# Patient Record
Sex: FEMALE | URBAN - METROPOLITAN AREA
[De-identification: names, ages, dates, MRNs, and addresses within clinical notes are randomized per-mention and may not be internally consistent; named-entity substitution may affect disease eponyms.]

---

## 2023-11-20 ENCOUNTER — INPATIENT (INPATIENT)
Facility: HOSPITAL | Age: 53
LOS: 9 days | Discharge: PSYCHIATRIC OR SUBSTANCE USE FACILITY | DRG: 885 | End: 2023-11-30
Attending: STUDENT IN AN ORGANIZED HEALTH CARE EDUCATION/TRAINING PROGRAM | Admitting: HOSPITALIST
Payer: SELF-PAY

## 2023-11-20 VITALS
SYSTOLIC BLOOD PRESSURE: 104 MMHG | OXYGEN SATURATION: 99 % | HEIGHT: 60 IN | HEART RATE: 102 BPM | RESPIRATION RATE: 20 BRPM | DIASTOLIC BLOOD PRESSURE: 70 MMHG | WEIGHT: 100.09 LBS

## 2023-11-20 LAB
ALBUMIN SERPL ELPH-MCNC: 4.4 G/DL — SIGNIFICANT CHANGE UP (ref 3.3–5)
ALBUMIN SERPL ELPH-MCNC: 4.4 G/DL — SIGNIFICANT CHANGE UP (ref 3.3–5)
ALP SERPL-CCNC: 189 U/L — HIGH (ref 40–120)
ALP SERPL-CCNC: 189 U/L — HIGH (ref 40–120)
ALT FLD-CCNC: 19 U/L — SIGNIFICANT CHANGE UP (ref 10–45)
ALT FLD-CCNC: 19 U/L — SIGNIFICANT CHANGE UP (ref 10–45)
ANION GAP SERPL CALC-SCNC: 13 MMOL/L — SIGNIFICANT CHANGE UP (ref 5–17)
ANION GAP SERPL CALC-SCNC: 13 MMOL/L — SIGNIFICANT CHANGE UP (ref 5–17)
APAP SERPL-MCNC: <15 UG/ML — SIGNIFICANT CHANGE UP (ref 10–30)
APAP SERPL-MCNC: <15 UG/ML — SIGNIFICANT CHANGE UP (ref 10–30)
AST SERPL-CCNC: 10 U/L — SIGNIFICANT CHANGE UP (ref 10–40)
AST SERPL-CCNC: 10 U/L — SIGNIFICANT CHANGE UP (ref 10–40)
B-OH-BUTYR SERPL-SCNC: 0.2 MMOL/L — SIGNIFICANT CHANGE UP
B-OH-BUTYR SERPL-SCNC: 0.2 MMOL/L — SIGNIFICANT CHANGE UP
BASE EXCESS BLDV CALC-SCNC: -0.2 MMOL/L — SIGNIFICANT CHANGE UP (ref -2–3)
BASE EXCESS BLDV CALC-SCNC: -0.2 MMOL/L — SIGNIFICANT CHANGE UP (ref -2–3)
BASOPHILS # BLD AUTO: 0.05 K/UL — SIGNIFICANT CHANGE UP (ref 0–0.2)
BASOPHILS # BLD AUTO: 0.05 K/UL — SIGNIFICANT CHANGE UP (ref 0–0.2)
BASOPHILS NFR BLD AUTO: 0.5 % — SIGNIFICANT CHANGE UP (ref 0–2)
BASOPHILS NFR BLD AUTO: 0.5 % — SIGNIFICANT CHANGE UP (ref 0–2)
BILIRUB SERPL-MCNC: 0.2 MG/DL — SIGNIFICANT CHANGE UP (ref 0.2–1.2)
BILIRUB SERPL-MCNC: 0.2 MG/DL — SIGNIFICANT CHANGE UP (ref 0.2–1.2)
BUN SERPL-MCNC: 18 MG/DL — SIGNIFICANT CHANGE UP (ref 7–23)
BUN SERPL-MCNC: 18 MG/DL — SIGNIFICANT CHANGE UP (ref 7–23)
CA-I SERPL-SCNC: 1.22 MMOL/L — SIGNIFICANT CHANGE UP (ref 1.15–1.33)
CA-I SERPL-SCNC: 1.22 MMOL/L — SIGNIFICANT CHANGE UP (ref 1.15–1.33)
CALCIUM SERPL-MCNC: 10 MG/DL — SIGNIFICANT CHANGE UP (ref 8.4–10.5)
CALCIUM SERPL-MCNC: 10 MG/DL — SIGNIFICANT CHANGE UP (ref 8.4–10.5)
CHLORIDE BLDV-SCNC: 98 MMOL/L — SIGNIFICANT CHANGE UP (ref 96–108)
CHLORIDE BLDV-SCNC: 98 MMOL/L — SIGNIFICANT CHANGE UP (ref 96–108)
CHLORIDE SERPL-SCNC: 98 MMOL/L — SIGNIFICANT CHANGE UP (ref 96–108)
CHLORIDE SERPL-SCNC: 98 MMOL/L — SIGNIFICANT CHANGE UP (ref 96–108)
CO2 BLDV-SCNC: 27 MMOL/L — HIGH (ref 22–26)
CO2 BLDV-SCNC: 27 MMOL/L — HIGH (ref 22–26)
CO2 SERPL-SCNC: 21 MMOL/L — LOW (ref 22–31)
CO2 SERPL-SCNC: 21 MMOL/L — LOW (ref 22–31)
CREAT SERPL-MCNC: 0.42 MG/DL — LOW (ref 0.5–1.3)
CREAT SERPL-MCNC: 0.42 MG/DL — LOW (ref 0.5–1.3)
EGFR: 118 ML/MIN/1.73M2 — SIGNIFICANT CHANGE UP
EGFR: 118 ML/MIN/1.73M2 — SIGNIFICANT CHANGE UP
EOSINOPHIL # BLD AUTO: 0.03 K/UL — SIGNIFICANT CHANGE UP (ref 0–0.5)
EOSINOPHIL # BLD AUTO: 0.03 K/UL — SIGNIFICANT CHANGE UP (ref 0–0.5)
EOSINOPHIL NFR BLD AUTO: 0.3 % — SIGNIFICANT CHANGE UP (ref 0–6)
EOSINOPHIL NFR BLD AUTO: 0.3 % — SIGNIFICANT CHANGE UP (ref 0–6)
ETHANOL SERPL-MCNC: <10 MG/DL — SIGNIFICANT CHANGE UP (ref 0–10)
ETHANOL SERPL-MCNC: <10 MG/DL — SIGNIFICANT CHANGE UP (ref 0–10)
GAS PNL BLDV: 128 MMOL/L — LOW (ref 136–145)
GAS PNL BLDV: 128 MMOL/L — LOW (ref 136–145)
GAS PNL BLDV: SIGNIFICANT CHANGE UP
GAS PNL BLDV: SIGNIFICANT CHANGE UP
GLUCOSE BLDV-MCNC: 442 MG/DL — HIGH (ref 70–99)
GLUCOSE BLDV-MCNC: 442 MG/DL — HIGH (ref 70–99)
GLUCOSE SERPL-MCNC: 530 MG/DL — CRITICAL HIGH (ref 70–99)
GLUCOSE SERPL-MCNC: 530 MG/DL — CRITICAL HIGH (ref 70–99)
HCG SERPL-ACNC: <2 MIU/ML — SIGNIFICANT CHANGE UP
HCG SERPL-ACNC: <2 MIU/ML — SIGNIFICANT CHANGE UP
HCO3 BLDV-SCNC: 25 MMOL/L — SIGNIFICANT CHANGE UP (ref 22–29)
HCO3 BLDV-SCNC: 25 MMOL/L — SIGNIFICANT CHANGE UP (ref 22–29)
HCT VFR BLD CALC: 40.7 % — SIGNIFICANT CHANGE UP (ref 34.5–45)
HCT VFR BLD CALC: 40.7 % — SIGNIFICANT CHANGE UP (ref 34.5–45)
HCT VFR BLDA CALC: 39 % — SIGNIFICANT CHANGE UP (ref 34.5–46.5)
HCT VFR BLDA CALC: 39 % — SIGNIFICANT CHANGE UP (ref 34.5–46.5)
HGB BLD CALC-MCNC: 13 G/DL — SIGNIFICANT CHANGE UP (ref 11.7–16.1)
HGB BLD CALC-MCNC: 13 G/DL — SIGNIFICANT CHANGE UP (ref 11.7–16.1)
HGB BLD-MCNC: 14.2 G/DL — SIGNIFICANT CHANGE UP (ref 11.5–15.5)
HGB BLD-MCNC: 14.2 G/DL — SIGNIFICANT CHANGE UP (ref 11.5–15.5)
IMM GRANULOCYTES NFR BLD AUTO: 0.5 % — SIGNIFICANT CHANGE UP (ref 0–0.9)
IMM GRANULOCYTES NFR BLD AUTO: 0.5 % — SIGNIFICANT CHANGE UP (ref 0–0.9)
LACTATE BLDV-MCNC: 1.3 MMOL/L — SIGNIFICANT CHANGE UP (ref 0.5–2)
LACTATE BLDV-MCNC: 1.3 MMOL/L — SIGNIFICANT CHANGE UP (ref 0.5–2)
LYMPHOCYTES # BLD AUTO: 2.29 K/UL — SIGNIFICANT CHANGE UP (ref 1–3.3)
LYMPHOCYTES # BLD AUTO: 2.29 K/UL — SIGNIFICANT CHANGE UP (ref 1–3.3)
LYMPHOCYTES # BLD AUTO: 21.3 % — SIGNIFICANT CHANGE UP (ref 13–44)
LYMPHOCYTES # BLD AUTO: 21.3 % — SIGNIFICANT CHANGE UP (ref 13–44)
MCHC RBC-ENTMCNC: 30.4 PG — SIGNIFICANT CHANGE UP (ref 27–34)
MCHC RBC-ENTMCNC: 30.4 PG — SIGNIFICANT CHANGE UP (ref 27–34)
MCHC RBC-ENTMCNC: 34.9 GM/DL — SIGNIFICANT CHANGE UP (ref 32–36)
MCHC RBC-ENTMCNC: 34.9 GM/DL — SIGNIFICANT CHANGE UP (ref 32–36)
MCV RBC AUTO: 87.2 FL — SIGNIFICANT CHANGE UP (ref 80–100)
MCV RBC AUTO: 87.2 FL — SIGNIFICANT CHANGE UP (ref 80–100)
MONOCYTES # BLD AUTO: 0.5 K/UL — SIGNIFICANT CHANGE UP (ref 0–0.9)
MONOCYTES # BLD AUTO: 0.5 K/UL — SIGNIFICANT CHANGE UP (ref 0–0.9)
MONOCYTES NFR BLD AUTO: 4.6 % — SIGNIFICANT CHANGE UP (ref 2–14)
MONOCYTES NFR BLD AUTO: 4.6 % — SIGNIFICANT CHANGE UP (ref 2–14)
NEUTROPHILS # BLD AUTO: 7.85 K/UL — HIGH (ref 1.8–7.4)
NEUTROPHILS # BLD AUTO: 7.85 K/UL — HIGH (ref 1.8–7.4)
NEUTROPHILS NFR BLD AUTO: 72.8 % — SIGNIFICANT CHANGE UP (ref 43–77)
NEUTROPHILS NFR BLD AUTO: 72.8 % — SIGNIFICANT CHANGE UP (ref 43–77)
NRBC # BLD: 0 /100 WBCS — SIGNIFICANT CHANGE UP (ref 0–0)
NRBC # BLD: 0 /100 WBCS — SIGNIFICANT CHANGE UP (ref 0–0)
PCO2 BLDV: 44 MMHG — HIGH (ref 39–42)
PCO2 BLDV: 44 MMHG — HIGH (ref 39–42)
PH BLDV: 7.37 — SIGNIFICANT CHANGE UP (ref 7.32–7.43)
PH BLDV: 7.37 — SIGNIFICANT CHANGE UP (ref 7.32–7.43)
PLATELET # BLD AUTO: 329 K/UL — SIGNIFICANT CHANGE UP (ref 150–400)
PLATELET # BLD AUTO: 329 K/UL — SIGNIFICANT CHANGE UP (ref 150–400)
PO2 BLDV: 64 MMHG — HIGH (ref 25–45)
PO2 BLDV: 64 MMHG — HIGH (ref 25–45)
POTASSIUM BLDV-SCNC: 4.2 MMOL/L — SIGNIFICANT CHANGE UP (ref 3.5–5.1)
POTASSIUM BLDV-SCNC: 4.2 MMOL/L — SIGNIFICANT CHANGE UP (ref 3.5–5.1)
POTASSIUM SERPL-MCNC: 4.6 MMOL/L — SIGNIFICANT CHANGE UP (ref 3.5–5.3)
POTASSIUM SERPL-MCNC: 4.6 MMOL/L — SIGNIFICANT CHANGE UP (ref 3.5–5.3)
POTASSIUM SERPL-SCNC: 4.6 MMOL/L — SIGNIFICANT CHANGE UP (ref 3.5–5.3)
POTASSIUM SERPL-SCNC: 4.6 MMOL/L — SIGNIFICANT CHANGE UP (ref 3.5–5.3)
PROT SERPL-MCNC: 8 G/DL — SIGNIFICANT CHANGE UP (ref 6–8.3)
PROT SERPL-MCNC: 8 G/DL — SIGNIFICANT CHANGE UP (ref 6–8.3)
RBC # BLD: 4.67 M/UL — SIGNIFICANT CHANGE UP (ref 3.8–5.2)
RBC # BLD: 4.67 M/UL — SIGNIFICANT CHANGE UP (ref 3.8–5.2)
RBC # FLD: 12.1 % — SIGNIFICANT CHANGE UP (ref 10.3–14.5)
RBC # FLD: 12.1 % — SIGNIFICANT CHANGE UP (ref 10.3–14.5)
SALICYLATES SERPL-MCNC: <2 MG/DL — LOW (ref 15–30)
SALICYLATES SERPL-MCNC: <2 MG/DL — LOW (ref 15–30)
SAO2 % BLDV: 88.7 % — HIGH (ref 67–88)
SAO2 % BLDV: 88.7 % — HIGH (ref 67–88)
SARS-COV-2 RNA SPEC QL NAA+PROBE: SIGNIFICANT CHANGE UP
SARS-COV-2 RNA SPEC QL NAA+PROBE: SIGNIFICANT CHANGE UP
SODIUM SERPL-SCNC: 132 MMOL/L — LOW (ref 135–145)
SODIUM SERPL-SCNC: 132 MMOL/L — LOW (ref 135–145)
WBC # BLD: 10.77 K/UL — HIGH (ref 3.8–10.5)
WBC # BLD: 10.77 K/UL — HIGH (ref 3.8–10.5)
WBC # FLD AUTO: 10.77 K/UL — HIGH (ref 3.8–10.5)
WBC # FLD AUTO: 10.77 K/UL — HIGH (ref 3.8–10.5)

## 2023-11-20 PROCEDURE — 99285 EMERGENCY DEPT VISIT HI MDM: CPT

## 2023-11-20 RX ORDER — HALOPERIDOL DECANOATE 100 MG/ML
5 INJECTION INTRAMUSCULAR ONCE
Refills: 0 | Status: COMPLETED | OUTPATIENT
Start: 2023-11-20 | End: 2023-11-20

## 2023-11-20 RX ORDER — SODIUM CHLORIDE 9 MG/ML
1000 INJECTION, SOLUTION INTRAVENOUS ONCE
Refills: 0 | Status: COMPLETED | OUTPATIENT
Start: 2023-11-20 | End: 2023-11-20

## 2023-11-20 RX ADMIN — HALOPERIDOL DECANOATE 5 MILLIGRAM(S): 100 INJECTION INTRAMUSCULAR at 20:34

## 2023-11-20 RX ADMIN — Medication 2 MILLIGRAM(S): at 20:34

## 2023-11-20 NOTE — ED ADULT TRIAGE NOTE - CHIEF COMPLAINT QUOTE
found wandering outside  per EMS family called due to patient "not taking her medications and wandering outside"  Hx of schizophrenia

## 2023-11-20 NOTE — ED PROVIDER NOTE - ATTENDING CONTRIBUTION TO CARE
Attending MD Garcia:  I personally have seen and examined this patient. I have performed a substantive portion of the visit including all aspects of the medical decision making.  Resident note reviewed and agree on plan of care and except where noted.      53-year-old woman with reported history of schizophrenia, diabetes is presenting with EMS for evaluation of possible psychiatric complaints.  Per the patient, she states her cousin called the ambulance on her today because she was causing a "domestic issue".  She states that she thinks her family called the ambulance because she talks too much.  Reportedly she has not been taking her medications however the patient does not know what medicine she is supposed to be on.  When asked if she wants to kill herself she says yes.  She does not elaborate on why or how she would do it.  She does admit to hearing voices she states that she hears people making noises and crying and making a mess.  She lives in a shelter.  Denies any recreational drug use.  No other complaints.    Patient's vital signs are notable for elevated heart rate otherwise nonactionable.  Patient sitting in the stretcher cooperative, at the moment.  No evident trauma somewhat disheveled.  Clear lungs 5/5 strength bilateral upper and lower extremities steady gait.    Patient with reported schizophrenia presenting for reported issues with medication compliance however unclear what medications patient supposed to be taking.  Patient's, cooperative this time but does admit to SI and auditory hallucinations.  Plan for constant observation screening psychiatric labs and psych consult        *The above represents an initial assessment/impression. Please refer to progress notes for potential changes in patient clinical course* Attending MD Garcia:  I personally have seen and examined this patient. I have performed a substantive portion of the visit including all aspects of the medical decision making.  Resident note reviewed and agree on plan of care and except where noted.      53-year-old woman with reported history of schizophrenia, diabetes is presenting with EMS for evaluation of possible psychiatric complaints, Estonian translation by ID# [id number].  Per the patient, she states her cousin called the ambulance on her today because she was causing a "domestic issue".  She states that she thinks her family called the ambulance because she talks too much.  Reportedly she has not been taking her medications however the patient does not know what medicine she is supposed to be on.  When asked if she wants to kill herself she says yes.  She does not elaborate on why or how she would do it.  She does admit to hearing voices she states that she hears people making noises and crying and making a mess.  She lives in a shelter.  Denies any recreational drug use.  No other complaints.    Patient's vital signs are notable for elevated heart rate otherwise nonactionable.  Patient sitting in the stretcher cooperative, at the moment.  No evident trauma somewhat disheveled.  Clear lungs 5/5 strength bilateral upper and lower extremities steady gait.    Patient with reported schizophrenia presenting for reported issues with medication compliance however unclear what medications patient supposed to be taking.  Patient's, cooperative this time but does admit to SI and auditory hallucinations.  Plan for constant observation screening psychiatric labs and psych consult        *The above represents an initial assessment/impression. Please refer to progress notes for potential changes in patient clinical course*

## 2023-11-20 NOTE — ED ADULT NURSE NOTE - OBJECTIVE STATEMENT
53 year old female BIB EMS for psych eval; A&O; +SI, unclear plan or intent; +AVH, pt cannot state; denies ETOH and drug use; axis III: DM, states takes oral hypoglycemics. This is a somewhat disorganised pt with poor hygiene and soiled clothes, wanding done, no valuables, CO begun. EMS states that pt's sister called 911 stating pt is off her meds, has schizophrenia, no other collateral given; pt is Chinese speaking and  utilized, she states she lives in a homeless shelter "by [address]" and today "I went out with some friends for Mexican food and then went by my sister's and she called 911"; pt is not a reliable historian, when asked about hearing or seeing thing she knows are not there states "I hear people in my homeless shelter and they make noise so I can't sleep at night" when asked about SI states "I am going to be dead at my homeless shelter' she states she is compliant with her medications but cannot state when she last took them; continue to monitor.

## 2023-11-20 NOTE — ED PROVIDER NOTE - PROGRESS NOTE DETAILS
Attending MD Garcia: Patient hyperglycemic to 530, unfortunately patient's not cooperative with interventions including IV placement.  Will need to provide relaxant medications Haldol and Ativan to facilitate medical investigations/treatment. Hamida RICHARDS PGY3: Patient not in DKA.  Given 2 L IVF.  Will repeat fingerstick.  Spoke to hospitalist will admit patient for hyperglycemia. will need psych in pt

## 2023-11-20 NOTE — ED ADULT NURSE REASSESSMENT NOTE - NS ED NURSE REASSESS COMMENT FT1
Pt laying on the floor refusing to get up or lay in stretcher. Safety maintained, safe environment with 1:1 observation. MD Mei aware.

## 2023-11-21 DIAGNOSIS — R73.9 HYPERGLYCEMIA, UNSPECIFIED: ICD-10-CM

## 2023-11-21 DIAGNOSIS — E78.5 HYPERLIPIDEMIA, UNSPECIFIED: ICD-10-CM

## 2023-11-21 DIAGNOSIS — F20.9 SCHIZOPHRENIA, UNSPECIFIED: ICD-10-CM

## 2023-11-21 DIAGNOSIS — I10 ESSENTIAL (PRIMARY) HYPERTENSION: ICD-10-CM

## 2023-11-21 DIAGNOSIS — R45.851 SUICIDAL IDEATIONS: ICD-10-CM

## 2023-11-21 DIAGNOSIS — R41.82 ALTERED MENTAL STATUS, UNSPECIFIED: ICD-10-CM

## 2023-11-21 DIAGNOSIS — E11.65 TYPE 2 DIABETES MELLITUS WITH HYPERGLYCEMIA: ICD-10-CM

## 2023-11-21 LAB
A1C WITH ESTIMATED AVERAGE GLUCOSE RESULT: 13.4 % — HIGH (ref 4–5.6)
A1C WITH ESTIMATED AVERAGE GLUCOSE RESULT: 13.4 % — HIGH (ref 4–5.6)
ESTIMATED AVERAGE GLUCOSE: 338 MG/DL — HIGH (ref 68–114)
ESTIMATED AVERAGE GLUCOSE: 338 MG/DL — HIGH (ref 68–114)
GLUCOSE BLDC GLUCOMTR-MCNC: 208 MG/DL — HIGH (ref 70–99)
GLUCOSE BLDC GLUCOMTR-MCNC: 208 MG/DL — HIGH (ref 70–99)
GLUCOSE BLDC GLUCOMTR-MCNC: 258 MG/DL — HIGH (ref 70–99)
GLUCOSE BLDC GLUCOMTR-MCNC: 258 MG/DL — HIGH (ref 70–99)
GLUCOSE BLDC GLUCOMTR-MCNC: 309 MG/DL — HIGH (ref 70–99)
GLUCOSE BLDC GLUCOMTR-MCNC: 309 MG/DL — HIGH (ref 70–99)
GLUCOSE BLDC GLUCOMTR-MCNC: 315 MG/DL — HIGH (ref 70–99)
GLUCOSE BLDC GLUCOMTR-MCNC: 315 MG/DL — HIGH (ref 70–99)
GLUCOSE BLDC GLUCOMTR-MCNC: 331 MG/DL — HIGH (ref 70–99)
GLUCOSE BLDC GLUCOMTR-MCNC: 331 MG/DL — HIGH (ref 70–99)

## 2023-11-21 PROCEDURE — 99255 IP/OBS CONSLTJ NEW/EST HI 80: CPT

## 2023-11-21 PROCEDURE — 99254 IP/OBS CNSLTJ NEW/EST MOD 60: CPT

## 2023-11-21 PROCEDURE — 99222 1ST HOSP IP/OBS MODERATE 55: CPT

## 2023-11-21 RX ORDER — SODIUM CHLORIDE 9 MG/ML
1000 INJECTION, SOLUTION INTRAVENOUS
Refills: 0 | Status: DISCONTINUED | OUTPATIENT
Start: 2023-11-21 | End: 2023-11-30

## 2023-11-21 RX ORDER — ACETAMINOPHEN 500 MG
650 TABLET ORAL EVERY 6 HOURS
Refills: 0 | Status: DISCONTINUED | OUTPATIENT
Start: 2023-11-21 | End: 2023-11-30

## 2023-11-21 RX ORDER — INSULIN LISPRO 100/ML
VIAL (ML) SUBCUTANEOUS
Refills: 0 | Status: DISCONTINUED | OUTPATIENT
Start: 2023-11-21 | End: 2023-11-30

## 2023-11-21 RX ORDER — INSULIN LISPRO 100/ML
3 VIAL (ML) SUBCUTANEOUS
Refills: 0 | Status: DISCONTINUED | OUTPATIENT
Start: 2023-11-21 | End: 2023-11-22

## 2023-11-21 RX ORDER — SODIUM CHLORIDE 9 MG/ML
1000 INJECTION INTRAMUSCULAR; INTRAVENOUS; SUBCUTANEOUS ONCE
Refills: 0 | Status: COMPLETED | OUTPATIENT
Start: 2023-11-21 | End: 2023-11-21

## 2023-11-21 RX ORDER — DEXTROSE 50 % IN WATER 50 %
25 SYRINGE (ML) INTRAVENOUS ONCE
Refills: 0 | Status: DISCONTINUED | OUTPATIENT
Start: 2023-11-21 | End: 2023-11-30

## 2023-11-21 RX ORDER — ONDANSETRON 8 MG/1
4 TABLET, FILM COATED ORAL EVERY 8 HOURS
Refills: 0 | Status: DISCONTINUED | OUTPATIENT
Start: 2023-11-21 | End: 2023-11-30

## 2023-11-21 RX ORDER — INSULIN HUMAN 100 [IU]/ML
5 INJECTION, SOLUTION SUBCUTANEOUS ONCE
Refills: 0 | Status: COMPLETED | OUTPATIENT
Start: 2023-11-21 | End: 2023-11-21

## 2023-11-21 RX ORDER — DEXTROSE 50 % IN WATER 50 %
15 SYRINGE (ML) INTRAVENOUS ONCE
Refills: 0 | Status: DISCONTINUED | OUTPATIENT
Start: 2023-11-21 | End: 2023-11-30

## 2023-11-21 RX ORDER — INSULIN GLARGINE 100 [IU]/ML
12 INJECTION, SOLUTION SUBCUTANEOUS AT BEDTIME
Refills: 0 | Status: DISCONTINUED | OUTPATIENT
Start: 2023-11-21 | End: 2023-11-22

## 2023-11-21 RX ORDER — LANOLIN ALCOHOL/MO/W.PET/CERES
3 CREAM (GRAM) TOPICAL AT BEDTIME
Refills: 0 | Status: DISCONTINUED | OUTPATIENT
Start: 2023-11-21 | End: 2023-11-30

## 2023-11-21 RX ORDER — DEXTROSE 50 % IN WATER 50 %
12.5 SYRINGE (ML) INTRAVENOUS ONCE
Refills: 0 | Status: DISCONTINUED | OUTPATIENT
Start: 2023-11-21 | End: 2023-11-30

## 2023-11-21 RX ORDER — GLUCAGON INJECTION, SOLUTION 0.5 MG/.1ML
1 INJECTION, SOLUTION SUBCUTANEOUS ONCE
Refills: 0 | Status: DISCONTINUED | OUTPATIENT
Start: 2023-11-21 | End: 2023-11-30

## 2023-11-21 RX ADMIN — SODIUM CHLORIDE 1000 MILLILITER(S): 9 INJECTION INTRAMUSCULAR; INTRAVENOUS; SUBCUTANEOUS at 01:31

## 2023-11-21 RX ADMIN — Medication 3 UNIT(S): at 18:03

## 2023-11-21 RX ADMIN — SODIUM CHLORIDE 1000 MILLILITER(S): 9 INJECTION, SOLUTION INTRAVENOUS at 00:03

## 2023-11-21 RX ADMIN — INSULIN HUMAN 5 UNIT(S): 100 INJECTION, SOLUTION SUBCUTANEOUS at 04:57

## 2023-11-21 RX ADMIN — Medication 2: at 08:29

## 2023-11-21 RX ADMIN — Medication 4: at 18:03

## 2023-11-21 RX ADMIN — INSULIN GLARGINE 12 UNIT(S): 100 INJECTION, SOLUTION SUBCUTANEOUS at 22:48

## 2023-11-21 NOTE — BH CONSULTATION LIAISON ASSESSMENT NOTE - NSBHCHARTREVIEWVS_PSY_A_CORE FT
Vital Signs Last 24 Hrs  T(C): 36.3 (21 Nov 2023 05:42), Max: 36.4 (21 Nov 2023 02:28)  T(F): 97.4 (21 Nov 2023 05:42), Max: 97.5 (21 Nov 2023 02:28)  HR: 75 (21 Nov 2023 05:42) (55 - 103)  BP: 103/65 (21 Nov 2023 05:42) (101/69 - 118/64)  BP(mean): --  RR: 18 (21 Nov 2023 05:42) (18 - 20)  SpO2: 97% (21 Nov 2023 05:42) (97% - 99%)    Parameters below as of 21 Nov 2023 02:28  Patient On (Oxygen Delivery Method): room air

## 2023-11-21 NOTE — BH CONSULTATION LIAISON ASSESSMENT NOTE - HPI (INCLUDE ILLNESS QUALITY, SEVERITY, DURATION, TIMING, CONTEXT, MODIFYING FACTORS, ASSOCIATED SIGNS AND SYMPTOMS)
53F Slovenian speaking patient with reported history of schizophrenia, diabetes is presenting due to possible psychiatric complaints/AMS.      Per the ED notes:   No phone contact number available. Previous doctor not in hospital currently. Hx obtained via chart review only. Need medication reconciliation.    Previously, per the patient, she stated her cousin called the ambulance on her today because she was causing a "domestic issue".  She stated that she "thinks her family called the ambulance because she talks too much". Reportedly she has not been taking her medications, however the patient did not know what medicine she is supposed to be on.  When asked if she wants to kill herself she says yes.  She does not elaborate on why or how she would do it.  She does admit to hearing voices she states that she hears people making noises and crying and making a mess.  She lives in a shelter.  Denies any recreational drug use.  No other complaints.    Today, seen at bedside by psychiatry. The patient was refusing to speak with the team, turning over in bed and at times pretending to be asleep.  The possibility of psychiatric admission was communicated to the patient.

## 2023-11-21 NOTE — BH CONSULTATION LIAISON ASSESSMENT NOTE - RISK ASSESSMENT
Risk factors: Patient has stated SI, has unstable housing (in homeless shelter) and appears in soiled clothes and with poor hygiene, suggesting inability to care for self    No known access to lethal means, however really unable to assess.

## 2023-11-21 NOTE — BH CONSULTATION LIAISON ASSESSMENT NOTE - NSBHATTESTCOMMENTATTENDFT_PSY_A_CORE
This is a 53-y.o. HF patient, Georgian-speaking, with reported history of schizophrenia, diabetes is presenting due to possible psychiatric complaints/AMS. Patient was found wandering and is not cooperative. Admitted for hyperglycemia. Consult requested to evaluate patient for bizarre behavior.    I have seen and evaluated this patient myself. Chart, labs, meds reviewed. I agree with trainee's assessment and plan. Patient prevalently negativistic, refusing to cooperate or engage with care providers. Not a safe discharge at this time.

## 2023-11-21 NOTE — BH CONSULTATION LIAISON ASSESSMENT NOTE - ADDITIONAL DETAILS ALL
Per nurse note -- when asked about SI states "I am going to be dead at my homeless shelter' she states she is compliant with her medications but cannot state when she last took them

## 2023-11-21 NOTE — BH CONSULTATION LIAISON ASSESSMENT NOTE - CURRENT MEDICATION
MEDICATIONS  (STANDING):  dextrose 5%. 1000 milliLiter(s) (100 mL/Hr) IV Continuous <Continuous>  dextrose 5%. 1000 milliLiter(s) (50 mL/Hr) IV Continuous <Continuous>  dextrose 50% Injectable 25 Gram(s) IV Push once  dextrose 50% Injectable 12.5 Gram(s) IV Push once  dextrose 50% Injectable 25 Gram(s) IV Push once  glucagon  Injectable 1 milliGRAM(s) IntraMuscular once  insulin lispro (ADMELOG) corrective regimen sliding scale   SubCutaneous three times a day before meals    MEDICATIONS  (PRN):  acetaminophen     Tablet .. 650 milliGRAM(s) Oral every 6 hours PRN Temp greater or equal to 38C (100.4F), Mild Pain (1 - 3)  aluminum hydroxide/magnesium hydroxide/simethicone Suspension 30 milliLiter(s) Oral every 4 hours PRN Dyspepsia  dextrose Oral Gel 15 Gram(s) Oral once PRN Blood Glucose LESS THAN 70 milliGRAM(s)/deciliter  melatonin 3 milliGRAM(s) Oral at bedtime PRN Insomnia  ondansetron Injectable 4 milliGRAM(s) IV Push every 8 hours PRN Nausea and/or Vomiting

## 2023-11-21 NOTE — BH CONSULTATION LIAISON ASSESSMENT NOTE - DESCRIPTION
Patient lives in a shelter near "[address]," denies ETOH and drug use per note.

## 2023-11-21 NOTE — CONSULT NOTE ADULT - ATTENDING COMMENTS
Agree with Dr. Juan's assessment and plan as outlined above. Reviewed all pertinent labs, and imaging studies. Modifications made as indicated above. 53 F with history of schizoprehnia and T2D here for AMS. Endocrinology consulted for diabetes management. A1C 13.4. Patient is a poor historian, unclear what she takes at home. Would start weight based basal/bolus while admitted and titrate to glucose goal of 100-180 while inpatient. Discharge regimen will be based on her dispo plan and whether she can safely give herself insulin injection. Rest of the plan as above. Patient is high risk with high level decision making due to uncontrolled diabetes which places patient at high risk for cardiovascular and cerebrovascular events. Patient with lability of glucose requiring close monitoring and insulin adjustments.

## 2023-11-21 NOTE — H&P ADULT - PROBLEM SELECTOR PLAN 1
- Glucose of 530 on admission, improved to 315 with fluids given in ED  - Likely due to medicaiton non-compliance, unsure of home meds  - Regular insulin 5u given  - Low ISS  - F/u POCT, A1c

## 2023-11-21 NOTE — H&P ADULT - NSHPPHYSICALEXAM_GEN_ALL_CORE
T(C): 36.4 (11-21-23 @ 02:28), Max: 36.4 (11-21-23 @ 02:28)  HR: 75 (11-21-23 @ 02:28) (55 - 103)  BP: 116/75 (11-21-23 @ 02:28) (101/69 - 118/64)  RR: 18 (11-21-23 @ 02:28) (18 - 20)  SpO2: 99% (11-21-23 @ 02:28) (98% - 99%)    CONSTITUTIONAL: No apparent distress. Lethargic. Malodorous.  EYES: PERRLA and symmetric  ENMT: Oral mucosa with moist membranes. Poor dentition  NECK: Supple, symmetric. No JVD.  RESP: No respiratory distress, no use of accessory muscles; CTA b/l, no WRR  CV: RRR, +S1S2, no MRG  GI: Soft, NT, ND, no rebound, no guarding  : No suprapubic tenderness. No CVA tenderness.  LYMPH: No cervical LAD or tenderness  MSK: No spinal tenderness  EXTREMITIES: No pedal edema  SKIN: No rashes or ulcers noted  NEURO: Responsive only to painful stimuli. No tremor.

## 2023-11-21 NOTE — PATIENT PROFILE ADULT - FUNCTIONAL ASSESSMENT - DAILY ACTIVITY 4.
-- DO NOT REPLY / DO NOT REPLY ALL --  -- Message is from the [facility]--    COVID-19 Universal Screening: N/A - Not about scheduling    General Patient Message      Reason for Call: Please be advised, My medication is being sent to the [facility] on [address]. My medication should be sent to the [facility] on [address] which is closer to where I live. The Pharmacy is aware & they will reverse all medications back to the original location.     Caller Information       Type Contact Phone    07/19/2020 04:57 PM Phone (Incoming) Jannie Govea (Self) [phone] (H)          Alternative phone number: None     Turnaround time given to caller:   \"This message will be sent to [state Provider's name]. The clinical team will fulfill your request as soon as they review your message when the office opens on Monday (or after the holiday).\"     3 = A little assistance

## 2023-11-21 NOTE — BH CONSULTATION LIAISON ASSESSMENT NOTE - SUMMARY
53F Hebrew speaking patient with reported history of schizophrenia, diabetes is presenting due to possible psychiatric complaints/AMS.      Per the ED notes:   No phone contact number available. Previous doctor not in hospital currently. Hx obtained via chart review only. Need medication reconciliation.    Previously, per the patient, she stated her cousin called the ambulance on her today because she was causing a "domestic issue".  She stated that she "thinks her family called the ambulance because she talks too much". Reportedly she has not been taking her medications, however the patient did not know what medicine she is supposed to be on.  When asked if she wants to kill herself she says yes.  She does not elaborate on why or how she would do it.  She does admit to hearing voices she states that she hears people making noises and crying and making a mess.  She lives in a shelter.  Denies any recreational drug use.  No other complaints.    Today, seen at bedside by psychiatry. The patient was refusing to speak with the team, turning over in bed and at times pretending to be asleep.  The possibility of psychiatric admission was communicated to the patient.    Plan:  - One to one observation for possible SI  - B12/Folate, TSH w/ FT4, Head CT  -EKG for Qtc monitoring should neuroleptic agents need to be used   -PRN: Haldol 0.5 mg PO/IM/IV q 6Hrs PRN severe agitation if Qtc < 500   -Pt. may warrant admission to psychiatry once medically cleared, will continue to evaluate and reassess  53F Turkmen speaking patient with reported history of schizophrenia, diabetes is presenting due to possible psychiatric complaints/AMS.      Per the ED notes:   No phone contact number available. Previous doctor not in hospital currently. Hx obtained via chart review only. Need medication reconciliation.    Previously, per the patient, she stated her cousin called the ambulance on her today because she was causing a "domestic issue".  She stated that she "thinks her family called the ambulance because she talks too much". Reportedly she has not been taking her medications, however the patient did not know what medicine she is supposed to be on.  When asked if she wants to kill herself she says yes.  She does not elaborate on why or how she would do it.  She does admit to hearing voices she states that she hears people making noises and crying and making a mess.  She lives in a shelter.  Denies any recreational drug use.  No other complaints.    Today, seen at bedside by psychiatry. The patient was refusing to speak with the team, turning over in bed and at times pretending to be asleep. She is stating that she does not want to talk.    Plan:  - One to one observation for possible SI  - B12/Folate, TSH w/ FT4, Head CT  -EKG for Qtc monitoring should neuroleptic agents need to be used   -PRN: Haldol 5 mg PO/IM/IV q 6Hrs PRN severe agitation if Qtc < 500   -Pt. may warrant admission to psychiatry once medically cleared, will continue to evaluate and reassess

## 2023-11-21 NOTE — H&P ADULT - HISTORY OF PRESENT ILLNESS
Likely 2/2 to receiving ativan and haldol, patient is completely unresponsive to stimuli other than pain. No phone contact number available. Previous doctor not in hospital currently. Hx obtained via chart review only. Need medication reconciliation.    53F Frisian speaking patient with reported history of schizophrenia, diabetes is presenting due to possible psychiatric complaints/AMS.      Previously, per the patient, she stated her cousin called the ambulance on her today because she was causing a "domestic issue".  She stated that she "thinks her family called the ambulance because she talks too much". Reportedly she has not been taking her medications, however the patient did not know what medicine she is supposed to be on.  When asked if she wants to kill herself she says yes.  She does not elaborate on why or how she would do it.  She does admit to hearing voices she states that she hears people making noises and crying and making a mess.  She lives in a shelter.  Denies any recreational drug use.  No other complaints.

## 2023-11-21 NOTE — H&P ADULT - PROBLEM SELECTOR PLAN 3
- AMS likely 2/2 to medication non-compliance, then administration of Ativan and Haldol  - Re-assess mental status in AM  - Clear diet until can evaluate patient

## 2023-11-21 NOTE — CONSULT NOTE ADULT - ASSESSMENT
53F Nauruan speaking patient with reported history of schizophrenia, diabetes is presenting due to possible psychiatric complaints/AMS.  Endocrinology consulted for management of diabetes.    Poorly controlled T2DM with hyperglycemia  - HbA1c: 13.4  - Home Regimen:   - Endocrinologist:  PLAN  - Hold oral DM agents while inpatient  - Start Lantus  units at bedtime. DO NOT HOLD IF NPO.  - Start Admelog  units TID pre-meal. HOLD IF NPO.  - Use moderate/Use low dose Admelog correction scale pre-meal  - Use moderate/Use low dose Admelog correction scale at bedtime  - Fingerstick BG before meals and bedtime  - Goal -180  - Carbohydrate consistent diet  - RD consult  Discharge plan:  - Discharge medications: ************************  - Patient to call doctor with persistent high or low BG at home.   - Ensure patient has glucometer, test strips and lancets on discharge.  - Recommend routine outpatient ophthalmology, podiatry and endocrinology f/u    HTN  - Home regimen: not on antihypertensive per chart review  PLAN  - Can check urine microalbumin outpatient  - Outpatient goal BP <130/80. Management per primary team.    HLD  - Home regimen: not on statin per chart review  PLAN  - Would likely benefit from a statin if no contraindication  - Can check lipid profile if not done recently    Discussed with primary team.    Michael Juan MD, Endocrinology Fellow  Pager [phone] from 9am to 5pm. After hours and on weekends, please call [phone].   53F Tunisian speaking patient with reported history of schizophrenia, diabetes is presenting due to possible psychiatric complaints/AMS.  Endocrinology consulted for management of diabetes.    Poorly controlled T2DM with hyperglycemia  - HbA1c: 13.4  - Home Regimen: not on medication  - Endocrinologist: does not have  PLAN  - Start Lantus 12 units at bedtime. DO NOT HOLD IF NPO.  - Start Admelog 3 units TID pre-meal. HOLD IF NPO.  - Use low dose Admelog correction scale pre-meal  - Use low dose Admelog correction scale at bedtime  - Fingerstick BG before meals and bedtime  - Goal -180  - Carbohydrate consistent diet  - RD consult  - hypoglycemia protocol prn  Discharge plan:  - Discharge medications: pending discharge plan  - Patient to call doctor with persistent high or low BG at home.   - Ensure patient has glucometer, test strips and lancets on discharge.  - Recommend routine outpatient ophthalmology, podiatry and endocrinology f/u    HTN  - Home regimen: not on antihypertensive per chart review  PLAN  - Can check urine microalbumin outpatient  - Outpatient goal BP <130/80. Management per primary team.    HLD  - Home regimen: not on statin per chart review  PLAN  - Would likely benefit from a statin if no contraindication  - Can check lipid profile if not done recently    Discussed with primary team.    Michael Juan MD, Endocrinology Fellow  Pager [phone] from 9am to 5pm. After hours and on weekends, please call [phone].

## 2023-11-21 NOTE — H&P ADULT - ASSESSMENT
Likely 2/2 to receiving ativan and haldol, patient is completely unresponsive to stimuli other than pain. No phone contact number available. Previous doctor not in hospital currently. Hx obtained via chart review only. Need medication reconciliation.    53F Frisian speaking patient with reported history of schizophrenia, diabetes is presenting due to possible psychiatric complaints/AMS.

## 2023-11-21 NOTE — BH CONSULTATION LIAISON ASSESSMENT NOTE - NSBHCHARTREVIEWLAB_PSY_A_CORE FT
POCT Blood Glucose (11.21.23 @ 08:18)   POCT Blood Glucose.: 208 mg/dL    Comprehensive Metabolic Panel (11.20.23 @ 17:48)   Sodium: 132 mmol/L  Potassium: 4.6 mmol/L  Chloride: 98 mmol/L  Carbon Dioxide: 21 mmol/L  Anion Gap: 13 mmol/L  Blood Urea Nitrogen: 18 mg/dL  Creatinine: 0.42 mg/dL  Glucose: 530 mg/dL  Calcium: 10.0 mg/dL  Protein Total: 8.0 g/dL  Albumin: 4.4 g/dL  Bilirubin Total: 0.2 mg/dL  Alkaline Phosphatase: 189 U/L  Aspartate Aminotransferase (AST/SGOT): 10 U/L  Alanine Aminotransferase (ALT/SGPT): 19 U/L  eGFR: 118    Complete Blood Count + Automated Diff (11.20.23 @ 17:48)   WBC Count: 10.77 K/uL  RBC Count: 4.67 M/uL  Hemoglobin: 14.2 g/dL  Hematocrit: 40.7 %  Mean Cell Volume: 87.2 fl  Mean Cell Hemoglobin: 30.4 pg  Mean Cell Hemoglobin Conc: 34.9 gm/dL  Red Cell Distrib Width: 12.1 %  Platelet Count - Automated: 329 K/uL

## 2023-11-21 NOTE — CHART NOTE - NSCHARTNOTEFT_GEN_A_CORE
Seen and examined at bedside. Attempted to use  Lisbeth ID [id number]. Patient initially had eyes open but then turned away, closed her eyes and did not want to speak to me. VSS remain stable. refusing labs and fingersticks.     53F Turkish speaking patient with reported history of schizophrenia, diabetes is presenting due to possible psychiatric complaints/AMS.    # Acute hyperglycemia.   - Glucose of 530 on admission, improved to 315 with fluids given in ED  - Likely due to medicaiton non-compliance, unsure of home meds  - Regular insulin 5u given last night, holding further insulin for now   - Low ISS  - F/u POCT but patient is refusing fingersticks     # Suicidal ideation, Schizophrenia    - Per chart review, patient expressed suicidal ideation  - 1:1 observation  - Psych consulted, f/u recs   - per chart review - nonadherence to medication     # AMS (altered mental status).   - AMS likely 2/2 to medication non-compliance, then administration of Ativan and Haldol  - unclear mental status, does not cooperate with exam, refuses to speak  - Clear diet until can evaluate patient.  - f/u CT head     d/w acp    Vicky Ferrer MD  [facility] Division of Hospital Medicine  Available via MS Teams

## 2023-11-21 NOTE — CONSULT NOTE ADULT - SUBJECTIVE AND OBJECTIVE BOX
ENDOCRINE INITIAL CONSULT - diabetes    HPI:  Likely 2/2 to receiving ativan and haldol, patient is completely unresponsive to stimuli other than pain. No phone contact number available. Previous doctor not in hospital currently. Hx obtained via chart review only. Need medication reconciliation.    53F French speaking patient with reported history of schizophrenia, diabetes is presenting due to possible psychiatric complaints/AMS.      Previously, per the patient, she stated her cousin called the ambulance on her today because she was causing a "domestic issue".  She stated that she "thinks her family called the ambulance because she talks too much". Reportedly she has not been taking her medications, however the patient did not know what medicine she is supposed to be on.  When asked if she wants to kill herself she says yes.  She does not elaborate on why or how she would do it.  She does admit to hearing voices she states that she hears people making noises and crying and making a mess.  She lives in a shelter.  Denies any recreational drug use.  No other complaints.   (21 Nov 2023 04:56)      ENDOCRINE HISTORY   Diagnosed with DM:   Last HbA1c: 13.4  Endocrinologist:   Home DM Meds:  Adherence:  Microvascular complications: Renal, opthalmologic, neuropathy  Macrovascular complications:  SMBG:  Symptoms:  Hypoglycemia episodes:  BG at home:  Diet at home:  Appetite in hospital:  Exercise:  PMHx:  PSHx:  Family hx:  Social hx:  Insurance:  Resides in:      PAST MEDICAL & SURGICAL HISTORY:  Schizophrenia          FAMILY HISTORY:      Social History:  Lives in a shelter (21 Nov 2023 04:56)      Home Medications:      MEDICATIONS  (STANDING):  dextrose 5%. 1000 milliLiter(s) (100 mL/Hr) IV Continuous <Continuous>  dextrose 5%. 1000 milliLiter(s) (50 mL/Hr) IV Continuous <Continuous>  dextrose 50% Injectable 25 Gram(s) IV Push once  dextrose 50% Injectable 25 Gram(s) IV Push once  dextrose 50% Injectable 12.5 Gram(s) IV Push once  glucagon  Injectable 1 milliGRAM(s) IntraMuscular once  insulin lispro (ADMELOG) corrective regimen sliding scale   SubCutaneous three times a day before meals    MEDICATIONS  (PRN):  acetaminophen     Tablet .. 650 milliGRAM(s) Oral every 6 hours PRN Temp greater or equal to 38C (100.4F), Mild Pain (1 - 3)  aluminum hydroxide/magnesium hydroxide/simethicone Suspension 30 milliLiter(s) Oral every 4 hours PRN Dyspepsia  dextrose Oral Gel 15 Gram(s) Oral once PRN Blood Glucose LESS THAN 70 milliGRAM(s)/deciliter  melatonin 3 milliGRAM(s) Oral at bedtime PRN Insomnia  ondansetron Injectable 4 milliGRAM(s) IV Push every 8 hours PRN Nausea and/or Vomiting      Allergies    No Known Allergies    Intolerances        REVIEW OF SYSTEMS  Constitutional: No fever  Eyes: No blurry vision  Neuro: No tremors  HEENT: No pain  Cardiovascular: No chest pain, palpitations  Respiratory: No SOB, no cough  GI: No nausea, vomiting, abdominal pain  : No dysuria  Skin: no rash  Psych: no depression  Endocrine: no polyuria, polydipsia  Hem/lymph: no swelling  Osteoporosis: no fractures  ALL OTHER SYSTEMS REVIEWED AND NEGATIVE     UNABLE TO OBTAIN     PHYSICAL EXAM   Vital Signs Last 24 Hrs  T(C): 36.3 (21 Nov 2023 05:42), Max: 36.4 (21 Nov 2023 02:28)  T(F): 97.4 (21 Nov 2023 05:42), Max: 97.5 (21 Nov 2023 02:28)  HR: 75 (21 Nov 2023 05:42) (55 - 103)  BP: 103/65 (21 Nov 2023 05:42) (101/69 - 118/64)  BP(mean): --  RR: 18 (21 Nov 2023 05:42) (18 - 20)  SpO2: 97% (21 Nov 2023 05:42) (97% - 99%)    Parameters below as of 21 Nov 2023 02:28  Patient On (Oxygen Delivery Method): room air      GENERAL: NAD, well-groomed, well-developed  EYES: No proptosis, no lid lag, anicteric  HEENT:  Atraumatic, Normocephalic, moist mucous membranes  THYROID: Normal size, no palpable nodules  RESPIRATORY: Clear to auscultation bilaterally; No rales, rhonchi, wheezing  CARDIOVASCULAR: Regular rate and rhythm; No murmurs; no peripheral edema  GI: Soft, nontender, non distended, normal bowel sounds  SKIN: Dry, intact, No rashes or lesions  MUSCULOSKELETAL: Full range of motion, normal strength  NEURO: sensation intact, extraocular movements intact, no tremor  PSYCH: Alert and oriented x 3, normal affect, normal mood  CUSHING'S SIGNS: no striae    CAPILLARY BLOOD GLUCOSE      POCT Blood Glucose.: 208 mg/dL (21 Nov 2023 08:18)  POCT Blood Glucose.: 315 mg/dL (21 Nov 2023 04:04)      A1C with Estimated Average Glucose Result: 13.4 % (11-20-23 @ 22:35)                            14.2   10.77 )-----------( 329      ( 20 Nov 2023 17:48 )             40.7       11-20    132<L>  |  98  |  18  ----------------------------<  530<HH>  4.6   |  21<L>  |  0.42<L>    Ca    10.0      20 Nov 2023 17:48    TPro  8.0  /  Alb  4.4  /  TBili  0.2  /  DBili  x   /  AST  10  /  ALT  19  /  AlkPhos  189<H>  11-20          LIPIDS    RADIOLOGY ENDOCRINE INITIAL CONSULT - diabetes    HPI:  Likely 2/2 to receiving ativan and haldol, patient is completely unresponsive to stimuli other than pain. No phone contact number available. Previous doctor not in hospital currently. Hx obtained via chart review only. Need medication reconciliation.    53F Urdu speaking patient with reported history of schizophrenia, diabetes is presenting due to possible psychiatric complaints/AMS.      Previously, per the patient, she stated her cousin called the ambulance on her today because she was causing a "domestic issue".  She stated that she "thinks her family called the ambulance because she talks too much". Reportedly she has not been taking her medications, however the patient did not know what medicine she is supposed to be on.  When asked if she wants to kill herself she says yes.  She does not elaborate on why or how she would do it.  She does admit to hearing voices she states that she hears people making noises and crying and making a mess.  She lives in a shelter.  Denies any recreational drug use.  No other complaints.   (21 Nov 2023 04:56)      ENDOCRINE HISTORY   [place]  [facility] ID [id number] used.  Diagnosed with DM: type 2 diabetes, 2 years ago  Last HbA1c: 13.4  Endocrinologist: does not have  Home DM Meds: reports taking espirinone  Microvascular complications: denies  Macrovascular complications: denies MI or CVA  SMBG: does not check  Symptoms: endorses polyuria, polydipsia, neuropathy  Diet at home: lots of water, eats a little bit  Appetite in hospital: good  Exercise: aerobics  PMHx: as above  PSHx: as above  Family hx: denies family history of diabetes  Social hx: former tobacco user, denies alcohol use or drug use      PAST MEDICAL & SURGICAL HISTORY:  Schizophrenia          FAMILY HISTORY:      Social History:  Lives in a shelter (21 Nov 2023 04:56)      Home Medications:      MEDICATIONS  (STANDING):  dextrose 5%. 1000 milliLiter(s) (100 mL/Hr) IV Continuous <Continuous>  dextrose 5%. 1000 milliLiter(s) (50 mL/Hr) IV Continuous <Continuous>  dextrose 50% Injectable 25 Gram(s) IV Push once  dextrose 50% Injectable 25 Gram(s) IV Push once  dextrose 50% Injectable 12.5 Gram(s) IV Push once  glucagon  Injectable 1 milliGRAM(s) IntraMuscular once  insulin lispro (ADMELOG) corrective regimen sliding scale   SubCutaneous three times a day before meals    MEDICATIONS  (PRN):  acetaminophen     Tablet .. 650 milliGRAM(s) Oral every 6 hours PRN Temp greater or equal to 38C (100.4F), Mild Pain (1 - 3)  aluminum hydroxide/magnesium hydroxide/simethicone Suspension 30 milliLiter(s) Oral every 4 hours PRN Dyspepsia  dextrose Oral Gel 15 Gram(s) Oral once PRN Blood Glucose LESS THAN 70 milliGRAM(s)/deciliter  melatonin 3 milliGRAM(s) Oral at bedtime PRN Insomnia  ondansetron Injectable 4 milliGRAM(s) IV Push every 8 hours PRN Nausea and/or Vomiting      Allergies    No Known Allergies    Intolerances        REVIEW OF SYSTEMS  Constitutional: No fever  Eyes: No blurry vision  Neuro: No tremors  HEENT: No pain  Cardiovascular: endorses chest pain, palpitations  Respiratory: endorses SOB, no cough  GI: endorses nausea, vomiting, abdominal pain  : No dysuria  Skin: no rash  Psych: no depression  Endocrine: no polyuria, polydipsia  Hem/lymph: no swelling  Osteoporosis: no fractures  ALL OTHER SYSTEMS REVIEWED AND NEGATIVE       PHYSICAL EXAM   Vital Signs Last 24 Hrs  T(C): 36.3 (21 Nov 2023 05:42), Max: 36.4 (21 Nov 2023 02:28)  T(F): 97.4 (21 Nov 2023 05:42), Max: 97.5 (21 Nov 2023 02:28)  HR: 75 (21 Nov 2023 05:42) (55 - 103)  BP: 103/65 (21 Nov 2023 05:42) (101/69 - 118/64)  BP(mean): --  RR: 18 (21 Nov 2023 05:42) (18 - 20)  SpO2: 97% (21 Nov 2023 05:42) (97% - 99%)    Parameters below as of 21 Nov 2023 02:28  Patient On (Oxygen Delivery Method): room air    Patient declined physical exam  GENERAL: NAD, sitting up in bed  EYES: No proptosis, anicteric  HEENT:  Atraumatic, Normocephalic, dry mucous membranes  THYROID: did not appear enlarged  RESPIRATORY: non paradoxical breathing, on room air  CARDIOVASCULAR: did not appear cynaotic no peripheral edema visualized  GI: did not appear distended  SKIN: Dry, intact,   MUSCULOSKELETAL: Full range of motion, normal strength  NEURO: no tremor  PSYCH: flat affect, normal mood  CUSHING'S SIGNS: no acanthosis, no dorsocervical fat pad    CAPILLARY BLOOD GLUCOSE      POCT Blood Glucose.: 208 mg/dL (21 Nov 2023 08:18)  POCT Blood Glucose.: 315 mg/dL (21 Nov 2023 04:04)      A1C with Estimated Average Glucose Result: 13.4 % (11-20-23 @ 22:35)                            14.2   10.77 )-----------( 329      ( 20 Nov 2023 17:48 )             40.7       11-20    132<L>  |  98  |  18  ----------------------------<  530<HH>  4.6   |  21<L>  |  0.42<L>    Ca    10.0      20 Nov 2023 17:48    TPro  8.0  /  Alb  4.4  /  TBili  0.2  /  DBili  x   /  AST  10  /  ALT  19  /  AlkPhos  189<H>  11-20          LIPIDS    RADIOLOGY

## 2023-11-22 LAB
ANION GAP SERPL CALC-SCNC: 11 MMOL/L — SIGNIFICANT CHANGE UP (ref 5–17)
ANION GAP SERPL CALC-SCNC: 11 MMOL/L — SIGNIFICANT CHANGE UP (ref 5–17)
BUN SERPL-MCNC: 12 MG/DL — SIGNIFICANT CHANGE UP (ref 7–23)
BUN SERPL-MCNC: 12 MG/DL — SIGNIFICANT CHANGE UP (ref 7–23)
CALCIUM SERPL-MCNC: 9.4 MG/DL — SIGNIFICANT CHANGE UP (ref 8.4–10.5)
CALCIUM SERPL-MCNC: 9.4 MG/DL — SIGNIFICANT CHANGE UP (ref 8.4–10.5)
CHLORIDE SERPL-SCNC: 101 MMOL/L — SIGNIFICANT CHANGE UP (ref 96–108)
CHLORIDE SERPL-SCNC: 101 MMOL/L — SIGNIFICANT CHANGE UP (ref 96–108)
CO2 SERPL-SCNC: 23 MMOL/L — SIGNIFICANT CHANGE UP (ref 22–31)
CO2 SERPL-SCNC: 23 MMOL/L — SIGNIFICANT CHANGE UP (ref 22–31)
CREAT SERPL-MCNC: 0.41 MG/DL — LOW (ref 0.5–1.3)
CREAT SERPL-MCNC: 0.41 MG/DL — LOW (ref 0.5–1.3)
EGFR: 119 ML/MIN/1.73M2 — SIGNIFICANT CHANGE UP
EGFR: 119 ML/MIN/1.73M2 — SIGNIFICANT CHANGE UP
FOLATE SERPL-MCNC: 8.2 NG/ML — SIGNIFICANT CHANGE UP
FOLATE SERPL-MCNC: 8.2 NG/ML — SIGNIFICANT CHANGE UP
GLUCOSE BLDC GLUCOMTR-MCNC: 159 MG/DL — HIGH (ref 70–99)
GLUCOSE BLDC GLUCOMTR-MCNC: 159 MG/DL — HIGH (ref 70–99)
GLUCOSE BLDC GLUCOMTR-MCNC: 256 MG/DL — HIGH (ref 70–99)
GLUCOSE BLDC GLUCOMTR-MCNC: 256 MG/DL — HIGH (ref 70–99)
GLUCOSE BLDC GLUCOMTR-MCNC: 308 MG/DL — HIGH (ref 70–99)
GLUCOSE BLDC GLUCOMTR-MCNC: 308 MG/DL — HIGH (ref 70–99)
GLUCOSE BLDC GLUCOMTR-MCNC: 464 MG/DL — CRITICAL HIGH (ref 70–99)
GLUCOSE BLDC GLUCOMTR-MCNC: 464 MG/DL — CRITICAL HIGH (ref 70–99)
GLUCOSE BLDC GLUCOMTR-MCNC: 508 MG/DL — CRITICAL HIGH (ref 70–99)
GLUCOSE BLDC GLUCOMTR-MCNC: 508 MG/DL — CRITICAL HIGH (ref 70–99)
GLUCOSE SERPL-MCNC: 300 MG/DL — HIGH (ref 70–99)
GLUCOSE SERPL-MCNC: 300 MG/DL — HIGH (ref 70–99)
POTASSIUM SERPL-MCNC: 3.9 MMOL/L — SIGNIFICANT CHANGE UP (ref 3.5–5.3)
POTASSIUM SERPL-MCNC: 3.9 MMOL/L — SIGNIFICANT CHANGE UP (ref 3.5–5.3)
POTASSIUM SERPL-SCNC: 3.9 MMOL/L — SIGNIFICANT CHANGE UP (ref 3.5–5.3)
POTASSIUM SERPL-SCNC: 3.9 MMOL/L — SIGNIFICANT CHANGE UP (ref 3.5–5.3)
SODIUM SERPL-SCNC: 135 MMOL/L — SIGNIFICANT CHANGE UP (ref 135–145)
SODIUM SERPL-SCNC: 135 MMOL/L — SIGNIFICANT CHANGE UP (ref 135–145)
T4 FREE SERPL-MCNC: 1.2 NG/DL — SIGNIFICANT CHANGE UP (ref 0.9–1.8)
T4 FREE SERPL-MCNC: 1.2 NG/DL — SIGNIFICANT CHANGE UP (ref 0.9–1.8)
TSH SERPL-MCNC: 2.44 UIU/ML — SIGNIFICANT CHANGE UP (ref 0.27–4.2)
TSH SERPL-MCNC: 2.44 UIU/ML — SIGNIFICANT CHANGE UP (ref 0.27–4.2)
VIT B12 SERPL-MCNC: 856 PG/ML — SIGNIFICANT CHANGE UP (ref 232–1245)
VIT B12 SERPL-MCNC: 856 PG/ML — SIGNIFICANT CHANGE UP (ref 232–1245)

## 2023-11-22 PROCEDURE — 99231 SBSQ HOSP IP/OBS SF/LOW 25: CPT

## 2023-11-22 PROCEDURE — 99233 SBSQ HOSP IP/OBS HIGH 50: CPT

## 2023-11-22 RX ORDER — INSULIN GLARGINE 100 [IU]/ML
15 INJECTION, SOLUTION SUBCUTANEOUS AT BEDTIME
Refills: 0 | Status: DISCONTINUED | OUTPATIENT
Start: 2023-11-22 | End: 2023-11-23

## 2023-11-22 RX ORDER — INSULIN LISPRO 100/ML
VIAL (ML) SUBCUTANEOUS AT BEDTIME
Refills: 0 | Status: DISCONTINUED | OUTPATIENT
Start: 2023-11-22 | End: 2023-11-30

## 2023-11-22 RX ORDER — INSULIN LISPRO 100/ML
6 VIAL (ML) SUBCUTANEOUS
Refills: 0 | Status: DISCONTINUED | OUTPATIENT
Start: 2023-11-22 | End: 2023-11-23

## 2023-11-22 RX ADMIN — Medication 3 UNIT(S): at 12:23

## 2023-11-22 RX ADMIN — Medication 6: at 17:56

## 2023-11-22 RX ADMIN — Medication 4: at 09:30

## 2023-11-22 RX ADMIN — INSULIN GLARGINE 15 UNIT(S): 100 INJECTION, SOLUTION SUBCUTANEOUS at 22:14

## 2023-11-22 RX ADMIN — Medication 3 UNIT(S): at 09:35

## 2023-11-22 RX ADMIN — Medication 3: at 12:22

## 2023-11-22 RX ADMIN — Medication 6 UNIT(S): at 17:56

## 2023-11-22 NOTE — DIETITIAN INITIAL EVALUATION ADULT - ENERGY INTAKE
Adequate (%) - Per PCA, pt with good appetite/PO intake in-house, consuming ~100% of meal this AM (11/22).

## 2023-11-22 NOTE — BH CONSULTATION LIAISON PROGRESS NOTE - NSBHASSESSMENTFT_PSY_ALL_CORE
53F Azeri speaking patient with reported history of schizophrenia, diabetes presented due to possible psychiatric complaints/AMS. Today, patient was eating lunch and did not want to speak. Told her that we would come back later today. Social work was also present and despite use of a  the patient did not want to engage. The PCA states that she has been intermittently getting up to walk around.  She also did so at multiple points while in her room today, always without warning. She has not tried to harm herself or others. She slept last night, with intermittent awakening and wandering.    Plan THE FOLLOWING PLAN IS MADE BY THE MEDICAL STUDENT PLEASE LOOK AT FINAL RECOMMENDATION  -One to one observation for possible SI  -Head CT  -EKG for Qtc monitoring should neuroleptic agents need to be used   -PRN: Haldol 5 mg PO/IM/IV q 6Hrs PRN severe agitation if Qtc < 500   -Pt. may warrant admission to psychiatry once medically cleared 53F Georgian speaking patient with reported history of schizophrenia, diabetes presented due to possible psychiatric complaints/AMS. Today, patient was eating lunch and did not want to speak. Told her that we would come back later today. Social work was also present and despite use of a  the patient did not want to engage. The PCA states that she has been intermittently getting up to walk around.  She also did so at multiple points while in her room today, always without warning. She has not tried to harm herself or others. She slept last night, with intermittent awakening and wandering.

## 2023-11-22 NOTE — DIETITIAN INITIAL EVALUATION ADULT - REASON INDICATOR FOR ASSESSMENT
RD Consult Indicated for: MST Score 2 or >  Source: Team, Electronic Medical Record; Unable to interview pt at this time secondary to pt restless, AMS.   Chart reviewed, events noted.

## 2023-11-22 NOTE — DIETITIAN INITIAL EVALUATION ADULT - NAME AND PHONE
Progress Note - Neurology   Lupis Oro  68 y o  male MRN: [id number]  Unit/Bed#: [facility] 503-01 Encounter: [id number]    Assessment/Plan:  1  Encephalopathy  -Etiology unclear  Likely secondary to suspected COPD exacerbation  O2 sats improved over the past day  Patient alert and oriented to person, place, situation, and month  Appears essentially back to baseline    -No major electrolyte abnormalities  Continue to monitor  -TSH, B12, folate WNL  -Patient afebrile  No leukocytosis  Influenza A and B and RSV negative  MRSA culture negative  UA negative   -No seizure activity noted  No need for EEG at this time   -No need for further neuro imaging   -No further recommendations at this time    2  COPD  -O2 sats improved  -CXR negative for active pulmonary disease  -Pulmonary consult  -Per primary team    3  H/o stroke  -Residual left-sided hemiparesis, appears at baseline  -Continue dual antiplatelet therapy and statin    4  Ambulatory dysfunction  -PT/OT    Subjective:   Lupis Oro  is a 68 y o  male with past medical history of stroke ~10 years ago with residual left-sided weakness, ambulatory dysfunction, history of lung cancer s/p lung resection, COPD, CKD, hypertension, hyperlipidemia who presents with recurrent falls from assisted living facility with generalized weakness and degree of altered mental status  CT head negative for acute intracranial abnormality  Today on exam, patient is sleeping comfortably in bed patient is sleeping comfortably in bed, easily arousable to verbal stimuli  States he feels good and slept well  No current complaints  Denies CP, SOB, headache, dizziness, vision changes, N/V, abdominal pain, weakness or numbness  ROS:  12 point ROS performed, as stated above, all others negative      Scheduled Meds:  Current Facility-Administered Medications:  acetaminophen 650 mg Oral Q6H PRN Katina Poole MD   albuterol 2 puff Inhalation 4x Daily Katina Poole MD albuterol 2 5 mg Nebulization Q4H PRN Nanda Valera MD   amLODIPine 10 mg Oral Daily Marixa Crowell MD   aspirin 81 mg Oral Every Other Day Marixa Crowell MD   atorvastatin 10 mg Oral Daily With Lucas Encinas MD   benzonatate 100 mg Oral TID PRN Marixa Crowell MD   bimatoprost 1 drop Both Eyes HS Marixa Crowell MD   carvedilol 12 5 mg Oral BID With Meals Marixa Crowell MD   clopidogrel 75 mg Oral Daily Marixa Croewll MD   enoxaparin 40 mg Subcutaneous Daily Marixa Crowell MD   fluticasone-salmeterol 1 puff Inhalation Q12H [address] [place] [address], MD   LORazepam 0 25 mg Oral Daily Marixa Crowell MD   LORazepam 0 5 mg Oral HS Marixa Crowell MD   meclizine 25 mg Oral TID PRN Marixa Crowell MD   ondansetron 4 mg Intravenous Q6H PRN Marixa Crowell MD   pantoprazole 20 mg Oral Early Morning Marixa Crowell MD   PARoxetine 20 mg Oral Daily Marixa Crowell MD   predniSONE 40 mg Oral Daily Nanda Valera MD   spironolactone 25 mg Oral Daily Marixa Crowell MD   tamsulosin 0 4 mg Oral Daily With Lucas Encinas MD   tiotropium 18 mcg Inhalation Daily Marixa Crowell MD   tolterodine 2 mg Oral Daily Marixa Crowell MD     Continuous Infusions:   PRN Meds:   acetaminophen    albuterol    benzonatate    meclizine    ondansetron    Vitals: Blood pressure 168/77, pulse 59, temperature 97 6 °F (36 4 °C), resp  rate 18, height 6' 5" (1 956 m), weight 104 kg (230 lb), SpO2 96 %  ,Body mass index is 27 27 kg/m²  Physical Exam:  Physical Exam   Constitutional: No distress  HENT:   Head: Normocephalic and atraumatic  Eyes: EOM are normal  Pupils are equal, round, and reactive to light  Neck: Normal range of motion  Neck supple  Cardiovascular: Normal rate and regular rhythm  Pulmonary/Chest: Effort normal and breath sounds normal    Abdominal: Soft   Bowel sounds are normal    Neurological:   Reflex Scores:       Bicep reflexes are 1+ on the right side and 1+ on the left side  Brachioradialis reflexes are 1+ on the right side and 1+ on the left side  Patellar reflexes are 0 on the right side and 0 on the left side  Achilles reflexes are 0 on the right side and 0 on the left side  Skin: Skin is warm and dry  He is not diaphoretic  Psychiatric: He has a normal mood and affect  His behavior is normal  Judgment and thought content normal      Neurologic Exam     Mental Status   Patient alert and oriented to person and place  Initially states month is November, but self corrects to February  Unsure of the year stating 2016  Able to state president  Able to perform simple calculations  Able to repeat long sentences without difficulty  Appropriate insight for why he is here  Cranial Nerves     CN II   Visual fields full to confrontation  CN III, IV, VI   Pupils are equal, round, and reactive to light  Extraocular motions are normal    Upgaze: normal  Downgaze: normal  Conjugate gaze: present    CN VIII   CN VIII normal      CN IX, X   CN IX normal      CN XI   CN XI normal      CN XII   CN XII normal    Tongue deviation: none  Chronic left facial droop with decreased sensation to light touch compared to right  Motor Exam   Right-sided strength 5/5 throughout  Seemingly symmetric strength left upper extremity compared to right  Slight decrease in strength, 4+/5- left hip flexion compared to right  Seemingly symmetric strength rest of the left lower extremity compared to right  Mild pronator drift present left upper extremity  Sensory Exam   Chronic decreased sensation to light touch left side versus right, did not extinguish  Vibration sense decreased lower extremities bilaterally       Gait, Coordination, and Reflexes     Reflexes   Right brachioradialis: 1+  Left brachioradialis: 1+  Right biceps: 1+  Left biceps: 1+  Right patellar: 0  Left patellar: 0  Right achilles: 0  Left achilles: 0  Right plantar: equivocal  Left plantar: equivocal  Finger to nose:  Mild ataxia with left, chronic  Right normal   Gait deferred  Lab, Imaging and other studies: No new neuro imaging  VTE Prophylaxis: Enoxaparin (Lovenox)    Counseling / Coordination of Care  Total time spent today 30 minutes  Greater than 50% of total time was spent with the patient and / or family counseling and / or coordination of care  A description of the counseling / coordination of care: Patient seen and evaluated  Discussed with attending  Chart reviewed thoroughly including laboratory and imaging studies  Plan of care discussed with patient  Maru Atkinson RD Available via Teams

## 2023-11-22 NOTE — DIETITIAN INITIAL EVALUATION ADULT - EDUCATION DIETARY MODIFICATIONS
Unable to provide DM education at this time secondary to pt restless/AMS. RD to leave printed DM Nutrition Therapy Education handouts (Hungarian) at bedside. Will continue to follow up to provide education as able./(0) unable to meet; needs instruction

## 2023-11-22 NOTE — DIETITIAN INITIAL EVALUATION ADULT - ORAL INTAKE PTA/DIET HISTORY
Unable to obtain pt's subjective dietary hx secondary to pt restless with AMS. Per chart, pt lives at a shelter. Per chart, no micronutrient/protein supplementation noted. No known food allergies/intolerances noted. No chewing/swallowing difficulties reported at this time.

## 2023-11-22 NOTE — DIETITIAN INITIAL EVALUATION ADULT - PERTINENT MEDS FT
MEDICATIONS  (STANDING):  dextrose 5%. 1000 milliLiter(s) (100 mL/Hr) IV Continuous <Continuous>  dextrose 5%. 1000 milliLiter(s) (50 mL/Hr) IV Continuous <Continuous>  dextrose 50% Injectable 25 Gram(s) IV Push once  dextrose 50% Injectable 25 Gram(s) IV Push once  dextrose 50% Injectable 12.5 Gram(s) IV Push once  glucagon  Injectable 1 milliGRAM(s) IntraMuscular once  insulin glargine Injectable (LANTUS) 15 Unit(s) SubCutaneous at bedtime  insulin lispro (ADMELOG) corrective regimen sliding scale   SubCutaneous at bedtime  insulin lispro (ADMELOG) corrective regimen sliding scale   SubCutaneous three times a day before meals  insulin lispro Injectable (ADMELOG) 6 Unit(s) SubCutaneous three times a day before meals    MEDICATIONS  (PRN):  acetaminophen     Tablet .. 650 milliGRAM(s) Oral every 6 hours PRN Temp greater or equal to 38C (100.4F), Mild Pain (1 - 3)  aluminum hydroxide/magnesium hydroxide/simethicone Suspension 30 milliLiter(s) Oral every 4 hours PRN Dyspepsia  dextrose Oral Gel 15 Gram(s) Oral once PRN Blood Glucose LESS THAN 70 milliGRAM(s)/deciliter  melatonin 3 milliGRAM(s) Oral at bedtime PRN Insomnia  ondansetron Injectable 4 milliGRAM(s) IV Push every 8 hours PRN Nausea and/or Vomiting

## 2023-11-22 NOTE — BH CONSULTATION LIAISON PROGRESS NOTE - NSBHATTESTCOMMENTATTENDFT_PSY_A_CORE
home This is a 53-y.o. HF patient, Welsh-speaking, with reported history of schizophrenia, diabetes is presenting due to possible psychiatric complaints/AMS. Patient was found wandering and is not cooperative. Admitted for hyperglycemia. Consult requested to evaluate patient for bizarre behavior.    I have seen and evaluated this patient myself. Chart, labs, meds reviewed. I agree with trainee's assessment and plan. Patient prevalently negativistic, refusing to cooperate or engage with care providers, remains guarded,nonverbal, disorganized behavior on the unit, pacing. rec cont seroquel, haldol prn. pt will need psych admission, 2pc legals left in chart

## 2023-11-22 NOTE — DIETITIAN INITIAL EVALUATION ADULT - ADD RECOMMEND
1) Continue with Consistent Carbohydrate Diet; Defer texture/consistency to Speech Language Pathologist prn.   2) Add oral nutrition supplementation: [employer] Glucose Support Protein Shake (per serving provides 16 g PRO, 300 kcal) 1x/day to optimize caloric intake.   3) Consider adding multivitamin for micronutrient coverage, pending no medical contraindications.   4) Provide DM education as able/appropriate.   5) RD will continue to monitor PO intake, GI tolerance, weight trends, skin integrity, BMs, labs/electrolytes prn.   RD remains available upon request.

## 2023-11-22 NOTE — DIETITIAN INITIAL EVALUATION ADULT - PERTINENT LABORATORY DATA
11-22    135  |  101  |  12  ----------------------------<  300<H>  3.9   |  23  |  0.41<L>    Ca    9.4      22 Nov 2023 07:03    TPro  8.0  /  Alb  4.4  /  TBili  0.2  /  DBili  x   /  AST  10  /  ALT  19  /  AlkPhos  189<H>  11-20  POCT Blood Glucose.: 256 mg/dL (11-22-23 @ 12:12)  A1C with Estimated Average Glucose Result: 13.4 % (11-20-23 @ 22:35)

## 2023-11-22 NOTE — DIETITIAN INITIAL EVALUATION ADULT - NSFNSADHERENCEPTAFT_GEN_A_CORE
- Hx of T2DM; per chart, pt does not follow with an outpt endocrinologist; endorsed polyuria, polydyspia likely in setting of uncontrolled blood glucose levels PTA; reports taking DM medications, but unsure which ones.

## 2023-11-22 NOTE — PHARMACOTHERAPY INTERVENTION NOTE - COMMENTS
Author went to patient's bedside to complete medication reconciliation, however, patient was unable to share any medications she was on or a pharmacy name where she would get medications from.  Patient's medication history not found with [employer].  Author unable to finish medication reconciliation at this time.     Yamel Rowell, PharmD, [facility]  Clinical Pharmacy Specialist  Available on Teams

## 2023-11-22 NOTE — PROVIDER CONTACT NOTE (OTHER) - BACKGROUND
pt admitted with hyperglycemia, pmh of schizophrenia, HTN, HLD, suicidal ideation
Admitted for hyperglycemia
admitted w/hyperglycemia

## 2023-11-22 NOTE — PROGRESS NOTE ADULT - PROBLEM SELECTOR PLAN 3
-Suspect metabolic from hyperglycemia which is Improving with reported psychiatric history. No family available to obtain collateral.   - Diabetic diet

## 2023-11-22 NOTE — PROVIDER CONTACT NOTE (OTHER) - ASSESSMENT
agitated, refusing any patient care. threw lunch tray on floor
pt is asymptomatic, mental status at baseline, v/s stable
pt AOX3 ,refused to check temp ,refused CT Scan

## 2023-11-22 NOTE — DIETITIAN INITIAL EVALUATION ADULT - NSFNSGIIOFT_GEN_A_CORE
I&O's Detail    21 Nov 2023 07:01  -  22 Nov 2023 07:00  --------------------------------------------------------  IN:    Oral Fluid: 200 mL  Total IN: 200 mL    OUT:  Total OUT: 0 mL    Total NET: 200 mL

## 2023-11-22 NOTE — PROGRESS NOTE ADULT - PROBLEM SELECTOR PLAN 2
Per chart review, patient expressed suicidal ideation  - 1:1 observation  - Psych consulted, evaluation ongoing but pt hasn't participated in talks

## 2023-11-22 NOTE — BH CONSULTATION LIAISON PROGRESS NOTE - NSBHFUPINTERVALHXFT_PSY_A_CORE
53F Croatian speaking patient with reported history of schizophrenia, diabetes presented due to possible psychiatric complaints/AMS. Today, patient was eating lunch and did not want to speak. Told her that we would come back later today. Social work was also present and despite use of a  the patient did not want to engage. The PCA states that she has been intermittently getting up to walk around.  She also did so at multiple points while in her room today, always without warning. She has not tried to harm herself or others. She slept last night, with intermittent awakening and wandering.

## 2023-11-22 NOTE — DIETITIAN INITIAL EVALUATION ADULT - REASON
Nutrition Focused Physical Exam deferred at this time secondary to pt preference/restless. RD will continue to reassess as able.

## 2023-11-22 NOTE — DIETITIAN INITIAL EVALUATION ADULT - OTHER CALCULATIONS
Used current dosing wt of 45.4 kg (11/20) for caloric, protein, and fluid needs in setting of BMI = 19.6.

## 2023-11-22 NOTE — DIETITIAN INITIAL EVALUATION ADULT - OTHER INFO
- Per chart, pt with hx of schizophrenia; expressed suicidal ideation.   -     Endo:  - T2DM. HgbA1C of 13.4% (11/20) - reflects uncontrolled glycemic control. Elevated POCTs x 24 hrs: 258 - 331 mg/dL. Per chart, AMS likely metabolic secondary to hyperglycemia. Ordered for in-house insulin regimen: Lantus 12 units, ADMELOG 3 units 3x/day, ADMELOG SSI. RD will continue to monitor blood glucose levels prn. Per endocrinology, pt to be d/c with glucometer and test strips.     Wt Hx:  - Unable to obtain pt's UBW at this time. Per chart, dosing wt of 45.4 kg (11/20). Unable to obtain pt's bedscale wt secondary to walking/standing.   - No further wt hx available in [facility]. RD will continue to monitor weight trends as able/available.   - IBW: 45.5 kg (based on ht of 60 in)

## 2023-11-22 NOTE — BH CONSULTATION LIAISON PROGRESS NOTE - NSBHCHARTREVIEWLAB_PSY_A_CORE FT
POCT Blood Glucose (11.22.23 @ 12:12)   POCT Blood Glucose.: 256 mg/dL  Vitamin B12, Serum in AM (11.22.23 @ 07:04)   Vitamin B12, Serum: 856 pg/mL  Free Thyroxine, Serum in AM (11.22.23 @ 07:04)   Free Thyroxine, Serum: 1.2 ng/dL  Thyroid Stimulating Hormone, Serum in AM (11.22.23 @ 07:04)   Thyroid Stimulating Hormone, Serum: 2.44 uIU/mL  Folate, Serum in AM (11.22.23 @ 07:04)   Folate, Serum: 8.2 ng/mL  Basic Metabolic Panel in AM (11.22.23 @ 07:03)   Sodium: 135 mmol/L  Potassium: 3.9 mmol/L  Chloride: 101 mmol/L  Carbon Dioxide: 23 mmol/L  Anion Gap: 11 mmol/L  Blood Urea Nitrogen: 12 mg/dL  Creatinine: 0.41 mg/dL  Glucose: 300 mg/dL  Calcium: 9.4 mg/dL  eGFR: 119

## 2023-11-22 NOTE — PROVIDER CONTACT NOTE (OTHER) - SITUATION
pt refused CT Sacn ,pt Aox3 ,on 1;1 FOR SUICIDAL ideation ,pt refused to get up from bed , explained in Maldivian,REFUSED to check temp
fingerstick done, blood sugar 500, repeat 463
Patient refusing blood glucose check

## 2023-11-22 NOTE — DIETITIAN INITIAL EVALUATION ADULT - NSFNSGIASSESSMENTFT_GEN_A_CORE
Per PCA, no N/V nor diarrhea or constipation reported at this time. Per chart, last BM PTA. Not ordered for bowel regimen at this time.

## 2023-11-23 LAB
GLUCOSE BLDC GLUCOMTR-MCNC: 117 MG/DL — HIGH (ref 70–99)
GLUCOSE BLDC GLUCOMTR-MCNC: 117 MG/DL — HIGH (ref 70–99)
GLUCOSE BLDC GLUCOMTR-MCNC: 180 MG/DL — HIGH (ref 70–99)
GLUCOSE BLDC GLUCOMTR-MCNC: 180 MG/DL — HIGH (ref 70–99)
GLUCOSE BLDC GLUCOMTR-MCNC: 238 MG/DL — HIGH (ref 70–99)
GLUCOSE BLDC GLUCOMTR-MCNC: 238 MG/DL — HIGH (ref 70–99)
GLUCOSE BLDC GLUCOMTR-MCNC: 310 MG/DL — HIGH (ref 70–99)
GLUCOSE BLDC GLUCOMTR-MCNC: 310 MG/DL — HIGH (ref 70–99)

## 2023-11-23 PROCEDURE — 99231 SBSQ HOSP IP/OBS SF/LOW 25: CPT

## 2023-11-23 PROCEDURE — 99232 SBSQ HOSP IP/OBS MODERATE 35: CPT

## 2023-11-23 RX ORDER — INSULIN GLARGINE 100 [IU]/ML
18 INJECTION, SOLUTION SUBCUTANEOUS AT BEDTIME
Refills: 0 | Status: DISCONTINUED | OUTPATIENT
Start: 2023-11-23 | End: 2023-11-30

## 2023-11-23 RX ORDER — INSULIN LISPRO 100/ML
6 VIAL (ML) SUBCUTANEOUS
Refills: 0 | Status: DISCONTINUED | OUTPATIENT
Start: 2023-11-24 | End: 2023-11-30

## 2023-11-23 RX ORDER — HALOPERIDOL DECANOATE 100 MG/ML
5 INJECTION INTRAMUSCULAR ONCE
Refills: 0 | Status: COMPLETED | OUTPATIENT
Start: 2023-11-23 | End: 2023-11-23

## 2023-11-23 RX ORDER — INSULIN LISPRO 100/ML
8 VIAL (ML) SUBCUTANEOUS
Refills: 0 | Status: DISCONTINUED | OUTPATIENT
Start: 2023-11-24 | End: 2023-11-28

## 2023-11-23 RX ORDER — INSULIN LISPRO 100/ML
6 VIAL (ML) SUBCUTANEOUS
Refills: 0 | Status: DISCONTINUED | OUTPATIENT
Start: 2023-11-24 | End: 2023-11-27

## 2023-11-23 RX ADMIN — HALOPERIDOL DECANOATE 5 MILLIGRAM(S): 100 INJECTION INTRAMUSCULAR at 11:01

## 2023-11-23 RX ADMIN — Medication 6 UNIT(S): at 18:23

## 2023-11-23 RX ADMIN — Medication 4: at 18:22

## 2023-11-23 RX ADMIN — Medication 2: at 09:11

## 2023-11-23 RX ADMIN — Medication 6 UNIT(S): at 13:11

## 2023-11-23 RX ADMIN — INSULIN GLARGINE 18 UNIT(S): 100 INJECTION, SOLUTION SUBCUTANEOUS at 22:47

## 2023-11-23 RX ADMIN — Medication 1: at 13:10

## 2023-11-23 RX ADMIN — Medication 6 UNIT(S): at 09:11

## 2023-11-23 NOTE — BH CONSULTATION LIAISON PROGRESS NOTE - NSBHASSESSMENTFT_PSY_ALL_CORE
53F Lithuanian speaking patient with reported history of schizophrenia, diabetes presented due to possible psychiatric complaints/AMS. Today, patient was eating lunch and did not want to speak. Told her that we would come back later today. Social work was also present and despite use of a  the patient did not want to engage. The PCA states that she has been intermittently getting up to walk around.  She also did so at multiple points while in her room today, always without warning. She has not tried to harm herself or others. She slept last night, with intermittent awakening and wandering.

## 2023-11-23 NOTE — CHART NOTE - NSCHARTNOTEFT_GEN_A_CORE
Patient is a 53 F with history of schizoprehnia and T2D here for AMS. Endocrinology consulted for diabetes management.   BGs and insulin regimen reviewed. BGs variable 100s-200s with fasting hyperglycemia and severe hyperglycemia at predinner       POCT Blood Glucose:  310 mg/dL (11-23-23 @ 17:41)  180 mg/dL (11-23-23 @ 13:05)  238 mg/dL (11-23-23 @ 08:59)  159 mg/dL (11-22-23 @ 21:41)      eMAR:  insulin glargine Injectable (LANTUS)   15 Unit(s) SubCutaneous (11-22-23 @ 22:14)    insulin lispro (ADMELOG) corrective regimen sliding scale   4 Unit(s) SubCutaneous (11-23-23 @ 18:22)   1 Unit(s) SubCutaneous (11-23-23 @ 13:10)   2 Unit(s) SubCutaneous (11-23-23 @ 09:11)    insulin lispro Injectable (ADMELOG)   6 Unit(s) SubCutaneous (11-23-23 @ 18:23)   6 Unit(s) SubCutaneous (11-23-23 @ 13:11)   6 Unit(s) SubCutaneous (11-23-23 @ 09:11)        # T2DM with hyperglycemia  - Most recent Hemoglobin A1C 13.4  - Home DM med: unclear, patient poor historian   - Current FS ranges from 200-300  - Current diet: CHO  - Please monitor blood glucose values TID AC & QHS while eating regular meals and Q6H while NPO  - Blood glucose goals pre-meal less than 140 mg/dL and random blood glucose less than 180 mg/dL  - Recommendations:  - Fasting glucose elevated, increase insulin Glargine 18 units QHS  - Adjust insulin Lispro to 6 -8-6 units TID with meals, hold if NPO or if eating less than 50% of meals  - Continue with low dose correctional scale TID with meals  - Continue with low dose correctional scale QHS  - Keep hypoglycemia protocol in place     Kerry Zeng NP-AUGIE   Contact via [employer] Teams during business hours  To reach covering provider access AMION via sun[employer] tools  For Urgent matters/after-hours/weekends/holidays please page endocrine fellow on call   For nonurgent matters please email [email or web address]    Please note that this patient may be followed by different provider tomorrow.  Notify endocrine 24 hours prior to discharge for final recommendations

## 2023-11-23 NOTE — BH CONSULTATION LIAISON PROGRESS NOTE - NSBHCHARTREVIEWLAB_PSY_A_CORE FT
POCT Blood Glucose (11.21.23 @ 08:18)   POCT Blood Glucose.: 208 mg/dL    Comprehensive Metabolic Panel (11.20.23 @ 17:48)   Sodium: 132 mmol/L  Potassium: 4.6 mmol/L  Chloride: 98 mmol/L  Carbon Dioxide: 21 mmol/L  Anion Gap: 13 mmol/L  Blood Urea Nitrogen: 18 mg/dL  Creatinine: 0.42 mg/dL  Glucose: 530 mg/dL  Calcium: 10.0 mg/dL  Protein Total: 8.0 g/dL  Albumin: 4.4 g/dL  Bilirubin Total: 0.2 mg/dL  Alkaline Phosphatase: 189 U/L  Aspartate Aminotransferase (AST/SGOT): 10 U/L  Alanine Aminotransferase (ALT/SGPT): 19 U/L  eGFR: 118    Complete Blood Count + Automated Diff (11.20.23 @ 17:48)   WBC Count: 10.77 K/uL  RBC Count: 4.67 M/uL  Hemoglobin: 14.2 g/dL  Hematocrit: 40.7 %  Mean Cell Volume: 87.2 fl  Mean Cell Hemoglobin: 30.4 pg  Mean Cell Hemoglobin Conc: 34.9 gm/dL  Red Cell Distrib Width: 12.1 %  Platelet Count - Automated: 329 K/uL     11-22    135  |  101  |  12  ----------------------------<  300<H>  3.9   |  23  |  0.41<L>    Ca    9.4      22 Nov 2023 07:03

## 2023-11-23 NOTE — BH CONSULTATION LIAISON PROGRESS NOTE - NSBHFUPINTERVALHXFT_PSY_A_CORE
53F Estonian speaking patient with reported history of schizophrenia, diabetes presented due to possible psychiatric complaints/AMS. Patient hypersomnolent on exam does not rise to verbal stimuli. Per 1:1 at bedside patient has been irritable, wanders the room and hallway and was licking the ground earlier. Patient needed frequent redirection with minimal improvement in behaviors. Not overtly combative.

## 2023-11-24 LAB
GLUCOSE BLDC GLUCOMTR-MCNC: 113 MG/DL — HIGH (ref 70–99)
GLUCOSE BLDC GLUCOMTR-MCNC: 113 MG/DL — HIGH (ref 70–99)
GLUCOSE BLDC GLUCOMTR-MCNC: 138 MG/DL — HIGH (ref 70–99)
GLUCOSE BLDC GLUCOMTR-MCNC: 138 MG/DL — HIGH (ref 70–99)
GLUCOSE BLDC GLUCOMTR-MCNC: 207 MG/DL — HIGH (ref 70–99)
GLUCOSE BLDC GLUCOMTR-MCNC: 207 MG/DL — HIGH (ref 70–99)
GLUCOSE BLDC GLUCOMTR-MCNC: 229 MG/DL — HIGH (ref 70–99)
GLUCOSE BLDC GLUCOMTR-MCNC: 229 MG/DL — HIGH (ref 70–99)

## 2023-11-24 PROCEDURE — 99232 SBSQ HOSP IP/OBS MODERATE 35: CPT

## 2023-11-24 PROCEDURE — 99233 SBSQ HOSP IP/OBS HIGH 50: CPT

## 2023-11-24 RX ORDER — QUETIAPINE FUMARATE 200 MG/1
100 TABLET, FILM COATED ORAL AT BEDTIME
Refills: 0 | Status: DISCONTINUED | OUTPATIENT
Start: 2023-11-24 | End: 2023-11-25

## 2023-11-24 RX ADMIN — Medication 6 UNIT(S): at 17:48

## 2023-11-24 RX ADMIN — Medication 2: at 09:05

## 2023-11-24 RX ADMIN — Medication 6 UNIT(S): at 09:05

## 2023-11-24 RX ADMIN — INSULIN GLARGINE 18 UNIT(S): 100 INJECTION, SOLUTION SUBCUTANEOUS at 21:57

## 2023-11-24 RX ADMIN — QUETIAPINE FUMARATE 100 MILLIGRAM(S): 200 TABLET, FILM COATED ORAL at 21:57

## 2023-11-24 RX ADMIN — Medication 2: at 13:14

## 2023-11-24 RX ADMIN — Medication 8 UNIT(S): at 13:14

## 2023-11-24 NOTE — BH CONSULTATION LIAISON PROGRESS NOTE - NSBHFUPINTERVALHXFT_PSY_A_CORE
This is a 53-y.o. HF patient, Afghan-speaking, with reported history of schizophrenia, diabetes presented due to possible psychiatric complaints/AMS. Patient awake on exam, paying attention enough to say that her name is not Herman, but "Ava". Then she ceases paying attention to the interviewer and pretends to watch TV. Per 1:1 at bedside patient has been irritable, wanders the room and hallway and was seen doing bizarre things, such as licking the ground or pouring water into the heater grill. Patient needed frequent redirection with minimal improvement in behaviors, however, not overtly combative.

## 2023-11-24 NOTE — BH CONSULTATION LIAISON PROGRESS NOTE - NSBHASSESSMENTFT_PSY_ALL_CORE
53F Italian speaking patient with reported history of schizophrenia, diabetes presented due to possible psychiatric complaints/AMS. Today, patient was eating lunch and did not want to speak. Told her that we would come back later today. Social work was also present and despite use of a  the patient did not want to engage. The PCA states that she has been intermittently getting up to walk around.  She also did so at multiple points while in her room today, always without warning. She has not tried to harm herself or others. She slept last night, with intermittent awakening and wandering.

## 2023-11-24 NOTE — BH CONSULTATION LIAISON PROGRESS NOTE - NSBHMSEREMMEM_PSY_A_CORE
What Type Of Note Output Would You Prefer (Optional)?: Standard Output How Severe Is Your Skin Lesion?: mild Has Your Skin Lesion Been Treated?: not been treated Is This A New Presentation, Or A Follow-Up?: Skin Lesion Unable to assess

## 2023-11-24 NOTE — BH CONSULTATION LIAISON PROGRESS NOTE - NSBHCONSULTRECOMMENDOTHER_PSY_A_CORE FT
Adjust Seroquel to 200mg p.o. at bedtime  Haldol 5 mg PO/IM/IV q 6Hrs prn agitation if Qtc < 500  Obtain EKG for Qtc monitoring

## 2023-11-24 NOTE — CHART NOTE - NSCHARTNOTEFT_GEN_A_CORE
Patient is a 53 F with history of schizoprehnia and T2D here for AMS.  f/u diabetes.  Pt declines to be interviewed or examined.   BS mildly high today 229, 207 on glargine 18 units qHS and premeal lispro 6-8-6    Prefer to target BS < 200. Please increase daily glargine to 20 units. .

## 2023-11-24 NOTE — PROGRESS NOTE ADULT - PROBLEM SELECTOR PLAN 2
Per chart review, patient expressed suicidal ideation  - 1:1 observation  - Psych consulted, evaluation ongoing planned for 2PC when endo clears  -Seroquel 100mg PO QHS ordered, Previous EKG from 11/21 reviewed with Qtc 440s.  Ordered repeat

## 2023-11-25 LAB
GLUCOSE BLDC GLUCOMTR-MCNC: 268 MG/DL — HIGH (ref 70–99)
GLUCOSE BLDC GLUCOMTR-MCNC: 268 MG/DL — HIGH (ref 70–99)
GLUCOSE BLDC GLUCOMTR-MCNC: 297 MG/DL — HIGH (ref 70–99)
GLUCOSE BLDC GLUCOMTR-MCNC: 297 MG/DL — HIGH (ref 70–99)

## 2023-11-25 PROCEDURE — 99232 SBSQ HOSP IP/OBS MODERATE 35: CPT

## 2023-11-25 RX ORDER — HALOPERIDOL DECANOATE 100 MG/ML
5 INJECTION INTRAMUSCULAR EVERY 6 HOURS
Refills: 0 | Status: DISCONTINUED | OUTPATIENT
Start: 2023-11-25 | End: 2023-11-25

## 2023-11-25 RX ORDER — HALOPERIDOL DECANOATE 100 MG/ML
5 INJECTION INTRAMUSCULAR EVERY 6 HOURS
Refills: 0 | Status: DISCONTINUED | OUTPATIENT
Start: 2023-11-25 | End: 2023-11-30

## 2023-11-25 RX ORDER — QUETIAPINE FUMARATE 200 MG/1
200 TABLET, FILM COATED ORAL AT BEDTIME
Refills: 0 | Status: DISCONTINUED | OUTPATIENT
Start: 2023-11-25 | End: 2023-11-30

## 2023-11-25 RX ADMIN — Medication 1: at 22:57

## 2023-11-25 RX ADMIN — INSULIN GLARGINE 18 UNIT(S): 100 INJECTION, SOLUTION SUBCUTANEOUS at 22:51

## 2023-11-25 RX ADMIN — HALOPERIDOL DECANOATE 5 MILLIGRAM(S): 100 INJECTION INTRAMUSCULAR at 12:37

## 2023-11-25 RX ADMIN — Medication 3: at 16:38

## 2023-11-25 RX ADMIN — QUETIAPINE FUMARATE 200 MILLIGRAM(S): 200 TABLET, FILM COATED ORAL at 22:47

## 2023-11-25 RX ADMIN — Medication 6 UNIT(S): at 16:39

## 2023-11-25 NOTE — CHART NOTE - NSCHARTNOTEFT_GEN_A_CORE
Age: 53y    Gender: Female    POCT Blood Glucose:  297 mg/dL (11-25-23 @ 16:27)  113 mg/dL (11-24-23 @ 21:56)  138 mg/dL (11-24-23 @ 17:22)      eMAR:  insulin glargine Injectable (LANTUS)   18 Unit(s) SubCutaneous (11-24-23 @ 21:57)    insulin lispro Injectable (ADMELOG)   6 Unit(s) SubCutaneous (11-24-23 @ 17:48)     POC glucose, insulin requirements, lab values reviewed.  noted patient refused ADmelog for breakfast and lunch per emar , FS now 297, last recorded FS at bedtime 113  No evident pattern for changes to insulin regimen, continue to monitor for glycemic trends.     T2DM with hyperglycemia  - Most recent Hemoglobin A1C 13.4  - Home DM med: unclear, patient poor historian   - Current FS ranges from 200-300  - Current diet: CHO  - Please monitor blood glucose values TID AC & QHS while eating regular meals and Q6H while NPO  - Blood glucose goals pre-meal less than 140 mg/dL and random blood glucose less than 180 mg/dL  - Recommendations:  - C/w insulin Glargine 18 units QHS  - C/w insulin Lispro to 6 -8-6 units TID with meals, hold if NPO or if eating less than 50% of meals  - Continue with low dose correctional scale TID with meals  - Continue with low dose correctional scale QHS  - Keep hypoglycemia protocol in place   - Encourage Pt to allow FS Checks TID AC and qHS, encourage compliance with insulin regimen to assess its efficacy Age: 53y    Gender: Female    POCT Blood Glucose:  297 mg/dL (11-25-23 @ 16:27)  113 mg/dL (11-24-23 @ 21:56)  138 mg/dL (11-24-23 @ 17:22)      eMAR:  insulin glargine Injectable (LANTUS)   18 Unit(s) SubCutaneous (11-24-23 @ 21:57)    insulin lispro Injectable (ADMELOG)   6 Unit(s) SubCutaneous (11-24-23 @ 17:48)     POC glucose, insulin requirements, lab values reviewed.  noted patient refused ADmelog for breakfast and lunch per emar , FS now 297, last recorded FS at bedtime 113  No evident pattern for changes to insulin regimen, continue to monitor for glycemic trends.     T2DM with hyperglycemia  - Most recent Hemoglobin A1C 13.4  - Home DM med: unclear, patient poor historian   - Current FS ranges from 113-297  - Current diet: CHO  - Please monitor blood glucose values TID AC & QHS while eating regular meals and Q6H while NPO  - Blood glucose goals pre-meal less than 140 mg/dL and random blood glucose less than 180 mg/dL  - Recommendations:  - C/w insulin Glargine 18 units QHS  - C/w insulin Lispro to 6 -8-6 units TID with meals, hold if NPO or if eating less than 50% of meals  - Continue with low dose correctional scale TID with meals  - Continue with low dose correctional scale QHS  - Keep hypoglycemia protocol in place   - Encourage Pt to allow FS Checks TID AC and qHS, encourage compliance with insulin regimen to assess its efficacy

## 2023-11-25 NOTE — PROGRESS NOTE ADULT - PROBLEM SELECTOR PLAN 2
Glucose of 530 on admission, improved now, not in DKA, A1c 13%  -Endo recs appreciated- Lantus 18units and Lispro 6units before breakfast and dinner and 8 units before lunch w/ ISS  - hyperglycemia at time due to food

## 2023-11-26 LAB
GLUCOSE BLDC GLUCOMTR-MCNC: 203 MG/DL — HIGH (ref 70–99)
GLUCOSE BLDC GLUCOMTR-MCNC: 203 MG/DL — HIGH (ref 70–99)
GLUCOSE BLDC GLUCOMTR-MCNC: 223 MG/DL — HIGH (ref 70–99)
GLUCOSE BLDC GLUCOMTR-MCNC: 223 MG/DL — HIGH (ref 70–99)
GLUCOSE BLDC GLUCOMTR-MCNC: 313 MG/DL — HIGH (ref 70–99)
GLUCOSE BLDC GLUCOMTR-MCNC: 313 MG/DL — HIGH (ref 70–99)

## 2023-11-26 PROCEDURE — 99232 SBSQ HOSP IP/OBS MODERATE 35: CPT

## 2023-11-26 PROCEDURE — 99231 SBSQ HOSP IP/OBS SF/LOW 25: CPT

## 2023-11-26 RX ADMIN — QUETIAPINE FUMARATE 200 MILLIGRAM(S): 200 TABLET, FILM COATED ORAL at 22:25

## 2023-11-26 RX ADMIN — Medication 6 UNIT(S): at 16:10

## 2023-11-26 RX ADMIN — Medication 6 UNIT(S): at 09:49

## 2023-11-26 RX ADMIN — Medication 2: at 09:48

## 2023-11-26 RX ADMIN — Medication 4: at 16:09

## 2023-11-26 RX ADMIN — INSULIN GLARGINE 18 UNIT(S): 100 INJECTION, SOLUTION SUBCUTANEOUS at 22:25

## 2023-11-26 NOTE — DISCHARGE NOTE PROVIDER - ATTENDING DISCHARGE PHYSICAL EXAMINATION:
Vital Signs Last 24 Hrs  T(C): 36.4 (30 Nov 2023 04:41), Max: 36.6 (29 Nov 2023 10:44)  T(F): 97.6 (30 Nov 2023 04:41), Max: 97.9 (29 Nov 2023 10:44)  HR: 90 (30 Nov 2023 04:41) (90 - 96)  BP: 118/82 (30 Nov 2023 04:41) (118/82 - 125/83)  BP(mean): --  RR: 18 (30 Nov 2023 04:41) (18 - 18)  SpO2: 95% (30 Nov 2023 04:41) (95% - 98%)    Parameters below as of 30 Nov 2023 04:41  Patient On (Oxygen Delivery Method): room air    CONSTITUTIONAL: NAD, covering head with blankets  RESPIRATORY: Normal respiratory effort; no accessory muscle usage  ABDOMEN: Nontender to palpation, normoactive bowel sounds, no rebound/guarding  MUSCULOSKELETAL: no clubbing or cyanosis of digits; no joint swelling or tenderness to palpation  PSYCH: A+O to person at least, bizarre affect  SKIN: No rashes; no palpable lesions

## 2023-11-26 NOTE — DISCHARGE NOTE PROVIDER - CARE PROVIDER_API CALL
[facility], - Endocrine  [address] [place], [place], [address]  Phone: [phone]  Fax: (   )    -  Follow Up Time: 2 weeks   [facility], - Endocrine  [address] [place], [place], [address]  Phone: [phone]  Fax: (   )    -  Follow Up Time: 2 weeks   [facility], - Endocrine  [address] [place], [address]  Phone: [phone]  Fax: (   )    -  Follow Up Time: 2 weeks

## 2023-11-26 NOTE — BH CONSULTATION LIAISON PROGRESS NOTE - NSBHFUPINTERVALHXFT_PSY_A_CORE
Pt was seen and attempted to speak with her with  , [phone], on the phone, but pt ran down the hallway refusing to speak.  Per 1:1 at bedside patient has been irritable, wanders the room and hallway and was seen doing bizarre things, such as licking the ground or pouring water into the heater grill. Patient needed frequent redirection with minimal improvement in behaviors, however, not overtly combative.

## 2023-11-26 NOTE — PROGRESS NOTE ADULT - PROBLEM SELECTOR PLAN 2
On admission Glucose of 530, improved now, not in DKA, A1c 13%  -Endo recs appreciated- Lantus 18units and Lispro 6units before breakfast and dinner and 8 units before lunch w/ ISS  - hyperglycemia at time due to dietary indiscretion On admission Glucose of 530, improved now, not in DKA, A1c 13%  - Endo recs appreciated- Lantus 18units and Lispro 6units before breakfast and dinner and 8 units before lunch w/ ISS  - hyperglycemia at time due to dietary indiscretion

## 2023-11-26 NOTE — DISCHARGE NOTE PROVIDER - HOSPITAL COURSE
HPI:  Likely 2/2 to receiving ativan and haldol, patient is completely unresponsive to stimuli other than pain. No phone contact number available. Previous doctor not in hospital currently. Hx obtained via chart review only. Need medication reconciliation.    53F Estonian speaking patient with reported history of schizophrenia, diabetes is presenting due to possible psychiatric complaints/AMS.      Previously, per the patient, she stated her cousin called the ambulance on her today because she was causing a "domestic issue".  She stated that she "thinks her family called the ambulance because she talks too much". Reportedly she has not been taking her medications, however the patient did not know what medicine she is supposed to be on.  When asked if she wants to kill herself she says yes.  She does not elaborate on why or how she would do it.  She does admit to hearing voices she states that she hears people making noises and crying and making a mess.  She lives in a shelter.  Denies any recreational drug use.  No other complaints.   (21 Nov 2023 04:56)    Hospital Course:    Suicidal ideation.   ·  Plan: Patient expressed suicidal ideation  - 1:1 observation  - Psych consulted, evaluation ongoing planned for 2PC when endo clears  - c/w Seroquel 100mg PO QHS, EKG Qtc 440s.     Problem/Plan - 2:  ·  Problem: Acute hyperglycemia.   ·  Plan: On admission Glucose of 530, improved now, not in DKA, A1c 13%  - Endo recs appreciated- Lantus 18units and Lispro 6units before breakfast and dinner and 8 units before lunch w/ ISS  - hyperglycemia at time due to dietary indiscretion.  Important Medication Changes and Reason:    Active or Pending Issues Requiring Follow-up:  endocrine  Advanced Directives:   [ ] Full code  [ ] DNR  [ ] Hospice    Discharge Diagnoses: AMS         HPI:  Likely 2/2 to receiving ativan and haldol, patient is completely unresponsive to stimuli other than pain. No phone contact number available. Previous doctor not in hospital currently. Hx obtained via chart review only. Need medication reconciliation.    53F Maori speaking patient with reported history of schizophrenia, diabetes is presenting due to possible psychiatric complaints/AMS.      Previously, per the patient, she stated her cousin called the ambulance on her today because she was causing a "domestic issue".  She stated that she "thinks her family called the ambulance because she talks too much". Reportedly she has not been taking her medications, however the patient did not know what medicine she is supposed to be on.  When asked if she wants to kill herself she says yes.  She does not elaborate on why or how she would do it.  She does admit to hearing voices she states that she hears people making noises and crying and making a mess.  She lives in a shelter.  Denies any recreational drug use.  No other complaints.   (21 Nov 2023 04:56)    Hospital Course:    Suicidal ideation.   ·  Plan: Patient expressed suicidal ideation  - 1:1 observation  - Psych consulted, evaluation ongoing planned for 2PC when endo clears  - c/w Seroquel 100mg PO QHS, EKG Qtc 440s.     Problem/Plan - 2:  ·  Problem: Acute hyperglycemia.   ·  Plan: On admission Glucose of 530, improved now, not in DKA, A1c 13%  - Endo recs appreciated- Lantus 18units and Lispro 6units before breakfast and dinner and 8 units before lunch w/ ISS  - hyperglycemia at time due to dietary indiscretion.  Important Medication Changes and Reason:    Active or Pending Issues Requiring Follow-up:  endocrine  Advanced Directives:   [ ] Full code  [ ] DNR  [ ] Hospice    Discharge Diagnoses: AMS         HPI:  Likely 2/2 to receiving ativan and haldol, patient is completely unresponsive to stimuli other than pain. No phone contact number available. Previous doctor not in hospital currently. Hx obtained via chart review only. Need medication reconciliation.    53F Slovak speaking patient with reported history of schizophrenia, diabetes is presenting due to possible psychiatric complaints/AMS.      Previously, per the patient, she stated her cousin called the ambulance on her today because she was causing a "domestic issue".  She stated that she "thinks her family called the ambulance because she talks too much". Reportedly she has not been taking her medications, however the patient did not know what medicine she is supposed to be on.  When asked if she wants to kill herself she says yes.  She does not elaborate on why or how she would do it.  She does admit to hearing voices she states that she hears people making noises and crying and making a mess.  She lives in a shelter.  Denies any recreational drug use.  No other complaints.   (21 Nov 2023 04:56)    Hospital Course:    Suicidal ideation.   ·  Plan: Patient expressed suicidal ideation  - 1:1 observation  - Psych consulted, evaluation ongoing planned for 2PC when endo clears  - c/w Seroquel 100mg PO QHS, EKG Qtc 440s.     Problem/Plan - 2:  ·  Problem: Acute hyperglycemia.   ·  Plan: On admission Glucose of 530, improved now, not in DKA, A1c 13%  - Endo recs appreciated- Lantus 18units and Lispro 6units before breakfast and dinner and 8 units before lunch w/ ISS  - hyperglycemia at time due to dietary indiscretion.  Important Medication Changes and Reason:    Active or Pending Issues Requiring Follow-up:  endocrine  Advanced Directives:   [ ] Full code  [ ] DNR  [ ] Hospice    Discharge Diagnoses: AMS         HPI:  Likely 2/2 to receiving ativan and haldol, patient is completely unresponsive to stimuli other than pain. No phone contact number available. Previous doctor not in hospital currently. Hx obtained via chart review only. Need medication reconciliation.    53F Uzbek speaking patient with reported history of schizophrenia, diabetes is presenting due to possible psychiatric complaints/AMS.      Previously, per the patient, she stated her cousin called the ambulance on her today because she was causing a "domestic issue".  She stated that she "thinks her family called the ambulance because she talks too much". Reportedly she has not been taking her medications, however the patient did not know what medicine she is supposed to be on.  When asked if she wants to kill herself she says yes.  She does not elaborate on why or how she would do it.  She does admit to hearing voices she states that she hears people making noises and crying and making a mess.  She lives in a shelter.  Denies any recreational drug use.  No other complaints.   (21 Nov 2023 04:56)    Hospital Course:    Suicidal ideation.   Patient expressed suicidal ideation  - 1:1 observation  - Psych consulted, evaluation ongoing planned for 2PC   c/w Seroquel 200mg PO QHS, EKG Qtc 440s.    Acute hyperglycemia. On admission Glucose of 530, improved now, not in DKA, A1c 13%  - Endo recs appreciated- Lantus 18units and Lispro 6units before breakfast and dinner and 8 units before lunch w/ ISS  - hyperglycemia at time due to dietary indiscretion.  Important Medication Changes and Reason:    Active or Pending Issues Requiring Follow-up:  endocrine  Advanced Directives:   [ X] Full code  [ ] DNR  [ ] Hospice    Discharge Diagnoses: AMS         HPI:  Likely 2/2 to receiving ativan and haldol, patient is completely unresponsive to stimuli other than pain. No phone contact number available. Previous doctor not in hospital currently. Hx obtained via chart review only. Need medication reconciliation.    53F Maltese speaking patient with reported history of schizophrenia, diabetes is presenting due to possible psychiatric complaints/AMS.      Previously, per the patient, she stated her cousin called the ambulance on her today because she was causing a "domestic issue".  She stated that she "thinks her family called the ambulance because she talks too much". Reportedly she has not been taking her medications, however the patient did not know what medicine she is supposed to be on.  When asked if she wants to kill herself she says yes.  She does not elaborate on why or how she would do it.  She does admit to hearing voices she states that she hears people making noises and crying and making a mess.  She lives in a shelter.  Denies any recreational drug use.  No other complaints.   (21 Nov 2023 04:56)    Hospital Course:    Suicidal ideation.   Patient expressed suicidal ideation  - 1:1 observation  - Psych consulted, evaluation ongoing planned for 2PC   c/w Seroquel 200mg PO QHS, EKG Qtc 440s.    Acute hyperglycemia. On admission Glucose of 530, improved now, not in DKA, A1c 13%  - Endo recs appreciated- Lantus 18units and Lispro 6units before breakfast and dinner and 8 units before lunch w/ ISS  - hyperglycemia at time due to dietary indiscretion.  Important Medication Changes and Reason:    Active or Pending Issues Requiring Follow-up:  endocrine  Advanced Directives:   [ X] Full code  [ ] DNR  [ ] Hospice    Discharge Diagnoses: AMS         HPI:  Likely 2/2 to receiving ativan and haldol, patient is completely unresponsive to stimuli other than pain. No phone contact number available. Previous doctor not in hospital currently. Hx obtained via chart review only. Need medication reconciliation.    53F Azeri speaking patient with reported history of schizophrenia, diabetes is presenting due to possible psychiatric complaints/AMS.      Previously, per the patient, she stated her cousin called the ambulance on her today because she was causing a "domestic issue".  She stated that she "thinks her family called the ambulance because she talks too much". Reportedly she has not been taking her medications, however the patient did not know what medicine she is supposed to be on.  When asked if she wants to kill herself she says yes.  She does not elaborate on why or how she would do it.  She does admit to hearing voices she states that she hears people making noises and crying and making a mess.  She lives in a shelter.  Denies any recreational drug use.  No other complaints.   (21 Nov 2023 04:56)    Hospital Course:    Suicidal ideation.   Patient expressed suicidal ideation  - 1:1 observation  - Psych consulted, evaluation ongoing planned for 2PC   c/w Seroquel 200mg PO QHS, EKG Qtc 440s.    Acute hyperglycemia. On admission Glucose of 530, improved now, not in DKA, A1c 13%  - Endo recs appreciated- Lantus 18units and Lispro 6units before breakfast and dinner and 8 units before lunch w/ ISS  - hyperglycemia at time due to dietary indiscretion.  Important Medication Changes and Reason:    Active or Pending Issues Requiring Follow-up:  endocrine  Advanced Directives:   [ X] Full code  [ ] DNR  [ ] Hospice    Discharge Diagnoses: AMS         HPI:  Likely 2/2 to receiving ativan and haldol, patient is completely unresponsive to stimuli other than pain. No phone contact number available. Previous doctor not in hospital currently. Hx obtained via chart review only. Need medication reconciliation.    53F Mohawk speaking patient with reported history of schizophrenia, diabetes is presenting due to possible psychiatric complaints/AMS.      Previously, per the patient, she stated her cousin called the ambulance on her today because she was causing a "domestic issue".  She stated that she "thinks her family called the ambulance because she talks too much". Reportedly she has not been taking her medications, however the patient did not know what medicine she is supposed to be on.  When asked if she wants to kill herself she says yes.  She does not elaborate on why or how she would do it.  She does admit to hearing voices she states that she hears people making noises and crying and making a mess.  She lives in a shelter.  Denies any recreational drug use.  No other complaints.   (21 Nov 2023 04:56)    Hospital Course:    Suicidal ideation.   Patient expressed suicidal ideation  - 1:1 observation  - Psych consulted, evaluation ongoing planned for 2PC   c/w Seroquel 200mg PO QHS, EKG Qtc 440s.    Acute hyperglycemia. On admission Glucose of 530, improved now, not in DKA, A1c 13%  - Endo recs appreciated- Lantus 18units and Lispro 6units before breakfast and dinner and 10 units before lunch and dinner w/ ISS  - hyperglycemia at time due to dietary indiscretion.    Important Medication Changes and Reason:    Active or Pending Issues Requiring Follow-up:  endocrine  Advanced Directives:   [ X] Full code  [ ] DNR  [ ] Hospice    Discharge Diagnoses: hyperglycemia, suicidal ideation          HPI:  Likely 2/2 to receiving ativan and haldol, patient is completely unresponsive to stimuli other than pain. No phone contact number available. Previous doctor not in hospital currently. Hx obtained via chart review only. Need medication reconciliation.    53F Yakut speaking patient with reported history of schizophrenia, diabetes is presenting due to possible psychiatric complaints/AMS.      Previously, per the patient, she stated her cousin called the ambulance on her today because she was causing a "domestic issue".  She stated that she "thinks her family called the ambulance because she talks too much". Reportedly she has not been taking her medications, however the patient did not know what medicine she is supposed to be on.  When asked if she wants to kill herself she says yes.  She does not elaborate on why or how she would do it.  She does admit to hearing voices she states that she hears people making noises and crying and making a mess.  She lives in a shelter.  Denies any recreational drug use.  No other complaints.   (21 Nov 2023 04:56)    Hospital Course:    Suicidal ideation.   Patient expressed suicidal ideation  - 1:1 observation  - Psych consulted, evaluation ongoing planned for 2PC   c/w Seroquel 200mg PO QHS, EKG Qtc 440s.    Acute hyperglycemia. On admission Glucose of 530, improved now, not in DKA, A1c 13%  - Endo recs appreciated- Lantus 18units and Lispro 6units before breakfast and dinner and 10 units before lunch and dinner w/ ISS  - hyperglycemia at time due to dietary indiscretion.    Important Medication Changes and Reason:    Active or Pending Issues Requiring Follow-up:  endocrine  Advanced Directives:   [ X] Full code  [ ] DNR  [ ] Hospice    Discharge Diagnoses: hyperglycemia, suicidal ideation          HPI:  Likely 2/2 to receiving ativan and haldol, patient is completely unresponsive to stimuli other than pain. No phone contact number available. Previous doctor not in hospital currently. Hx obtained via chart review only. Need medication reconciliation.    53F Turkmen speaking patient with reported history of schizophrenia, diabetes is presenting due to possible psychiatric complaints/AMS.      Previously, per the patient, she stated her cousin called the ambulance on her today because she was causing a "domestic issue".  She stated that she "thinks her family called the ambulance because she talks too much". Reportedly she has not been taking her medications, however the patient did not know what medicine she is supposed to be on.  When asked if she wants to kill herself she says yes.  She does not elaborate on why or how she would do it.  She does admit to hearing voices she states that she hears people making noises and crying and making a mess.  She lives in a shelter.  Denies any recreational drug use.  No other complaints.   (21 Nov 2023 04:56)    Hospital Course:    Suicidal ideation.   Patient expressed suicidal ideation  - 1:1 observation  - Psych consulted, evaluation ongoing planned for 2PC   c/w Seroquel 200mg PO QHS, EKG Qtc 440s.    Acute hyperglycemia. On admission Glucose of 530, improved now, not in DKA, A1c 13%  - Endo recs appreciated- Lantus 18units and Lispro 6units before breakfast and dinner and 10 units before lunch and dinner w/ ISS  - hyperglycemia at time due to dietary indiscretion.    Important Medication Changes and Reason:    Active or Pending Issues Requiring Follow-up:  endocrine  Advanced Directives:   [ X] Full code  [ ] DNR  [ ] Hospice    Discharge Diagnoses: hyperglycemia, suicidal ideation

## 2023-11-26 NOTE — BH CONSULTATION LIAISON PROGRESS NOTE - NSBHASSESSMENTFT_PSY_ALL_CORE
53F Khmer speaking patient with reported history of schizophrenia, diabetes presented due to possible psychiatric complaints/AMS. Today, patient was eating lunch and did not want to speak. Told her that we would come back later today. Social work was also present and despite use of a  the patient did not want to engage. The PCA states that she has been intermittently getting up to walk around.  She also did so at multiple points while in her room today, always without warning. She has not tried to harm herself or others. She slept last night, with intermittent awakening and wandering.

## 2023-11-26 NOTE — DISCHARGE NOTE PROVIDER - NSDCFUADDAPPT_GEN_ALL_CORE_FT
APPTS ARE READY TO BE MADE: [ ] YES    Best Family or Patient Contact (if needed):    Additional Information about above appointments (if needed):    1: Endocrinology  2:   3:     Other comments or requests:

## 2023-11-26 NOTE — DISCHARGE NOTE PROVIDER - PROVIDER TOKENS
FREE:[LAST:[[facility]],FIRST:[- Endocrine],PHONE:[phone]],FAX:[(   )    -],ADDRESS:[[address] [place], [place], [address]],FOLLOWUP:[2 weeks]] FREE:[LAST:[[facility]],FIRST:[- Endocrine],PHONE:[phone]],FAX:[(   )    -],ADDRESS:[[address] [place], [place], [address]],FOLLOWUP:[2 weeks]] FREE:[LAST:[[facility]],FIRST:[- Endocrine],PHONE:[phone]],FAX:[(   )    -],ADDRESS:[[address] [place], [address]],FOLLOWUP:[2 weeks]]

## 2023-11-26 NOTE — DISCHARGE NOTE PROVIDER - NSDCCPCAREPLAN_GEN_ALL_CORE_FT
PRINCIPAL DISCHARGE DIAGNOSIS  Diagnosis: Hyperglycemia  Assessment and Plan of Treatment: consistent carb diet  monitor finger sticks  medication to be determined by endocrine   Encourage Pt to allow FS Checks TID AC and qHS, encourage compliance with insulin regimen to assess its efficacy.      SECONDARY DISCHARGE DIAGNOSES  Diagnosis: Poorly controlled diabetes mellitus  Assessment and Plan of Treatment: HgA1C this admission is 13.4  Make sure you get your HgA1c checked every three months.  If you take oral diabetes medications, check your blood glucose two times a day.  If you take insulin, check your blood glucose before meals and at bedtime.  It's important not to skip any meals.  Keep a log of your blood glucose results and always take it with you to your doctor appointments.  Keep a list of your current medications including injectables and over the counter medications and bring this medication list with you to all your doctor appointments.  If you have not seen your ophthalmologist this year call for appointment.  Check your feet daily for redness, sores, or openings. Do not self treat. If no improvement in two days call your primary care physician for an appointment.  Low blood sugar (hypoglycemia) is a blood sugar below 70mg/dl. Check your blood sugar if you feel signs/symptoms of hypoglycemia. If your blood sugar is below 70 take 15 grams of carbohydrates (ex 4 oz of apple juice, 3-4 glucose tablets, or 4-6 oz of regular soda) wait 15 minutes and repeat blood sugar to make sure it comes up above 70.  If your blood sugar is above 70 and you are due for a meal, have a meal.  If you are not due for a meal have a snack.  This snack helps keeps your blood sugar at a safe range.      Diagnosis: Schizophrenia  Assessment and Plan of Treatment: 53F Maldivian speaking patient with reported history of schizophrenia, diabetes presented due to possible psychiatric complaints/AMS. Today, patient was eating lunch and did not want to speak. Told her that we would come back later today. Social work was also present and despite use of a  the patient did not want to engage. The PCA states that she has been intermittently getting up to walk around.  She also did so at multiple points while in her room today, always without warning. She has not tried to harm herself or others. She slept last night, with intermittent awakening and wandering.  Constant observation       PRINCIPAL DISCHARGE DIAGNOSIS  Diagnosis: Hyperglycemia  Assessment and Plan of Treatment: consistent carb diet  monitor finger sticks  medication to be determined by endocrine   Encourage Pt to allow FS Checks TID AC and qHS, encourage compliance with insulin regimen to assess its efficacy.      SECONDARY DISCHARGE DIAGNOSES  Diagnosis: Poorly controlled diabetes mellitus  Assessment and Plan of Treatment: HgA1C this admission is 13.4  Make sure you get your HgA1c checked every three months.  If you take oral diabetes medications, check your blood glucose two times a day.  If you take insulin, check your blood glucose before meals and at bedtime.  It's important not to skip any meals.  Keep a log of your blood glucose results and always take it with you to your doctor appointments.  Keep a list of your current medications including injectables and over the counter medications and bring this medication list with you to all your doctor appointments.  If you have not seen your ophthalmologist this year call for appointment.  Check your feet daily for redness, sores, or openings. Do not self treat. If no improvement in two days call your primary care physician for an appointment.  Low blood sugar (hypoglycemia) is a blood sugar below 70mg/dl. Check your blood sugar if you feel signs/symptoms of hypoglycemia. If your blood sugar is below 70 take 15 grams of carbohydrates (ex 4 oz of apple juice, 3-4 glucose tablets, or 4-6 oz of regular soda) wait 15 minutes and repeat blood sugar to make sure it comes up above 70.  If your blood sugar is above 70 and you are due for a meal, have a meal.  If you are not due for a meal have a snack.  This snack helps keeps your blood sugar at a safe range.      Diagnosis: Schizophrenia  Assessment and Plan of Treatment: 53F Hong Konger speaking patient with reported history of schizophrenia, diabetes presented due to possible psychiatric complaints/AMS. Today, patient was eating lunch and did not want to speak. Told her that we would come back later today. Social work was also present and despite use of a  the patient did not want to engage. The PCA states that she has been intermittently getting up to walk around.  She also did so at multiple points while in her room today, always without warning. She has not tried to harm herself or others. She slept last night, with intermittent awakening and wandering.  Constant observation       PRINCIPAL DISCHARGE DIAGNOSIS  Diagnosis: Hyperglycemia  Assessment and Plan of Treatment: consistent carb diet  monitor finger sticks  medication to be determined by endocrine   Encourage Pt to allow FS Checks TID AC and qHS, encourage compliance with insulin regimen to assess its efficacy.      SECONDARY DISCHARGE DIAGNOSES  Diagnosis: Poorly controlled diabetes mellitus  Assessment and Plan of Treatment: HgA1C this admission is 13.4  Make sure you get your HgA1c checked every three months.  If you take oral diabetes medications, check your blood glucose two times a day.  If you take insulin, check your blood glucose before meals and at bedtime.  It's important not to skip any meals.  Keep a log of your blood glucose results and always take it with you to your doctor appointments.  Keep a list of your current medications including injectables and over the counter medications and bring this medication list with you to all your doctor appointments.  If you have not seen your ophthalmologist this year call for appointment.  Check your feet daily for redness, sores, or openings. Do not self treat. If no improvement in two days call your primary care physician for an appointment.  Low blood sugar (hypoglycemia) is a blood sugar below 70mg/dl. Check your blood sugar if you feel signs/symptoms of hypoglycemia. If your blood sugar is below 70 take 15 grams of carbohydrates (ex 4 oz of apple juice, 3-4 glucose tablets, or 4-6 oz of regular soda) wait 15 minutes and repeat blood sugar to make sure it comes up above 70.  If your blood sugar is above 70 and you are due for a meal, have a meal.  If you are not due for a meal have a snack.  This snack helps keeps your blood sugar at a safe range.      Diagnosis: Schizophrenia  Assessment and Plan of Treatment: 53F [place]isian speaking patient with reported history of schizophrenia, diabetes presented due to possible psychiatric complaints/AMS. Today, patient was eating lunch and did not want to speak. Told her that we would come back later today. Social work was also present and despite use of a  the patient did not want to engage. The PCA states that she has been intermittently getting up to walk around.  She also did so at multiple points while in her room today, always without warning. She has not tried to harm herself or others. She slept last night, with intermittent awakening and wandering.  Constant observation       PRINCIPAL DISCHARGE DIAGNOSIS  Diagnosis: Schizophrenia  Assessment and Plan of Treatment: You had thoughts of harming yourself. You will be discharged to an inpatient psychiatric facility for further care.         SECONDARY DISCHARGE DIAGNOSES  Diagnosis: Poorly controlled diabetes mellitus  Assessment and Plan of Treatment: You have uncontrolled diabetes. Continue insulin as prescribed. Follow-up with endocrinology. You will need outpatient follow-up with podiatry and ophthalmology. You should also start a statin to help reduce your risk of heart disease.

## 2023-11-26 NOTE — DISCHARGE NOTE PROVIDER - NSDCMRMEDTOKEN_GEN_ALL_CORE_FT
insulin glargine 100 units/mL subcutaneous solution: 18 unit(s) subcutaneous once a day (at bedtime)  insulin lispro 100 units/mL injectable solution: subcutaneous 3 times a day (before meals) 1 Unit(s) if Glucose 151 - 200  2 Unit(s) if Glucose 201 - 250  3 Unit(s) if Glucose 251 - 300  4 Unit(s) if Glucose 301 - 350  5 Unit(s) if Glucose 351 - 400  6 Unit(s) if Glucose Greater Than 400  insulin lispro 100 units/mL injectable solution: subcutaneous once a day (at bedtime) 0 Unit(s) if Glucose 0 - 250  1 Unit(s) if Glucose 251 - 300  2 Unit(s) if Glucose 301 - 350  3 Unit(s) if Glucose 351 - 400  4 Unit(s) if Glucose Greater Than 400  insulin lispro 100 units/mL injectable solution: 6 unit(s) subcutaneous once a day before breakfast  insulin lispro 100 units/mL injectable solution: 10 unit(s) subcutaneous once a day before lunch  insulin lispro 100 units/mL injectable solution: 10 international unit(s) subcutaneous once a day before dinner  QUEtiapine 200 mg oral tablet: 1 tab(s) orally once a day (at bedtime)

## 2023-11-26 NOTE — BH CONSULTATION LIAISON PROGRESS NOTE - ADDITIONAL PSYCHIATRIC MEDICATIONS
Psychiatric medications are not known because patient will not talk with the team
Psychiatric medications are not known because patient will not talk with team
Psychiatric medications are not known because patient will not talk with the team
Psychiatric medications are not known because patient will not talk with team

## 2023-11-26 NOTE — PROGRESS NOTE ADULT - PROBLEM SELECTOR PLAN 3
Delusional at time, suspect metabolic from hyperglycemia too.   - Diabetic diet  - Refusing blood work, [facility] Delusional at time, suspect metabolic from hyperglycemia too.   - Vit B12, Folate, TSH, UA are unremarkable  - Diabetic diet  - Refusing blood work, [facility]

## 2023-11-27 DIAGNOSIS — E11.65 TYPE 2 DIABETES MELLITUS WITH HYPERGLYCEMIA: ICD-10-CM

## 2023-11-27 LAB
GLUCOSE BLDC GLUCOMTR-MCNC: 139 MG/DL — HIGH (ref 70–99)
GLUCOSE BLDC GLUCOMTR-MCNC: 139 MG/DL — HIGH (ref 70–99)
GLUCOSE BLDC GLUCOMTR-MCNC: 162 MG/DL — HIGH (ref 70–99)
GLUCOSE BLDC GLUCOMTR-MCNC: 162 MG/DL — HIGH (ref 70–99)
GLUCOSE BLDC GLUCOMTR-MCNC: 402 MG/DL — HIGH (ref 70–99)
GLUCOSE BLDC GLUCOMTR-MCNC: 402 MG/DL — HIGH (ref 70–99)
GLUCOSE BLDC GLUCOMTR-MCNC: 432 MG/DL — HIGH (ref 70–99)
GLUCOSE BLDC GLUCOMTR-MCNC: 432 MG/DL — HIGH (ref 70–99)
SARS-COV-2 RNA SPEC QL NAA+PROBE: SIGNIFICANT CHANGE UP
SARS-COV-2 RNA SPEC QL NAA+PROBE: SIGNIFICANT CHANGE UP

## 2023-11-27 PROCEDURE — 99231 SBSQ HOSP IP/OBS SF/LOW 25: CPT

## 2023-11-27 PROCEDURE — 99233 SBSQ HOSP IP/OBS HIGH 50: CPT

## 2023-11-27 PROCEDURE — 99232 SBSQ HOSP IP/OBS MODERATE 35: CPT

## 2023-11-27 PROCEDURE — 93010 ELECTROCARDIOGRAM REPORT: CPT

## 2023-11-27 RX ORDER — INSULIN LISPRO 100/ML
8 VIAL (ML) SUBCUTANEOUS
Refills: 0 | Status: DISCONTINUED | OUTPATIENT
Start: 2023-11-27 | End: 2023-11-28

## 2023-11-27 RX ADMIN — INSULIN GLARGINE 18 UNIT(S): 100 INJECTION, SOLUTION SUBCUTANEOUS at 21:53

## 2023-11-27 RX ADMIN — Medication 6 UNIT(S): at 09:20

## 2023-11-27 RX ADMIN — Medication 1: at 09:20

## 2023-11-27 RX ADMIN — Medication 650 MILLIGRAM(S): at 00:26

## 2023-11-27 RX ADMIN — Medication 6: at 18:11

## 2023-11-27 RX ADMIN — HALOPERIDOL DECANOATE 5 MILLIGRAM(S): 100 INJECTION INTRAMUSCULAR at 01:23

## 2023-11-27 RX ADMIN — QUETIAPINE FUMARATE 200 MILLIGRAM(S): 200 TABLET, FILM COATED ORAL at 21:18

## 2023-11-27 RX ADMIN — Medication 8 UNIT(S): at 18:11

## 2023-11-27 RX ADMIN — Medication 650 MILLIGRAM(S): at 01:35

## 2023-11-27 NOTE — PROGRESS NOTE ADULT - PROBLEM SELECTOR PLAN 3
Delusional at time, suspect metabolic from hyperglycemia too.   - Vit B12, Folate, TSH, UA are unremarkable  - Diabetic diet  - Refusing blood work, [facility]

## 2023-11-27 NOTE — PROGRESS NOTE ADULT - PROBLEM SELECTOR PLAN 2
On admission Glucose of 530, improved now, not in DKA, A1c 13%  - Endo recs appreciated- Lantus 18units and Lispro 6units before breakfast and dinner and 8 units before lunch w/ ISS  - hyperglycemia due to dietary indiscretion - controlled now

## 2023-11-27 NOTE — BH CONSULTATION LIAISON PROGRESS NOTE - NSBHASSESSMENTFT_PSY_ALL_CORE
53F English speaking patient with reported history of schizophrenia, diabetes presented due to possible psychiatric complaints/AMS. Today, patient was eating lunch and did not want to speak. Told her that we would come back later today. Social work was also present and despite use of a  the patient did not want to engage. The PCA states that she has been intermittently getting up to walk around.  She also did so at multiple points while in her room today, always without warning. She has not tried to harm herself or others. She slept last night, with intermittent awakening and wandering.

## 2023-11-27 NOTE — BH CONSULTATION LIAISON PROGRESS NOTE - NSBHFUPINTERVALHXFT_PSY_A_CORE
Pt was seen and attempted to speak with her with  , staff at bedside. Pt poor historian, guarded, not answreing some questions. pt denies si/hi, hallucinations. pt feels unsafe in the hospital, stating people holding her back from walking around. pt unable to explain details why she came to the hospital, stating "a prostitute called the ambulance". no prns given.

## 2023-11-28 DIAGNOSIS — E11.65 TYPE 2 DIABETES MELLITUS WITH HYPERGLYCEMIA: ICD-10-CM

## 2023-11-28 LAB
GLUCOSE BLDC GLUCOMTR-MCNC: 123 MG/DL — HIGH (ref 70–99)
GLUCOSE BLDC GLUCOMTR-MCNC: 123 MG/DL — HIGH (ref 70–99)
GLUCOSE BLDC GLUCOMTR-MCNC: 166 MG/DL — HIGH (ref 70–99)
GLUCOSE BLDC GLUCOMTR-MCNC: 166 MG/DL — HIGH (ref 70–99)
GLUCOSE BLDC GLUCOMTR-MCNC: 167 MG/DL — HIGH (ref 70–99)
GLUCOSE BLDC GLUCOMTR-MCNC: 167 MG/DL — HIGH (ref 70–99)
GLUCOSE BLDC GLUCOMTR-MCNC: 207 MG/DL — HIGH (ref 70–99)
GLUCOSE BLDC GLUCOMTR-MCNC: 207 MG/DL — HIGH (ref 70–99)

## 2023-11-28 PROCEDURE — 99232 SBSQ HOSP IP/OBS MODERATE 35: CPT

## 2023-11-28 RX ORDER — INSULIN LISPRO 100/ML
10 VIAL (ML) SUBCUTANEOUS
Refills: 0 | Status: DISCONTINUED | OUTPATIENT
Start: 2023-11-29 | End: 2023-11-30

## 2023-11-28 RX ADMIN — Medication 6 UNIT(S): at 10:46

## 2023-11-28 RX ADMIN — INSULIN GLARGINE 18 UNIT(S): 100 INJECTION, SOLUTION SUBCUTANEOUS at 21:36

## 2023-11-28 RX ADMIN — Medication 8 UNIT(S): at 17:36

## 2023-11-28 RX ADMIN — Medication 650 MILLIGRAM(S): at 11:58

## 2023-11-28 RX ADMIN — Medication 2: at 17:36

## 2023-11-28 RX ADMIN — QUETIAPINE FUMARATE 200 MILLIGRAM(S): 200 TABLET, FILM COATED ORAL at 21:36

## 2023-11-28 RX ADMIN — Medication 8 UNIT(S): at 13:33

## 2023-11-28 RX ADMIN — Medication 1: at 10:45

## 2023-11-28 NOTE — PROGRESS NOTE ADULT - NUTRITIONAL ASSESSMENT
Diet, Consistent Carbohydrate w/Evening Snack (11-21-23 @ 16:49) [Active]
Diet, Consistent Carbohydrate w/Evening Snack (11-21-23 @ 16:49) [Active]      Needs RD consult

## 2023-11-28 NOTE — PROGRESS NOTE ADULT - PROBLEM SELECTOR PLAN 3
LDL goal <70 due to DM  Pt LDL NA  Order fasting lipid if not recently done  Consider moderate intensity statin if not contraindicated due to high risk for CV events.   Manage per primary team   F/u levels as out pt

## 2023-11-28 NOTE — PROGRESS NOTE ADULT - NSPROGADDITIONALINFOA_GEN_ALL_CORE
-Plan discussed with pt/team.  Contact info: [phone] (24/7). pager [phone]  Amion on [employer]  Teams on M-T-W-F. Unavailable Thu/Weekends/Holidays  Assessed pt/labs/meds and discussed plan of care with primary team  Adjusting insulin  Discharge plan  Follow up care
2PC to inpt psych when glucose controlled/endo clears

## 2023-11-29 LAB
GLUCOSE BLDC GLUCOMTR-MCNC: 142 MG/DL — HIGH (ref 70–99)
GLUCOSE BLDC GLUCOMTR-MCNC: 142 MG/DL — HIGH (ref 70–99)
GLUCOSE BLDC GLUCOMTR-MCNC: 193 MG/DL — HIGH (ref 70–99)
GLUCOSE BLDC GLUCOMTR-MCNC: 193 MG/DL — HIGH (ref 70–99)
GLUCOSE BLDC GLUCOMTR-MCNC: 221 MG/DL — HIGH (ref 70–99)
GLUCOSE BLDC GLUCOMTR-MCNC: 221 MG/DL — HIGH (ref 70–99)
GLUCOSE BLDC GLUCOMTR-MCNC: 90 MG/DL — SIGNIFICANT CHANGE UP (ref 70–99)
GLUCOSE BLDC GLUCOMTR-MCNC: 90 MG/DL — SIGNIFICANT CHANGE UP (ref 70–99)
SARS-COV-2 RNA SPEC QL NAA+PROBE: SIGNIFICANT CHANGE UP
SARS-COV-2 RNA SPEC QL NAA+PROBE: SIGNIFICANT CHANGE UP

## 2023-11-29 PROCEDURE — 99232 SBSQ HOSP IP/OBS MODERATE 35: CPT

## 2023-11-29 RX ORDER — INSULIN LISPRO 100/ML
10 VIAL (ML) SUBCUTANEOUS
Qty: 0 | Refills: 0 | DISCHARGE
Start: 2023-11-29

## 2023-11-29 RX ORDER — INSULIN LISPRO 100/ML
0 VIAL (ML) SUBCUTANEOUS
Qty: 0 | Refills: 0 | DISCHARGE
Start: 2023-11-29

## 2023-11-29 RX ORDER — QUETIAPINE FUMARATE 200 MG/1
1 TABLET, FILM COATED ORAL
Qty: 0 | Refills: 0 | DISCHARGE
Start: 2023-11-29

## 2023-11-29 RX ORDER — INSULIN GLARGINE 100 [IU]/ML
18 INJECTION, SOLUTION SUBCUTANEOUS
Qty: 0 | Refills: 0 | DISCHARGE
Start: 2023-11-29

## 2023-11-29 RX ORDER — INSULIN LISPRO 100/ML
6 VIAL (ML) SUBCUTANEOUS
Qty: 0 | Refills: 0 | DISCHARGE
Start: 2023-11-29

## 2023-11-29 RX ADMIN — QUETIAPINE FUMARATE 200 MILLIGRAM(S): 200 TABLET, FILM COATED ORAL at 22:05

## 2023-11-29 RX ADMIN — Medication 10 UNIT(S): at 18:42

## 2023-11-29 RX ADMIN — HALOPERIDOL DECANOATE 5 MILLIGRAM(S): 100 INJECTION INTRAMUSCULAR at 10:29

## 2023-11-29 RX ADMIN — Medication 2: at 18:42

## 2023-11-29 RX ADMIN — INSULIN GLARGINE 18 UNIT(S): 100 INJECTION, SOLUTION SUBCUTANEOUS at 22:04

## 2023-11-29 RX ADMIN — Medication 6 UNIT(S): at 10:13

## 2023-11-29 NOTE — PROGRESS NOTE ADULT - SUBJECTIVE AND OBJECTIVE BOX
SUBJECTIVE / OVERNIGHT EVENTS:  Today is hospital day 6d. There are no new issues or overnight events.     HPI:  Likely 2/2 to receiving ativan and haldol, patient is completely unresponsive to stimuli other than pain. No phone contact number available. Previous doctor not in hospital currently. Hx obtained via chart review only. Need medication reconciliation.    53F Israeli speaking patient with reported history of schizophrenia, diabetes is presenting due to possible psychiatric complaints/AMS.      Previously, per the patient, she stated her cousin called the ambulance on her today because she was causing a "domestic issue".  She stated that she "thinks her family called the ambulance because she talks too much". Reportedly she has not been taking her medications, however the patient did not know what medicine she is supposed to be on.  When asked if she wants to kill herself she says yes.  She does not elaborate on why or how she would do it.  She does admit to hearing voices she states that she hears people making noises and crying and making a mess.  She lives in a shelter.  Denies any recreational drug use.  No other complaints.   (21 Nov 2023 04:56)    MEDICATIONS  (STANDING):  dextrose 5%. 1000 milliLiter(s) (100 mL/Hr) IV Continuous <Continuous>  dextrose 5%. 1000 milliLiter(s) (50 mL/Hr) IV Continuous <Continuous>  dextrose 50% Injectable 25 Gram(s) IV Push once  dextrose 50% Injectable 25 Gram(s) IV Push once  dextrose 50% Injectable 12.5 Gram(s) IV Push once  glucagon  Injectable 1 milliGRAM(s) IntraMuscular once  insulin glargine Injectable (LANTUS) 18 Unit(s) SubCutaneous at bedtime  insulin lispro (ADMELOG) corrective regimen sliding scale   SubCutaneous at bedtime  insulin lispro (ADMELOG) corrective regimen sliding scale   SubCutaneous three times a day before meals  insulin lispro Injectable (ADMELOG) 6 Unit(s) SubCutaneous before breakfast  insulin lispro Injectable (ADMELOG) 8 Unit(s) SubCutaneous before dinner  insulin lispro Injectable (ADMELOG) 8 Unit(s) SubCutaneous before lunch  QUEtiapine 200 milliGRAM(s) Oral at bedtime    MEDICATIONS  (PRN):  acetaminophen     Tablet .. 650 milliGRAM(s) Oral every 6 hours PRN Temp greater or equal to 38C (100.4F), Mild Pain (1 - 3)  aluminum hydroxide/magnesium hydroxide/simethicone Suspension 30 milliLiter(s) Oral every 4 hours PRN Dyspepsia  dextrose Oral Gel 15 Gram(s) Oral once PRN Blood Glucose LESS THAN 70 milliGRAM(s)/deciliter  haloperidol    Injectable 5 milliGRAM(s) IntraMuscular every 6 hours PRN agitation  melatonin 3 milliGRAM(s) Oral at bedtime PRN Insomnia  ondansetron Injectable 4 milliGRAM(s) IV Push every 8 hours PRN Nausea and/or Vomiting    HOME MEDICATIONS:    PHYSICAL EXAM:  Vital Signs Last 24 Hrs  T(C): --  T(F): --  HR: --  BP: --  BP(mean): --  RR: --  SpO2: --      I&O's Summary    27 Nov 2023 07:01  -  27 Nov 2023 15:41  --------------------------------------------------------  IN: 240 mL / OUT: 0 mL / NET: 240 mL      CONSTITUTIONAL: Well-groomed, in no apparent distress  EYES: No conjunctival or scleral injection, non-icteric  ENMT: No external nasal lesions; Normal outer ears  NECK: Supple; Trachea midline  RESPIRATORY: Normal respiratory effort; lungs are clear to auscultation bilaterally without wheeze/rhonchi/rales  CARDIOVASCULAR: Regular rate and rhythm, normal S1 and S2, no murmur/rub/gallop; No lower extremity edema  GASTROINTESTINAL: Non-distended; No palpable masses; No tenderness; No rebound/guarding  MUSCULOSKELETAL:  Normal gait;  PSYCHIATRY: Mood and Affect appropriate    LABS:                    COVID-19 PCR: NotDetec (26 Nov 2023 22:58)  COVID-19 PCR: NotDetec (20 Nov 2023 17:46)      RADIOLOGY & ADDITIONAL TESTS:  EKG  12 Lead ECG:   Ventricular Rate 63 BPM    Atrial Rate 63 BPM    P-R Interval 104 ms    QRS Duration 100 ms    Q-T Interval 432 ms    QTC Calculation(Bazett) 442 ms    P Axis -5 degrees    R Axis -7 degrees    T Axis -16 degrees    Diagnosis Line SINUS RHYTHM WITHSHORT WY  LOW VOLTAGE QRS  BORDERLINE ECG  NO PREVIOUS ECGS AVAILABLE  Confirmed by RYAN CHANG MD ([id number]) on 11/27/2023 1:06:57 PM (11-21-23 @ 01:20)    
HPI:    Patient is a 53 F with history of schizoprehnia and T2D here for AMS. Endocrinology consulted for diabetes management.     Home diabetes medications:   - reports taking espirinone?    Inpatient diabetes medications:   - Lantus 12 units QHS  - Admelog 3 u nits TIDAC  - LDSS    Most recent HbA1C: 13.4      INTERVAL HPI/OVERNIGHT EVENTS:  Seen in the hallway, patient walking around with the PCA. reports good appetite, eating everything including her neighbor's left over food   Currently denies polydipsia, polyuria , visual changes, numbness in feet.      Review of systems:   CONSTITUTIONAL:  Feels well, good appetite  CARDIOVASCULAR:  Negative for chest pain or palpitations  RESPIRATORY:  Negative for cough, or SOB   GASTROINTESTINAL:  Negative for nausea, vomiting, or abdominal pain  GENITOURINARY:  Negative frequency, urgency or dysuria     CAPILLARY BLOOD GLUCOSE      POCT Blood Glucose.: 256 mg/dL (22 Nov 2023 12:12)  POCT Blood Glucose.: 308 mg/dL (22 Nov 2023 09:16)  POCT Blood Glucose.: 258 mg/dL (21 Nov 2023 22:47)  POCT Blood Glucose.: 331 mg/dL (21 Nov 2023 21:29)  POCT Blood Glucose.: 309 mg/dL (21 Nov 2023 17:44)       MEDICATIONS  (STANDING):  dextrose 5%. 1000 milliLiter(s) (50 mL/Hr) IV Continuous <Continuous>  dextrose 5%. 1000 milliLiter(s) (100 mL/Hr) IV Continuous <Continuous>  dextrose 50% Injectable 25 Gram(s) IV Push once  dextrose 50% Injectable 12.5 Gram(s) IV Push once  dextrose 50% Injectable 25 Gram(s) IV Push once  glucagon  Injectable 1 milliGRAM(s) IntraMuscular once  insulin glargine Injectable (LANTUS) 15 Unit(s) SubCutaneous at bedtime  insulin lispro (ADMELOG) corrective regimen sliding scale   SubCutaneous at bedtime  insulin lispro (ADMELOG) corrective regimen sliding scale   SubCutaneous three times a day before meals  insulin lispro Injectable (ADMELOG) 6 Unit(s) SubCutaneous three times a day before meals    MEDICATIONS  (PRN):  acetaminophen     Tablet .. 650 milliGRAM(s) Oral every 6 hours PRN Temp greater or equal to 38C (100.4F), Mild Pain (1 - 3)  aluminum hydroxide/magnesium hydroxide/simethicone Suspension 30 milliLiter(s) Oral every 4 hours PRN Dyspepsia  dextrose Oral Gel 15 Gram(s) Oral once PRN Blood Glucose LESS THAN 70 milliGRAM(s)/deciliter  melatonin 3 milliGRAM(s) Oral at bedtime PRN Insomnia  ondansetron Injectable 4 milliGRAM(s) IV Push every 8 hours PRN Nausea and/or Vomiting      PHYSICAL EXAM  Vital Signs Last 24 Hrs  T(C): 36.6 (21 Nov 2023 21:21), Max: 36.6 (21 Nov 2023 17:24)  T(F): 97.8 (21 Nov 2023 21:21), Max: 97.9 (21 Nov 2023 17:24)  HR: 73 (22 Nov 2023 05:16) (73 - 86)  BP: 97/59 (22 Nov 2023 05:16) (97/59 - 116/64)  BP(mean): --  RR: 18 (22 Nov 2023 05:16) (18 - 18)  SpO2: 97% (22 Nov 2023 05:16) (96% - 99%)    Parameters below as of 22 Nov 2023 05:16  Patient On (Oxygen Delivery Method): room air        GENERAL: feMale standing in hallway  RESPIRATORY: nonlabored breathing, no accessory muscle use  Extremities: Warm, no edema in all 4 exts   NEURO: A&O X3    LABS:                        14.2   10.77 )-----------( 329      ( 20 Nov 2023 17:48 )             40.7     11-22    135  |  101  |  12  ----------------------------<  300<H>  3.9   |  23  |  0.41<L>    Ca    9.4      22 Nov 2023 07:03    TPro  8.0  /  Alb  4.4  /  TBili  0.2  /  DBili  x   /  AST  10  /  ALT  19  /  AlkPhos  189<H>  11-20      Urinalysis Basic - ( 22 Nov 2023 07:03 )    Color: x / Appearance: x / SG: x / pH: x  Gluc: 300 mg/dL / Ketone: x  / Bili: x / Urobili: x   Blood: x / Protein: x / Nitrite: x   Leuk Esterase: x / RBC: x / WBC x   Sq Epi: x / Non Sq Epi: x / Bacteria: x      Thyroid Stimulating Hormone, Serum: 2.44 uIU/mL (11-22 @ 07:04)         
[facility] Division of Hospital Medicine  Len Orta MD  Available via MS Teams    SUBJECTIVE / OVERNIGHT EVENTS: No acute events overnight. Pt seen and examined at bedside. Denies any chest pain. C/o stomach discomfort.     ADDITIONAL REVIEW OF SYSTEMS:    MEDICATIONS  (STANDING):  dextrose 5%. 1000 milliLiter(s) (100 mL/Hr) IV Continuous <Continuous>  dextrose 5%. 1000 milliLiter(s) (50 mL/Hr) IV Continuous <Continuous>  dextrose 50% Injectable 25 Gram(s) IV Push once  dextrose 50% Injectable 12.5 Gram(s) IV Push once  dextrose 50% Injectable 25 Gram(s) IV Push once  glucagon  Injectable 1 milliGRAM(s) IntraMuscular once  insulin glargine Injectable (LANTUS) 18 Unit(s) SubCutaneous at bedtime  insulin lispro (ADMELOG) corrective regimen sliding scale   SubCutaneous at bedtime  insulin lispro (ADMELOG) corrective regimen sliding scale   SubCutaneous three times a day before meals  insulin lispro Injectable (ADMELOG) 6 Unit(s) SubCutaneous before breakfast  insulin lispro Injectable (ADMELOG) 8 Unit(s) SubCutaneous before dinner  insulin lispro Injectable (ADMELOG) 8 Unit(s) SubCutaneous before lunch  QUEtiapine 200 milliGRAM(s) Oral at bedtime    MEDICATIONS  (PRN):  acetaminophen     Tablet .. 650 milliGRAM(s) Oral every 6 hours PRN Temp greater or equal to 38C (100.4F), Mild Pain (1 - 3)  aluminum hydroxide/magnesium hydroxide/simethicone Suspension 30 milliLiter(s) Oral every 4 hours PRN Dyspepsia  dextrose Oral Gel 15 Gram(s) Oral once PRN Blood Glucose LESS THAN 70 milliGRAM(s)/deciliter  haloperidol    Injectable 5 milliGRAM(s) IntraMuscular every 6 hours PRN agitation  melatonin 3 milliGRAM(s) Oral at bedtime PRN Insomnia  ondansetron Injectable 4 milliGRAM(s) IV Push every 8 hours PRN Nausea and/or Vomiting      I&O's Summary    27 Nov 2023 07:01  -  28 Nov 2023 07:00  --------------------------------------------------------  IN: 240 mL / OUT: 0 mL / NET: 240 mL    28 Nov 2023 07:01  -  28 Nov 2023 14:52  --------------------------------------------------------  IN: 390 mL / OUT: 0 mL / NET: 390 mL        PHYSICAL EXAM:  Vital Signs Last 24 Hrs  T(C): --  T(F): --  HR: --  BP: --  BP(mean): --  RR: --  SpO2: --      CONSTITUTIONAL: NAD, well-developed, well-groomed  RESPIRATORY: Normal respiratory effort; lungs are clear to auscultation bilaterally  CARDIOVASCULAR: S1, s2, RRRR  ABDOMEN: Nontender to palpation, normoactive bowel sounds, no rebound/guarding  MUSCULOSKELETAL: no clubbing or cyanosis of digits; no joint swelling or tenderness to palpation  PSYCH: A+O to person at least, bizarre affect  SKIN: No rashes; no palpable lesions    LABS:                    COVID-19 PCR: NotDetec (26 Nov 2023 22:58)  COVID-19 PCR: NotDetec (20 Nov 2023 17:46)            RADIOLOGY & ADDITIONAL TESTS:  New Results Reviewed Today:   New Imaging Personally Reviewed Today:  New Electrocardiogram Personally Reviewed Today:  Prior or Outpatient Records Reviewed Today:    COMMUNICATION:  Care Discussed with Consultants/Other Providers and Details of Discussion: Discussed with ACP  Discussions with Patient/Family:  PCP Communication:
[facility] Division of Hospital Medicine  Len Orta MD  Available via MS Teams    SUBJECTIVE / OVERNIGHT EVENTS: No acute events overnight. Pt seen and examined at bedside. denies any chest pain or sob.     ADDITIONAL REVIEW OF SYSTEMS:    MEDICATIONS  (STANDING):  dextrose 5%. 1000 milliLiter(s) (50 mL/Hr) IV Continuous <Continuous>  dextrose 5%. 1000 milliLiter(s) (100 mL/Hr) IV Continuous <Continuous>  dextrose 50% Injectable 25 Gram(s) IV Push once  dextrose 50% Injectable 25 Gram(s) IV Push once  dextrose 50% Injectable 12.5 Gram(s) IV Push once  glucagon  Injectable 1 milliGRAM(s) IntraMuscular once  insulin glargine Injectable (LANTUS) 18 Unit(s) SubCutaneous at bedtime  insulin lispro (ADMELOG) corrective regimen sliding scale   SubCutaneous three times a day before meals  insulin lispro (ADMELOG) corrective regimen sliding scale   SubCutaneous at bedtime  insulin lispro Injectable (ADMELOG) 6 Unit(s) SubCutaneous before breakfast  insulin lispro Injectable (ADMELOG) 10 Unit(s) SubCutaneous before lunch  insulin lispro Injectable (ADMELOG) 10 Unit(s) SubCutaneous before dinner  QUEtiapine 200 milliGRAM(s) Oral at bedtime    MEDICATIONS  (PRN):  acetaminophen     Tablet .. 650 milliGRAM(s) Oral every 6 hours PRN Temp greater or equal to 38C (100.4F), Mild Pain (1 - 3)  aluminum hydroxide/magnesium hydroxide/simethicone Suspension 30 milliLiter(s) Oral every 4 hours PRN Dyspepsia  dextrose Oral Gel 15 Gram(s) Oral once PRN Blood Glucose LESS THAN 70 milliGRAM(s)/deciliter  haloperidol    Injectable 5 milliGRAM(s) IntraMuscular every 6 hours PRN agitation  melatonin 3 milliGRAM(s) Oral at bedtime PRN Insomnia  ondansetron Injectable 4 milliGRAM(s) IV Push every 8 hours PRN Nausea and/or Vomiting      I&O's Summary    28 Nov 2023 07:01  -  29 Nov 2023 07:00  --------------------------------------------------------  IN: 390 mL / OUT: 0 mL / NET: 390 mL        T(C): 36.6 (11-29-23 @ 10:44), Max: 36.6 (11-29-23 @ 10:44)  HR: 96 (11-29-23 @ 10:44) (96 - 96)  BP: 119/72 (11-29-23 @ 10:44) (119/72 - 119/72)  RR: 18 (11-29-23 @ 10:44) (18 - 18)  SpO2: 96% (11-29-23 @ 10:44) (96% - 96%)    CONSTITUTIONAL: NAD, walking around  RESPIRATORY: Normal respiratory effort; lungs are clear to auscultation bilaterally  CARDIOVASCULAR: S1, s2, RRR  ABDOMEN: Nontender to palpation, normoactive bowel sounds, no rebound/guarding  MUSCULOSKELETAL: no clubbing or cyanosis of digits; no joint swelling or tenderness to palpation  PSYCH: A+O to person at least, bizarre affect  SKIN: No rashes; no palpable lesions    LABS:                    COVID-19 PCR: NotDetec (26 Nov 2023 22:58)  COVID-19 PCR: NotDetec (20 Nov 2023 17:46)          RADIOLOGY & ADDITIONAL TESTS:  New Results Reviewed Today:   New Imaging Personally Reviewed Today:  New Electrocardiogram Personally Reviewed Today:  Prior or Outpatient Records Reviewed Today:    COMMUNICATION:  Care Discussed with Consultants/Other Providers and Details of Discussion: Discussed with ACP  Discussions with Patient/Family:  PCP Communication:
Mohsin Khan, MD  Attending Physician, Division Of Hospital Medicine  Pager: [phone], Office: [phone]  Off hour pager: [phone]    Patient is a 53y old  Female who presents with a chief complaint of AMS, Hyperglycemia     SUBJECTIVE / OVERNIGHT EVENTS:  Seen, examined the patient this am  Resting in bed, no agitations or restlessness per attendant, on 1:1, afebrile, VSS  Refused to be examined.    MEDICATIONS  (STANDING):  dextrose 5%. 1000 milliLiter(s) (100 mL/Hr) IV Continuous <Continuous>  dextrose 5%. 1000 milliLiter(s) (50 mL/Hr) IV Continuous <Continuous>  dextrose 50% Injectable 25 Gram(s) IV Push once  dextrose 50% Injectable 12.5 Gram(s) IV Push once  dextrose 50% Injectable 25 Gram(s) IV Push once  glucagon  Injectable 1 milliGRAM(s) IntraMuscular once  insulin glargine Injectable (LANTUS) 18 Unit(s) SubCutaneous at bedtime  insulin lispro (ADMELOG) corrective regimen sliding scale   SubCutaneous at bedtime  insulin lispro (ADMELOG) corrective regimen sliding scale   SubCutaneous three times a day before meals  insulin lispro Injectable (ADMELOG) 6 Unit(s) SubCutaneous before dinner  insulin lispro Injectable (ADMELOG) 6 Unit(s) SubCutaneous before breakfast  insulin lispro Injectable (ADMELOG) 8 Unit(s) SubCutaneous before lunch  QUEtiapine 100 milliGRAM(s) Oral at bedtime    MEDICATIONS  (PRN):  acetaminophen     Tablet .. 650 milliGRAM(s) Oral every 6 hours PRN Temp greater or equal to 38C (100.4F), Mild Pain (1 - 3)  aluminum hydroxide/magnesium hydroxide/simethicone Suspension 30 milliLiter(s) Oral every 4 hours PRN Dyspepsia  dextrose Oral Gel 15 Gram(s) Oral once PRN Blood Glucose LESS THAN 70 milliGRAM(s)/deciliter  melatonin 3 milliGRAM(s) Oral at bedtime PRN Insomnia  ondansetron Injectable 4 milliGRAM(s) IV Push every 8 hours PRN Nausea and/or Vomiting      Vital Signs Last 24 Hrs  T(C): 36.8 (25 Nov 2023 05:55), Max: 36.8 (25 Nov 2023 05:55)  T(F): 98.3 (25 Nov 2023 05:55), Max: 98.3 (25 Nov 2023 05:55)  HR: 85 (25 Nov 2023 05:55) (85 - 85)  BP: 98/60 (25 Nov 2023 05:55) (98/60 - 98/60)  BP(mean): --  RR: 18 (25 Nov 2023 05:55) (18 - 18)  SpO2: 97% (25 Nov 2023 05:55) (97% - 97%)    Parameters below as of 25 Nov 2023 05:55  Patient On (Oxygen Delivery Method): room air      CAPILLARY BLOOD GLUCOSE      POCT Blood Glucose.: 113 mg/dL (24 Nov 2023 21:56)  POCT Blood Glucose.: 138 mg/dL (24 Nov 2023 17:22)  POCT Blood Glucose.: 207 mg/dL (24 Nov 2023 13:09)    I&O's Summary      PHYSICAL EXAM:-  GENERAL: NAD, well-developed  EYES: EOMI, PERRLA, conjunctiva and sclera clear  NECK: Supple, No JVD, no thyromegaly  CHEST/LUNG: Clear to auscultation bilaterally; No wheeze  HEART: Regular rate and rhythm; S1, S2 audible, No murmurs, rubs, or gallops  ABDOMEN: Soft, Nontender, Nondistended; Bowel sounds present  EXTREMITIES:  2+ Peripheral Pulses, No clubbing, cyanosis, or edema  NEURO: Doesn't want to talk, AA, delusional at time,  no focal deficit      LABS:        RADIOLOGY & ADDITIONAL TESTS:    Imaging Personally Reviewed: [facility]  Consultant(s) Notes Reviewed:  Psych    
DIABETES FOLLOW UP NOTE: Saw pt earlier today    Chief Complaint: Endocrine consult requested for management of T2DM    INTERVAL HX: Found pt on floor with covers over her head and no answering to any questions. Per staff, pt tolerating POs but refusing POC and insulin doses on and off. Noted BG 400s yesterday evening after refusing insulin for lunch and eating most of it. Staff also reports pt wants to eat from pantry so goes out the room to do so. Awaiting transfer to [facility] for psych treatment.       Review of Systems:  General: As above  Doesn't answer to any questions      Allergies    No Known Allergies    Intolerances      MEDICATIONS:  insulin glargine Injectable (LANTUS) 18 Unit(s) SubCutaneous at bedtime  insulin lispro (ADMELOG) corrective regimen sliding scale   SubCutaneous at bedtime  insulin lispro (ADMELOG) corrective regimen sliding scale   SubCutaneous three times a day before meals  insulin lispro Injectable (ADMELOG) 6 Unit(s) SubCutaneous before breakfast  insulin lispro Injectable (ADMELOG) 8 Unit(s) SubCutaneous before dinner  insulin lispro Injectable (ADMELOG) 8 Unit(s) SubCutaneous before lunch    PHYSICAL EXAM:  VITALS: T(C): --  T(F): --  HR: --  BP: --  RR: --  SpO2: --  Wt(kg): --  GENERAL: Female laying on floor in NAD. Pt in 1 to 1 observation  NEURO: Alert but doesn't answer to any questions. Spoke to pt in English and Syrian  Pt refused examination. Stated "don't touch me"     LABS:  POCT Blood Glucose.: 207 mg/dL (11-28-23 @ 17:11)  POCT Blood Glucose.: 123 mg/dL (11-28-23 @ 13:31)  POCT Blood Glucose.: 166 mg/dL (11-28-23 @ 09:49)  POCT Blood Glucose.: 139 mg/dL (11-27-23 @ 21:50)  POCT Blood Glucose.: 402 mg/dL (11-27-23 @ 18:08)  POCT Blood Glucose.: 432 mg/dL (11-27-23 @ 18:06)  POCT Blood Glucose.: 162 mg/dL (11-27-23 @ 09:10)  POCT Blood Glucose.: 223 mg/dL (11-26-23 @ 22:17)  POCT Blood Glucose.: 313 mg/dL (11-26-23 @ 16:08)  POCT Blood Glucose.: 203 mg/dL (11-26-23 @ 09:45)  POCT Blood Glucose.: 268 mg/dL (11-25-23 @ 22:39)      Thyroid Function Tests:  11-22 @ 07:04 TSH 2.44 FreeT4 1.2 T3 -- Anti TPO -- Anti Thyroglobulin Ab -- TSI --      A1C with Estimated Average Glucose Result: 13.4 % (11-20-23 @ 22:35)      Estimated Average Glucose: 338 mg/dL (11-20-23 @ 22:35)                      
PROGRESS NOTE:   Isaeblla Ruiz, DO  Hospitalist  Teams  After 5pm/weekends or if no answer ext: [phone]      Patient is a 53y old  Female who presents with a chief complaint of AMS, Hyperglycemia (23 Nov 2023 12:09)      SUBJECTIVE / OVERNIGHT EVENTS:  Awake and answering questions in Vietnamese today.  Walking around the unit, calm.  Per nursing pt has been getting snacks/oreos yesterday which raised her BG levels.  Pt states that she has no family or friends that we can call.     ADDITIONAL REVIEW OF SYSTEMS:  no fever or chills no n/v/d    MEDICATIONS  (STANDING):  dextrose 5%. 1000 milliLiter(s) (100 mL/Hr) IV Continuous <Continuous>  dextrose 5%. 1000 milliLiter(s) (50 mL/Hr) IV Continuous <Continuous>  dextrose 50% Injectable 25 Gram(s) IV Push once  dextrose 50% Injectable 25 Gram(s) IV Push once  dextrose 50% Injectable 12.5 Gram(s) IV Push once  glucagon  Injectable 1 milliGRAM(s) IntraMuscular once  insulin glargine Injectable (LANTUS) 18 Unit(s) SubCutaneous at bedtime  insulin lispro (ADMELOG) corrective regimen sliding scale   SubCutaneous at bedtime  insulin lispro (ADMELOG) corrective regimen sliding scale   SubCutaneous three times a day before meals  insulin lispro Injectable (ADMELOG) 6 Unit(s) SubCutaneous before dinner  insulin lispro Injectable (ADMELOG) 6 Unit(s) SubCutaneous before breakfast  insulin lispro Injectable (ADMELOG) 8 Unit(s) SubCutaneous before lunch  QUEtiapine 100 milliGRAM(s) Oral at bedtime    MEDICATIONS  (PRN):  acetaminophen     Tablet .. 650 milliGRAM(s) Oral every 6 hours PRN Temp greater or equal to 38C (100.4F), Mild Pain (1 - 3)  aluminum hydroxide/magnesium hydroxide/simethicone Suspension 30 milliLiter(s) Oral every 4 hours PRN Dyspepsia  dextrose Oral Gel 15 Gram(s) Oral once PRN Blood Glucose LESS THAN 70 milliGRAM(s)/deciliter  melatonin 3 milliGRAM(s) Oral at bedtime PRN Insomnia  ondansetron Injectable 4 milliGRAM(s) IV Push every 8 hours PRN Nausea and/or Vomiting      CAPILLARY BLOOD GLUCOSE      POCT Blood Glucose.: 229 mg/dL (24 Nov 2023 09:03)  POCT Blood Glucose.: 117 mg/dL (23 Nov 2023 22:23)  POCT Blood Glucose.: 310 mg/dL (23 Nov 2023 17:41)  POCT Blood Glucose.: 180 mg/dL (23 Nov 2023 13:05)    I&O's Summary      PHYSICAL EXAM:  Vital Signs Last 24 Hrs  T(C): 36.7 (24 Nov 2023 06:43), Max: 36.7 (24 Nov 2023 06:43)  T(F): 98 (24 Nov 2023 06:43), Max: 98 (24 Nov 2023 06:43)  HR: 75 (24 Nov 2023 06:43) (66 - 75)  BP: 98/61 (24 Nov 2023 06:43) (98/61 - 108/63)  BP(mean): --  RR: 18 (24 Nov 2023 06:43) (18 - 18)  SpO2: 98% (24 Nov 2023 06:43) (98% - 99%)    Parameters below as of 24 Nov 2023 06:43  Patient On (Oxygen Delivery Method): room air        CONSTITUTIONAL: NAD, well-developed, disheveled but non toxic   RESPIRATORY: Normal respiratory effort; lungs are clear to auscultation bilaterally  CARDIOVASCULAR: Regular rate and rhythm, normal S1 and S2, no murmur/rub/gallop; No lower extremity edema; Peripheral pulses are 2+ bilaterally  ABDOMEN: Nontender to palpation, normoactive bowel sounds, no rebound/guarding; No hepatosplenomegaly  MUSCLOSKELETAL: no clubbing or cyanosis of digits; no joint swelling or tenderness to palpation  PSYCH: AAOx2-3    LABS:                      RADIOLOGY & ADDITIONAL TESTS:  Results Reviewed:   Imaging Personally Reviewed:  Electrocardiogram Personally Reviewed:    COORDINATION OF CARE:  Care Discussed with Consultants/Other Providers [Y/N]:  Prior or Outpatient Records Reviewed [Y/N]:  
Mohsin Khan, MD  Attending Physician, Division Of Hospital Medicine  Pager: [phone], Office: [phone]  Off hour pager: [phone]    Patient is a 53y old  Female who presents with a chief complaint of AMS, Hyperglycemia     SUBJECTIVE / OVERNIGHT EVENTS:  Seen, examined the patient this am  Resting in bed, no agitations or restlessness per attendant, on 1:1, afebrile, VSS    MEDICATIONS  (STANDING):  dextrose 5%. 1000 milliLiter(s) (100 mL/Hr) IV Continuous <Continuous>  dextrose 5%. 1000 milliLiter(s) (50 mL/Hr) IV Continuous <Continuous>  dextrose 50% Injectable 25 Gram(s) IV Push once  dextrose 50% Injectable 12.5 Gram(s) IV Push once  dextrose 50% Injectable 25 Gram(s) IV Push once  glucagon  Injectable 1 milliGRAM(s) IntraMuscular once  insulin glargine Injectable (LANTUS) 18 Unit(s) SubCutaneous at bedtime  insulin lispro (ADMELOG) corrective regimen sliding scale   SubCutaneous at bedtime  insulin lispro (ADMELOG) corrective regimen sliding scale   SubCutaneous three times a day before meals  insulin lispro Injectable (ADMELOG) 6 Unit(s) SubCutaneous before dinner  insulin lispro Injectable (ADMELOG) 6 Unit(s) SubCutaneous before breakfast  insulin lispro Injectable (ADMELOG) 8 Unit(s) SubCutaneous before lunch  QUEtiapine 100 milliGRAM(s) Oral at bedtime    MEDICATIONS  (PRN):  acetaminophen     Tablet .. 650 milliGRAM(s) Oral every 6 hours PRN Temp greater or equal to 38C (100.4F), Mild Pain (1 - 3)  aluminum hydroxide/magnesium hydroxide/simethicone Suspension 30 milliLiter(s) Oral every 4 hours PRN Dyspepsia  dextrose Oral Gel 15 Gram(s) Oral once PRN Blood Glucose LESS THAN 70 milliGRAM(s)/deciliter  melatonin 3 milliGRAM(s) Oral at bedtime PRN Insomnia  ondansetron Injectable 4 milliGRAM(s) IV Push every 8 hours PRN Nausea and/or Vomiting      Vital Signs Last 24 Hrs  T(C): 36.8 (25 Nov 2023 05:55), Max: 36.8 (25 Nov 2023 05:55)  T(F): 98.3 (25 Nov 2023 05:55), Max: 98.3 (25 Nov 2023 05:55)  HR: 85 (25 Nov 2023 05:55) (85 - 85)  BP: 98/60 (25 Nov 2023 05:55) (98/60 - 98/60)  BP(mean): --  RR: 18 (25 Nov 2023 05:55) (18 - 18)  SpO2: 97% (25 Nov 2023 05:55) (97% - 97%)    Parameters below as of 25 Nov 2023 05:55  Patient On (Oxygen Delivery Method): room air      CAPILLARY BLOOD GLUCOSE      POCT Blood Glucose.: 113 mg/dL (24 Nov 2023 21:56)  POCT Blood Glucose.: 138 mg/dL (24 Nov 2023 17:22)  POCT Blood Glucose.: 207 mg/dL (24 Nov 2023 13:09)    I&O's Summary      PHYSICAL EXAM:-  GENERAL: NAD, well-developed  EYES: EOMI, PERRLA, conjunctiva and sclera clear  NECK: Supple, No JVD, no thyromegaly  CHEST/LUNG: Clear to auscultation bilaterally; No wheeze  HEART: Regular rate and rhythm; S1, S2 audible, No murmurs, rubs, or gallops  ABDOMEN: Soft, Nontender, Nondistended; Bowel sounds present  EXTREMITIES:  2+ Peripheral Pulses, No clubbing, cyanosis, or edema  NEURO: AAOx3, no focal deficit      LABS:        RADIOLOGY & ADDITIONAL TESTS:    Imaging Personally Reviewed: [facility]  Consultant(s) Notes Reviewed:  Psych    
Mohsin Khan, MD  Attending Physician, Division Of Hospital Medicine  Pager: [phone], Office: [phone]  Off hour pager: [phone]    Patient is a 53y old  Female who presents with a chief complaint of AMS, Hyperglycemia    SUBJECTIVE / OVERNIGHT EVENTS:  Seen, examined the patient this am  Walking in the hallway with 1:1, kissing the floor at time, confused, delusional, VSS    MEDICATIONS  (STANDING):  dextrose 5%. 1000 milliLiter(s) (100 mL/Hr) IV Continuous <Continuous>  dextrose 5%. 1000 milliLiter(s) (50 mL/Hr) IV Continuous <Continuous>  dextrose 50% Injectable 25 Gram(s) IV Push once  dextrose 50% Injectable 12.5 Gram(s) IV Push once  dextrose 50% Injectable 25 Gram(s) IV Push once  glucagon  Injectable 1 milliGRAM(s) IntraMuscular once  insulin glargine Injectable (LANTUS) 15 Unit(s) SubCutaneous at bedtime  insulin lispro (ADMELOG) corrective regimen sliding scale   SubCutaneous at bedtime  insulin lispro (ADMELOG) corrective regimen sliding scale   SubCutaneous three times a day before meals  insulin lispro Injectable (ADMELOG) 6 Unit(s) SubCutaneous three times a day before meals    MEDICATIONS  (PRN):  acetaminophen     Tablet .. 650 milliGRAM(s) Oral every 6 hours PRN Temp greater or equal to 38C (100.4F), Mild Pain (1 - 3)  aluminum hydroxide/magnesium hydroxide/simethicone Suspension 30 milliLiter(s) Oral every 4 hours PRN Dyspepsia  dextrose Oral Gel 15 Gram(s) Oral once PRN Blood Glucose LESS THAN 70 milliGRAM(s)/deciliter  melatonin 3 milliGRAM(s) Oral at bedtime PRN Insomnia  ondansetron Injectable 4 milliGRAM(s) IV Push every 8 hours PRN Nausea and/or Vomiting      Vital Signs Last 24 Hrs  T(C): 36.7 (23 Nov 2023 05:23), Max: 36.7 (23 Nov 2023 05:23)  T(F): 98.1 (23 Nov 2023 05:23), Max: 98.1 (23 Nov 2023 05:23)  HR: 74 (23 Nov 2023 05:23) (74 - 86)  BP: 120/69 (23 Nov 2023 05:23) (120/69 - 133/83)  BP(mean): --  RR: 18 (23 Nov 2023 05:23) (18 - 18)  SpO2: 96% (23 Nov 2023 05:23) (96% - 98%)    Parameters below as of 23 Nov 2023 05:23  Patient On (Oxygen Delivery Method): room air      CAPILLARY BLOOD GLUCOSE      POCT Blood Glucose.: 238 mg/dL (23 Nov 2023 08:59)  POCT Blood Glucose.: 159 mg/dL (22 Nov 2023 21:41)  POCT Blood Glucose.: 464 mg/dL (22 Nov 2023 17:47)  POCT Blood Glucose.: 508 mg/dL (22 Nov 2023 17:45)  POCT Blood Glucose.: 256 mg/dL (22 Nov 2023 12:12)    I&O's Summary      PHYSICAL EXAM:-  GENERAL: NAD, well-developed  EYES: EOMI, PERRLA, conjunctiva and sclera clear  NECK: Supple, No JVD, no thyromegaly  CHEST/LUNG: Clear to auscultation bilaterally; No wheeze  HEART: Regular rate and rhythm; S1, S2 audible, No murmurs, rubs, or gallops  ABDOMEN: Soft, Nontender, Nondistended; Bowel sounds present  EXTREMITIES:  2+ Peripheral Pulses, No clubbing, cyanosis, or edema  NEURO: AAOx3, no focal deficit      LABS:    11-22    135  |  101  |  12  ----------------------------<  300<H>  3.9   |  23  |  0.41<L>    Ca    9.4      22 Nov 2023 07:03            Urinalysis Basic - ( 22 Nov 2023 07:03 )    Color: x / Appearance: x / SG: x / pH: x  Gluc: 300 mg/dL / Ketone: x  / Bili: x / Urobili: x   Blood: x / Protein: x / Nitrite: x   Leuk Esterase: x / RBC: x / WBC x   Sq Epi: x / Non Sq Epi: x / Bacteria: x        RADIOLOGY & ADDITIONAL TESTS:    Imaging Personally Reviewed: CXR  Consultant(s) Notes Reviewed: Adrien Caldwell    
Seen earlier today     Chief Complaint: Diabetes Mellitus follow up    INTERVAL HX: pt was walking in the hallway with staff under constant observation. Attempted to speak with patient, however pt refused to speak with this provider. As per PCA, she is tolerating POs, eats full meals. Noted that she refuses insulin injections and BG check usually at lunch time. BGs variable (  -400s). As per team, plan for psych admission pending endocrine final rec and Involuntary legals.       Review of Systems:  General: As above  GI: No nausea, vomiting  Endocrine: no  S&Sx of hypoglycemia    Allergies    No Known Allergies    Intolerances      MEDICATIONS  (STANDING):  dextrose 5%. 1000 milliLiter(s) (100 mL/Hr) IV Continuous <Continuous>  dextrose 5%. 1000 milliLiter(s) (50 mL/Hr) IV Continuous <Continuous>  dextrose 50% Injectable 25 Gram(s) IV Push once  dextrose 50% Injectable 25 Gram(s) IV Push once  dextrose 50% Injectable 12.5 Gram(s) IV Push once  glucagon  Injectable 1 milliGRAM(s) IntraMuscular once  insulin glargine Injectable (LANTUS) 18 Unit(s) SubCutaneous at bedtime  insulin lispro (ADMELOG) corrective regimen sliding scale   SubCutaneous at bedtime  insulin lispro (ADMELOG) corrective regimen sliding scale   SubCutaneous three times a day before meals  insulin lispro Injectable (ADMELOG) 6 Unit(s) SubCutaneous before breakfast  insulin lispro Injectable (ADMELOG) 8 Unit(s) SubCutaneous before dinner  insulin lispro Injectable (ADMELOG) 8 Unit(s) SubCutaneous before lunch      insulin glargine Injectable (LANTUS)   18 Unit(s) SubCutaneous (11-26-23 @ 22:25)    insulin lispro (ADMELOG) corrective regimen sliding scale   6 Unit(s) SubCutaneous (11-27-23 @ 18:11)   1 Unit(s) SubCutaneous (11-27-23 @ 09:20)    insulin lispro Injectable (ADMELOG)   6 Unit(s) SubCutaneous (11-27-23 @ 09:20)    insulin lispro Injectable (ADMELOG)   8 Unit(s) SubCutaneous (11-27-23 @ 18:11)        PHYSICAL EXAM:  VITALS: T(C): --  T(F): --  HR: --  BP: --  RR: --  SpO2: --  Wt(kg): --  GENERAL: Walking in [address]  in NAD  Respiratory: Respirations unlabored   Extremities: Warm, no edema  NEURO: Alert , appropriate     LABS:  POCT Blood Glucose.: 402 mg/dL (11-27-23 @ 18:08)  POCT Blood Glucose.: 432 mg/dL (11-27-23 @ 18:06)  POCT Blood Glucose.: 162 mg/dL (11-27-23 @ 09:10)  POCT Blood Glucose.: 223 mg/dL (11-26-23 @ 22:17)  POCT Blood Glucose.: 313 mg/dL (11-26-23 @ 16:08)  POCT Blood Glucose.: 203 mg/dL (11-26-23 @ 09:45)  POCT Blood Glucose.: 268 mg/dL (11-25-23 @ 22:39)  POCT Blood Glucose.: 297 mg/dL (11-25-23 @ 16:27)  POCT Blood Glucose.: 113 mg/dL (11-24-23 @ 21:56)                Thyroid Function Tests:  11-22 @ 07:04 TSH 2.44 FreeT4 1.2 T3 -- Anti TPO -- Anti Thyroglobulin Ab -- TSI --      A1C with Estimated Average Glucose Result: 13.4 % (11-20-23 @ 22:35)    Estimated Average Glucose: 338 mg/dL (11-20-23 @ 22:35)        Diet, Consistent Carbohydrate w/Evening Snack (11-21-23 @ 16:49) [Active]            
PROGRESS NOTE:   Isabella Ruiz, DO  Hospitalist  Teams  After 5pm/weekends or if no answer ext: [phone]      Patient is a 53y old  Female who presents with a chief complaint of AMS, Hyperglycemia (21 Nov 2023 13:01)      SUBJECTIVE / OVERNIGHT EVENTS:  Saw patient ambulating in the hallway earlier during IDRs, but during my bedside evaluation she was under the covers, refusing to speak or take the covers down. Attempted to examine, and patient would swat hand away gently.     ADDITIONAL REVIEW OF SYSTEMS:  not able to obtain, pt not participating in exam    MEDICATIONS  (STANDING):  dextrose 5%. 1000 milliLiter(s) (100 mL/Hr) IV Continuous <Continuous>  dextrose 5%. 1000 milliLiter(s) (50 mL/Hr) IV Continuous <Continuous>  dextrose 50% Injectable 25 Gram(s) IV Push once  dextrose 50% Injectable 25 Gram(s) IV Push once  dextrose 50% Injectable 12.5 Gram(s) IV Push once  glucagon  Injectable 1 milliGRAM(s) IntraMuscular once  insulin glargine Injectable (LANTUS) 12 Unit(s) SubCutaneous at bedtime  insulin lispro (ADMELOG) corrective regimen sliding scale   SubCutaneous three times a day before meals  insulin lispro Injectable (ADMELOG) 3 Unit(s) SubCutaneous three times a day before meals    MEDICATIONS  (PRN):  acetaminophen     Tablet .. 650 milliGRAM(s) Oral every 6 hours PRN Temp greater or equal to 38C (100.4F), Mild Pain (1 - 3)  aluminum hydroxide/magnesium hydroxide/simethicone Suspension 30 milliLiter(s) Oral every 4 hours PRN Dyspepsia  dextrose Oral Gel 15 Gram(s) Oral once PRN Blood Glucose LESS THAN 70 milliGRAM(s)/deciliter  melatonin 3 milliGRAM(s) Oral at bedtime PRN Insomnia  ondansetron Injectable 4 milliGRAM(s) IV Push every 8 hours PRN Nausea and/or Vomiting      CAPILLARY BLOOD GLUCOSE      POCT Blood Glucose.: 256 mg/dL (22 Nov 2023 12:12)  POCT Blood Glucose.: 308 mg/dL (22 Nov 2023 09:16)  POCT Blood Glucose.: 258 mg/dL (21 Nov 2023 22:47)  POCT Blood Glucose.: 331 mg/dL (21 Nov 2023 21:29)  POCT Blood Glucose.: 309 mg/dL (21 Nov 2023 17:44)    I&O's Summary    21 Nov 2023 07:01  -  22 Nov 2023 07:00  --------------------------------------------------------  IN: 200 mL / OUT: 0 mL / NET: 200 mL        PHYSICAL EXAM:  Vital Signs Last 24 Hrs  T(C): 36.6 (21 Nov 2023 21:21), Max: 36.6 (21 Nov 2023 17:24)  T(F): 97.8 (21 Nov 2023 21:21), Max: 97.9 (21 Nov 2023 17:24)  HR: 73 (22 Nov 2023 05:16) (73 - 86)  BP: 97/59 (22 Nov 2023 05:16) (97/59 - 116/64)  BP(mean): --  RR: 18 (22 Nov 2023 05:16) (18 - 18)  SpO2: 97% (22 Nov 2023 05:16) (96% - 99%)    Parameters below as of 22 Nov 2023 05:16  Patient On (Oxygen Delivery Method): room air        CONSTITUTIONAL: Thin, no acute distress  PSYCH: not answering any questions  Pt refused rest of exam    LABS:                        14.2   10.77 )-----------( 329      ( 20 Nov 2023 17:48 )             40.7     11-22    135  |  101  |  12  ----------------------------<  300<H>  3.9   |  23  |  0.41<L>    Ca    9.4      22 Nov 2023 07:03    TPro  8.0  /  Alb  4.4  /  TBili  0.2  /  DBili  x   /  AST  10  /  ALT  19  /  AlkPhos  189<H>  11-20          Urinalysis Basic - ( 22 Nov 2023 07:03 )    Color: x / Appearance: x / SG: x / pH: x  Gluc: 300 mg/dL / Ketone: x  / Bili: x / Urobili: x   Blood: x / Protein: x / Nitrite: x   Leuk Esterase: x / RBC: x / WBC x   Sq Epi: x / Non Sq Epi: x / Bacteria: x          RADIOLOGY & ADDITIONAL TESTS:  Results Reviewed:   Imaging Personally Reviewed:  Electrocardiogram Personally Reviewed:    COORDINATION OF CARE:  Care Discussed with Consultants/Other Providers [Y/N]:  Prior or Outpatient Records Reviewed [Y/N]:

## 2023-11-29 NOTE — PROGRESS NOTE ADULT - PROBLEM SELECTOR PROBLEM 3
AMS (altered mental status)
HTN (hypertension)
AMS (altered mental status)
HLD (hyperlipidemia)
AMS (altered mental status)
AMS (altered mental status)

## 2023-11-29 NOTE — BH CONSULTATION LIAISON PROGRESS NOTE - NSBHFUPINTERVALHXFT_PSY_A_CORE
Patient seen and evaluated, staff consulted, chart, labs, meds reviewed. Pt poor historian, guarded, but engaging in a conversation. States her name as given today and her age correctly. She states she feels bad, her stomach is hurting, she endorses feeling depressed, denies SI/HI, denies perceptual disturbances. Still guarded and limited in her interaction with the staff.

## 2023-11-29 NOTE — BH CONSULTATION LIAISON PROGRESS NOTE - NSBHCONSFOLLOWNEEDS_PSY_ALL_CORE
Needs further psych safety assessment prior to discharge
pt will be admitted to psych 2pc status, legals left in chart/Needs further psych safety assessment prior to discharge

## 2023-11-29 NOTE — PROGRESS NOTE ADULT - ASSESSMENT
53F Bulgarian speaking patient with reported history of schizophrenia, diabetes is presenting due to possible psychiatric complaints/AMS.
  Patient is a 53 F with history of schizoprehnia and T2D here for AMS. Endocrinology consulted for diabetes management. Patient is high risk with high level decision making due to uncontrolled diabetes which places patient at high risk for cardiovascular and cerebrovascular events. Patient with lability of glucose requiring close monitoring and insulin adjustments.    # T2DM with hyperglycemia  - Most recent Hemoglobin A1C 13.4  - Home DM med: unclear, patient poor historian   - Current FS ranges from 200-300  - Current diet: CHO  - Please monitor blood glucose values TID AC & QHS while eating regular meals and Q6H while NPO  - Blood glucose goals pre-meal less than 140 mg/dL and random blood glucose less than 180 mg/dL  - Recommendations:  - Fasting glucose elevated, increase insulin Glargine 15 units QHS  - Postprandial glucose elevated, increase insulin Lispro to 6 units TID with meals, hold if NPO or if eating less than 50% of meals  - Continue with low dose correctional scale TID with meals  - Continue with low dose correctional scale QHS    Discharge planning:   - Home DM medications: unclear   - Discharge DM medications: due to high A1C, would benefit from at least once per insulin regimen, but will depend on patient's dispo plan. If amenable to insulin, likely basal insulin+ metformin 500 mg BID  - Patient can follow up at   [facility]  [address] [place], [address]  - Insulin teaching will be needed prior to discharge, but depending on patient's dispo plan   - Please ensure patient has the following diabetes supplies:  - Glucometer (ACCU_CHECK kyung Connect, Ascensia Contour Next EZ or One, Freestyle Freedome LITE or OneTouch Verio IQ)  - Glucometer test strips and lancets  (make sure compatible w/ glucometer), Dispense #100 (or #200) use as directed  - Lantus Solostar Pen (or Basaglar Kwikpen) __TBD__ units before bedtime (dose TBD)  - BD leonor 4mm pen needles.   - Please verify with pharmacy that each script is covered before discharge.  - Patient will need opthalmology and podiatry follow up as outpatient     # HTN  - BP goal 130/80  - Manage per primary team     # HLD  - Check lipid profile in the am   - Would benefit from moderate intensity statin   - Goal LDL<70      Thank you for the consult. Will continue to monitor. Please inform the endocrinology team at least 24 hours prior to intended discharge date.     Contact via pager or [email or web address] Teams during business hours. For follow up questions, discharge recommendations, or new consults please call answering service at [phone] (weekdays), [phone] (nights/weekends). For nonurgent matters, please email  [email or web address].        Mee Bass MD  Department of Endocrinology, Diabetes and metabolism   Pager [phone]  
53F Hungarian speaking patient with reported history of schizophrenia, diabetes is presenting due to possible psychiatric complaints/AMS.
53F Maori speaking patient with reported history of schizophrenia, diabetes is presenting due to possible psychiatric complaints/AMS.
53F Ukrainian speaking patient with reported history of schizophrenia, diabetes is presenting due to possible psychiatric complaints/AMS.
53F Yi speaking patient with reported history of schizophrenia, diabetes is presenting due to possible psychiatric complaints/AMS.
Patient is a 53 F with history of schizoprehnia and T2D here for AMS. Endocrinology consulted for diabetes management. Patient is high risk with high level decision making due to uncontrolled diabetes which places patient at high risk for cardiovascular and cerebrovascular events. Patient with lability of glucose requiring close monitoring and insulin adjustments. BG Goal 100-180mg/dl   Last 24 hour BGs variable 162-402. Difficult to evaluate insulin requirement because pt refuses insulin doses and BG check at times, also not sure if she is eating at random time. Elevated Predinner BGs for 3 days likely due to admelog not given for lunch ( pt refused ). Hyperglycemia at HS for 2 days noted.   Attempted to speak with patient to discuss the necessity of insulin at this time and diabetes management, however pt refused to speak with this provider.      # T2DM with hyperglycemia  - Most recent Hemoglobin A1C 13.4  - Home DM med: unclear, patient poor historian   - Current FS ranges from 200-300  - Current diet: CHO  - Please monitor blood glucose values TID AC & QHS while eating regular meals and Q6H while NPO  - Blood glucose goals pre-meal less than 140 mg/dL and random blood glucose less than 180 mg/dL  - Recommendations:  - Fasting glucose close to goal, Continue insulin Glargine 18units QHS  - Postprandial glucose elevated, Adjust insulin Lispro 6-8-8 units TID with meals, hold if NPO or if eating less than 50% of meals  - Continue with low dose correctional scale TID with meals  - Continue with low dose correctional scale QHS  - Please keep hypoglycemia protocol in place     Discharge planning:   - Home DM medications: unclear   - Discharge DM medications: due to high A1C, would benefit from at least once per insulin regimen.   Discharge to inpatient psych- continue basal and bolus insulin current dose. BGs to be monitored ACTID and at HS and insulin doses to be adjusted at the facility   Discharge home from inpatient psych- Need to reevaluate DM discharge regimen, pt's capacity to manage insulin injections safely at the time of discharge from facility. Recommend to continue at least daily basal injection. Basal and bolus vs basal plus metformin 500mg BID plus Glipizide 2.5 mg daily. As per , pt will go to shelter after discharge from inpatient psych facility - need to assess refrigerator availability at shelter while on insulin injection.    - Patient can follow up at   [facility] [address] [place], [address]  - Insulin teaching will be needed prior to discharge  - Please ensure patient has the following diabetes supplies:  - Glucometer (ACCU_CHECK kyung Connect, Ascensia Contour Next EZ or One, Freestyle Freedome LITE or OneTouch Verio IQ)  - Glucometer test strips and lancets  (make sure compatible w/ glucometer), Dispense #100 (or #200) use as directed  - Lantus Solostar Pen (or Basaglar Kwikpen) __TBD__ units before bedtime (dose TBD)  - BD leonor 4mm pen needles.   - Please verify with pharmacy that each script is covered before discharge.  - Patient will need opthalmology and podiatry follow up as outpatient     # HTN  - BP goal 130/80  - Manage per primary team     # HLD  - Check lipid profile in the am   - Would benefit from moderate intensity statin   - Goal LDL<70      Kerry Zeng NP-C     Contact via [employer] Teams during business hours  To reach covering provider access AMION via sun[employer] tools  For Urgent matters/after-hours/weekends/holidays please page endocrine fellow on call   For nonurgent matters please email [email or web address]    Please note that this patient may be followed by different provider tomorrow.  Notify endocrine 24 hours prior to discharge for final recommendations
52y/o  F w/h/o uncontrolled and untreated T2DM (A1C 13.4%) not taking any DM meds PTA per EMR. unknown DM complications. Also h/o schizoprehnia. Here with AMS. Endocrinology consulted for diabetes management. BG variable and unpredictable melinda to variable and unpredictable PO intake and refusal of BG testing and insulin administration on and off. Took meal time insulin today with BG going up at dinner time. Will adjust lunch time insulin dose to BG Goal 100-180mg/dl.    Home DM meds: None per EMR.               
53F Sinhala speaking patient with reported history of schizophrenia, diabetes is presenting due to possible psychiatric complaints/AMS.
53F Romansh speaking patient with reported history of schizophrenia, diabetes is presenting due to possible psychiatric complaints/AMS.
53F Urdu speaking patient with reported history of schizophrenia, diabetes is presenting due to possible psychiatric complaints/AMS.

## 2023-11-29 NOTE — PROGRESS NOTE ADULT - PROBLEM SELECTOR PLAN 4
- Likely worsening due to medication non-compliance- this is reported diagnosis  - Psych consulted  -Seroquel as above and EKG ordred
- Likely worsening due to medication non-compliance- this is reported diagnosis  - Psych consulted
Likely worsening due to medication non-compliance- this is reported diagnosis  - Psych rec 1:1, Seroquel as above  - Inpatient admission in Psych unit on 2PC in progress
Likely worsening due to medication non-compliance- this is reported diagnosis  - Psych rec 1:1, Seroquel as above  - Inpatient admission in Psych unit on 2PC
Likely worsening due to medication non-compliance- this is reported diagnosis  - Psych rec 1:1, Seroquel as above  - Inpatient admission in Psych unit on 2PC in progress
- Likely worsening due to medication non-compliance- this is reported diagnosis  - Psych consulted
Likely worsening due to medication non-compliance- this is reported diagnosis  - Psych rec 1:1, Seroquel as above  - Inpatient admission in Psych unit on 2PC
Likely worsening due to medication non-compliance- this is reported diagnosis  - Psych rec 1:1, Seroquel as above  - Inpatient admission in Psych unit on 2PC

## 2023-11-29 NOTE — PROGRESS NOTE ADULT - PROBLEM SELECTOR PROBLEM 1
Suicidal ideation
Suicidal ideation
Acute hyperglycemia
Acute hyperglycemia
Suicidal ideation
Type 2 diabetes mellitus with hyperglycemia
Acute hyperglycemia
Suicidal ideation
Uncontrolled type 2 diabetes mellitus with hyperglycemia, without long-term current use of insulin
Suicidal ideation

## 2023-11-29 NOTE — PROGRESS NOTE ADULT - PROVIDER SPECIALTY LIST ADULT
Endocrinology
Internal Medicine
Endocrinology
Endocrinology
Hospitalist
Hospitalist
Internal Medicine
Hospitalist
Internal Medicine

## 2023-11-29 NOTE — PROGRESS NOTE ADULT - PROBLEM SELECTOR PLAN 1
- Glucose of 530 on admission, improved now   A1c 13%  -Endo recs appreciated- Lantus 15units and Lispro 6units TID w/ ISS
- Glucose of 530 on admission, improved now, not in DKA  A1c 13%  -Endo recs appreciated- Lantus 15units and Lispro 6units TID w/ ISS
Patient expressed suicidal ideation  - 1:1 observation  - c/w Seroquel 100mg PO QHS, EKG Qtc 440s  - to be admitted to inpatient psych unit  - Medical clearance: no further medical optimization needed prior to being admitted for inpatient psych care
Patient expressed suicidal ideation  - 1:1 observation  - c/w Seroquel 100mg PO QHS, EKG Qtc 440s  - to be admitted to inpatient psych unit  - Medical clearance: no further medical optimization needed prior to being admitted for inpatient psych care
Patient expressed suicidal ideation  - 1:1 observation  - Psych consulted, evaluation ongoing planned for 2PC when endo clears  - c/w Seroquel 100mg PO QHS, EKG Qtc 440s.
- Test BG ac and hs while eating regular meals and Q6H while NPO  - Continue insulin Glargine 18units QHS  - Adjust Admelog insulin to 6-10-10 units TID with meals, hold if NPO or if eating less than 50% of meals  - Continue with Admeolog low dose correctional scales TID with meals and QHS  -will adjust insulin as needed  Discharge planning:   Discharge to inpatient psych- continue basal and bolus insulin current dose. BGs to be monitored ACTID and at HS and insulin doses to be adjusted at the facility  -Please have CHRISTOFER sam team follow pt while at [facility] for further insulin adjustments as well as discharge plan according to pt disposition. Pt will likely start antipsychotic meds that can worsen glycemic control.   -Per EMR pt lives in shelter. Needs CHIKI howard at [facility] for transition of care.     -Make sure pt has Rx for all DM supplies and insulin/ DM meds.  - Patient can follow up at [facility] [address] [place], [address]  - Patient will need opthalmology and podiatry follow up as outpatient
Patient expressed suicidal ideation  - 1:1 observation  - Psych consulted, evaluation ongoing planned for 2PC when endo clears  - c/w Seroquel 100mg PO QHS, EKG Qtc 440s.
- Glucose of 530 on admission, improved now, not in DKA  A1c 13%  -Endo recs appreciated- Lantus 18units and Lispro 6units before breakfast and dinner and 8 units before lunch w/ ISS  -Was having snacks yesterday which may have contributed to hyperglycemia
Patient expressed suicidal ideation  - 1:1 observation  - c/w Seroquel 100mg PO QHS, EKG Qtc 440s  - to be admitted to inpatient psych unit  - Medical clearance: no further medical optimization needed prior to being admitted for inpatient psych care

## 2023-11-29 NOTE — BH CONSULTATION LIAISON PROGRESS NOTE - NSBHMSEGAIT_PSY_A_CORE
Unable to assess
Normal gait / station
Normal gait / station
Unable to assess
Normal gait / station
Unable to assess

## 2023-11-29 NOTE — BH CONSULTATION LIAISON PROGRESS NOTE - NSBHCHARTREVIEWVS_PSY_A_CORE FT
Vital Signs Last 24 Hrs  T(C): 36.6 (21 Nov 2023 21:21), Max: 36.6 (21 Nov 2023 17:24)  T(F): 97.8 (21 Nov 2023 21:21), Max: 97.9 (21 Nov 2023 17:24)  HR: 73 (22 Nov 2023 05:16) (73 - 86)  BP: 97/59 (22 Nov 2023 05:16) (97/59 - 116/64)  BP(mean): --  RR: 18 (22 Nov 2023 05:16) (18 - 18)  SpO2: 97% (22 Nov 2023 05:16) (96% - 99%)    Parameters below as of 22 Nov 2023 05:16  Patient On (Oxygen Delivery Method): room air    
Vital Signs Last 24 Hrs  T(C): 36.6 (29 Nov 2023 10:44), Max: 36.6 (29 Nov 2023 10:44)  T(F): 97.9 (29 Nov 2023 10:44), Max: 97.9 (29 Nov 2023 10:44)  HR: 96 (29 Nov 2023 10:44) (96 - 96)  BP: 119/72 (29 Nov 2023 10:44) (119/72 - 119/72)  BP(mean): --  RR: 18 (29 Nov 2023 10:44) (18 - 18)  SpO2: 96% (29 Nov 2023 10:44) (96% - 96%)    Parameters below as of 29 Nov 2023 10:44  Patient On (Oxygen Delivery Method): room air    
Vital Signs Last 24 Hrs  T(C): 36.7 (23 Nov 2023 05:23), Max: 36.7 (23 Nov 2023 05:23)  T(F): 98.1 (23 Nov 2023 05:23), Max: 98.1 (23 Nov 2023 05:23)  HR: 74 (23 Nov 2023 05:23) (74 - 86)  BP: 120/69 (23 Nov 2023 05:23) (120/69 - 133/83)  BP(mean): --  RR: 18 (23 Nov 2023 05:23) (18 - 18)  SpO2: 96% (23 Nov 2023 05:23) (96% - 98%)    Parameters below as of 23 Nov 2023 05:23  Patient On (Oxygen Delivery Method): room air    
Vital Signs Last 24 Hrs  T(C): --  T(F): --  HR: 78 (25 Nov 2023 22:44) (78 - 78)  BP: 119/64 (25 Nov 2023 22:44) (119/64 - 119/64)  BP(mean): --  RR: 18 (25 Nov 2023 22:44) (18 - 18)  SpO2: 96% (25 Nov 2023 22:44) (96% - 96%)    Parameters below as of 25 Nov 2023 22:44  Patient On (Oxygen Delivery Method): room air    
Vital Signs Last 24 Hrs  T(C): 36.7 (24 Nov 2023 06:43), Max: 36.7 (24 Nov 2023 06:43)  T(F): 98 (24 Nov 2023 06:43), Max: 98 (24 Nov 2023 06:43)  HR: 75 (24 Nov 2023 06:43) (66 - 75)  BP: 98/61 (24 Nov 2023 06:43) (98/61 - 108/63)  BP(mean): --  RR: 18 (24 Nov 2023 06:43) (18 - 18)  SpO2: 98% (24 Nov 2023 06:43) (98% - 99%)    Parameters below as of 24 Nov 2023 06:43  Patient On (Oxygen Delivery Method): room air    
Vital Signs Last 24 Hrs  T(C): --  T(F): --  HR: --  BP: --  BP(mean): --  RR: --  SpO2: --

## 2023-11-29 NOTE — BH CONSULTATION LIAISON PROGRESS NOTE - NSBHPSYCHOLCOGORIENT_PSY_A_CORE
Oriented to time, place, person, situation
Unable to assess
Unable to assess
Oriented to time, place, person, situation
Unable to assess
Unable to assess

## 2023-11-29 NOTE — BH CONSULTATION LIAISON PROGRESS NOTE - NSBHATTESTTYPEVISIT_PSY_A_CORE
Attending Only
Attending with Resident/Fellow/Student
Attending Only

## 2023-11-29 NOTE — BH CONSULTATION LIAISON PROGRESS NOTE - NSBHFUPINTERVALCCFT_PSY_A_CORE
None
I feel bad.
Sammy Escalante
PT ran down the hallway refusing to speak with the  on the phone.  
I don't want to talk much 
None

## 2023-11-29 NOTE — BH CONSULTATION LIAISON PROGRESS NOTE - NSBHFUPREASONCONS_PSY_A_CORE
suicidality/med management
suicidality/med management
suicidality

## 2023-11-29 NOTE — BH CONSULTATION LIAISON PROGRESS NOTE - CURRENT MEDICATION
MEDICATIONS  (STANDING):  dextrose 5%. 1000 milliLiter(s) (100 mL/Hr) IV Continuous <Continuous>  dextrose 5%. 1000 milliLiter(s) (50 mL/Hr) IV Continuous <Continuous>  dextrose 50% Injectable 25 Gram(s) IV Push once  dextrose 50% Injectable 12.5 Gram(s) IV Push once  dextrose 50% Injectable 25 Gram(s) IV Push once  glucagon  Injectable 1 milliGRAM(s) IntraMuscular once  insulin glargine Injectable (LANTUS) 15 Unit(s) SubCutaneous at bedtime  insulin lispro (ADMELOG) corrective regimen sliding scale   SubCutaneous three times a day before meals  insulin lispro (ADMELOG) corrective regimen sliding scale   SubCutaneous at bedtime  insulin lispro Injectable (ADMELOG) 6 Unit(s) SubCutaneous three times a day before meals    MEDICATIONS  (PRN):  acetaminophen     Tablet .. 650 milliGRAM(s) Oral every 6 hours PRN Temp greater or equal to 38C (100.4F), Mild Pain (1 - 3)  aluminum hydroxide/magnesium hydroxide/simethicone Suspension 30 milliLiter(s) Oral every 4 hours PRN Dyspepsia  dextrose Oral Gel 15 Gram(s) Oral once PRN Blood Glucose LESS THAN 70 milliGRAM(s)/deciliter  melatonin 3 milliGRAM(s) Oral at bedtime PRN Insomnia  ondansetron Injectable 4 milliGRAM(s) IV Push every 8 hours PRN Nausea and/or Vomiting  
MEDICATIONS  (STANDING):  dextrose 5%. 1000 milliLiter(s) (100 mL/Hr) IV Continuous <Continuous>  dextrose 5%. 1000 milliLiter(s) (50 mL/Hr) IV Continuous <Continuous>  dextrose 50% Injectable 25 Gram(s) IV Push once  dextrose 50% Injectable 25 Gram(s) IV Push once  dextrose 50% Injectable 12.5 Gram(s) IV Push once  glucagon  Injectable 1 milliGRAM(s) IntraMuscular once  insulin glargine Injectable (LANTUS) 15 Unit(s) SubCutaneous at bedtime  insulin lispro (ADMELOG) corrective regimen sliding scale   SubCutaneous at bedtime  insulin lispro (ADMELOG) corrective regimen sliding scale   SubCutaneous three times a day before meals  insulin lispro Injectable (ADMELOG) 6 Unit(s) SubCutaneous three times a day before meals    MEDICATIONS  (PRN):  acetaminophen     Tablet .. 650 milliGRAM(s) Oral every 6 hours PRN Temp greater or equal to 38C (100.4F), Mild Pain (1 - 3)  aluminum hydroxide/magnesium hydroxide/simethicone Suspension 30 milliLiter(s) Oral every 4 hours PRN Dyspepsia  dextrose Oral Gel 15 Gram(s) Oral once PRN Blood Glucose LESS THAN 70 milliGRAM(s)/deciliter  melatonin 3 milliGRAM(s) Oral at bedtime PRN Insomnia  ondansetron Injectable 4 milliGRAM(s) IV Push every 8 hours PRN Nausea and/or Vomiting  
MEDICATIONS  (STANDING):  dextrose 5%. 1000 milliLiter(s) (100 mL/Hr) IV Continuous <Continuous>  dextrose 5%. 1000 milliLiter(s) (50 mL/Hr) IV Continuous <Continuous>  dextrose 50% Injectable 25 Gram(s) IV Push once  dextrose 50% Injectable 25 Gram(s) IV Push once  dextrose 50% Injectable 12.5 Gram(s) IV Push once  glucagon  Injectable 1 milliGRAM(s) IntraMuscular once  insulin glargine Injectable (LANTUS) 18 Unit(s) SubCutaneous at bedtime  insulin lispro (ADMELOG) corrective regimen sliding scale   SubCutaneous at bedtime  insulin lispro (ADMELOG) corrective regimen sliding scale   SubCutaneous three times a day before meals  insulin lispro Injectable (ADMELOG) 6 Unit(s) SubCutaneous before dinner  insulin lispro Injectable (ADMELOG) 6 Unit(s) SubCutaneous before breakfast  insulin lispro Injectable (ADMELOG) 8 Unit(s) SubCutaneous before lunch  QUEtiapine 200 milliGRAM(s) Oral at bedtime    MEDICATIONS  (PRN):  acetaminophen     Tablet .. 650 milliGRAM(s) Oral every 6 hours PRN Temp greater or equal to 38C (100.4F), Mild Pain (1 - 3)  aluminum hydroxide/magnesium hydroxide/simethicone Suspension 30 milliLiter(s) Oral every 4 hours PRN Dyspepsia  dextrose Oral Gel 15 Gram(s) Oral once PRN Blood Glucose LESS THAN 70 milliGRAM(s)/deciliter  haloperidol    Injectable 5 milliGRAM(s) IntraMuscular every 6 hours PRN agitation  melatonin 3 milliGRAM(s) Oral at bedtime PRN Insomnia  ondansetron Injectable 4 milliGRAM(s) IV Push every 8 hours PRN Nausea and/or Vomiting  
MEDICATIONS  (STANDING):  dextrose 5%. 1000 milliLiter(s) (100 mL/Hr) IV Continuous <Continuous>  dextrose 5%. 1000 milliLiter(s) (50 mL/Hr) IV Continuous <Continuous>  dextrose 50% Injectable 25 Gram(s) IV Push once  dextrose 50% Injectable 12.5 Gram(s) IV Push once  dextrose 50% Injectable 25 Gram(s) IV Push once  glucagon  Injectable 1 milliGRAM(s) IntraMuscular once  insulin glargine Injectable (LANTUS) 18 Unit(s) SubCutaneous at bedtime  insulin lispro (ADMELOG) corrective regimen sliding scale   SubCutaneous three times a day before meals  insulin lispro (ADMELOG) corrective regimen sliding scale   SubCutaneous at bedtime  insulin lispro Injectable (ADMELOG) 6 Unit(s) SubCutaneous before breakfast  insulin lispro Injectable (ADMELOG) 8 Unit(s) SubCutaneous before dinner  insulin lispro Injectable (ADMELOG) 8 Unit(s) SubCutaneous before lunch  QUEtiapine 200 milliGRAM(s) Oral at bedtime    MEDICATIONS  (PRN):  acetaminophen     Tablet .. 650 milliGRAM(s) Oral every 6 hours PRN Temp greater or equal to 38C (100.4F), Mild Pain (1 - 3)  aluminum hydroxide/magnesium hydroxide/simethicone Suspension 30 milliLiter(s) Oral every 4 hours PRN Dyspepsia  dextrose Oral Gel 15 Gram(s) Oral once PRN Blood Glucose LESS THAN 70 milliGRAM(s)/deciliter  haloperidol    Injectable 5 milliGRAM(s) IntraMuscular every 6 hours PRN agitation  melatonin 3 milliGRAM(s) Oral at bedtime PRN Insomnia  ondansetron Injectable 4 milliGRAM(s) IV Push every 8 hours PRN Nausea and/or Vomiting  
MEDICATIONS  (STANDING):  dextrose 5%. 1000 milliLiter(s) (100 mL/Hr) IV Continuous <Continuous>  dextrose 5%. 1000 milliLiter(s) (50 mL/Hr) IV Continuous <Continuous>  dextrose 50% Injectable 25 Gram(s) IV Push once  dextrose 50% Injectable 12.5 Gram(s) IV Push once  dextrose 50% Injectable 25 Gram(s) IV Push once  glucagon  Injectable 1 milliGRAM(s) IntraMuscular once  insulin glargine Injectable (LANTUS) 18 Unit(s) SubCutaneous at bedtime  insulin lispro (ADMELOG) corrective regimen sliding scale   SubCutaneous at bedtime  insulin lispro (ADMELOG) corrective regimen sliding scale   SubCutaneous three times a day before meals  insulin lispro Injectable (ADMELOG) 6 Unit(s) SubCutaneous before dinner  insulin lispro Injectable (ADMELOG) 6 Unit(s) SubCutaneous before breakfast  insulin lispro Injectable (ADMELOG) 8 Unit(s) SubCutaneous before lunch  QUEtiapine 100 milliGRAM(s) Oral at bedtime    MEDICATIONS  (PRN):  acetaminophen     Tablet .. 650 milliGRAM(s) Oral every 6 hours PRN Temp greater or equal to 38C (100.4F), Mild Pain (1 - 3)  aluminum hydroxide/magnesium hydroxide/simethicone Suspension 30 milliLiter(s) Oral every 4 hours PRN Dyspepsia  dextrose Oral Gel 15 Gram(s) Oral once PRN Blood Glucose LESS THAN 70 milliGRAM(s)/deciliter  melatonin 3 milliGRAM(s) Oral at bedtime PRN Insomnia  ondansetron Injectable 4 milliGRAM(s) IV Push every 8 hours PRN Nausea and/or Vomiting  
MEDICATIONS  (STANDING):  dextrose 5%. 1000 milliLiter(s) (50 mL/Hr) IV Continuous <Continuous>  dextrose 5%. 1000 milliLiter(s) (100 mL/Hr) IV Continuous <Continuous>  dextrose 50% Injectable 25 Gram(s) IV Push once  dextrose 50% Injectable 12.5 Gram(s) IV Push once  dextrose 50% Injectable 25 Gram(s) IV Push once  glucagon  Injectable 1 milliGRAM(s) IntraMuscular once  insulin glargine Injectable (LANTUS) 18 Unit(s) SubCutaneous at bedtime  insulin lispro (ADMELOG) corrective regimen sliding scale   SubCutaneous three times a day before meals  insulin lispro (ADMELOG) corrective regimen sliding scale   SubCutaneous at bedtime  insulin lispro Injectable (ADMELOG) 6 Unit(s) SubCutaneous before breakfast  insulin lispro Injectable (ADMELOG) 10 Unit(s) SubCutaneous before lunch  insulin lispro Injectable (ADMELOG) 10 Unit(s) SubCutaneous before dinner  QUEtiapine 200 milliGRAM(s) Oral at bedtime    MEDICATIONS  (PRN):  acetaminophen     Tablet .. 650 milliGRAM(s) Oral every 6 hours PRN Temp greater or equal to 38C (100.4F), Mild Pain (1 - 3)  aluminum hydroxide/magnesium hydroxide/simethicone Suspension 30 milliLiter(s) Oral every 4 hours PRN Dyspepsia  dextrose Oral Gel 15 Gram(s) Oral once PRN Blood Glucose LESS THAN 70 milliGRAM(s)/deciliter  haloperidol    Injectable 5 milliGRAM(s) IntraMuscular every 6 hours PRN agitation  melatonin 3 milliGRAM(s) Oral at bedtime PRN Insomnia  ondansetron Injectable 4 milliGRAM(s) IV Push every 8 hours PRN Nausea and/or Vomiting

## 2023-11-29 NOTE — BH CONSULTATION LIAISON PROGRESS NOTE - NSBHCONSULTRECOMMENDOTHER_PSY_A_CORE FT
Adjust Seroquel to 300mg p.o. at bedtime  Haldol 5 mg PO/IM/IV q 6Hrs prn agitation if Qtc < 500  Obtain EKG for Qtc monitoring

## 2023-11-29 NOTE — PROGRESS NOTE ADULT - PROBLEM SELECTOR PROBLEM 2
Acute hyperglycemia
HLD (hyperlipidemia)
Suicidal ideation
Acute hyperglycemia
Acute hyperglycemia
HTN (hypertension)
Suicidal ideation
Suicidal ideation

## 2023-11-29 NOTE — PROGRESS NOTE ADULT - PROBLEM SELECTOR PLAN 2
On admission Glucose of 530, improved now, not in DKA, A1c 13%  - Endo recs appreciated- Lantus 18units and Lispro 0units before breakfast and dinner and 10 units before lunch w/ ISS

## 2023-11-29 NOTE — BH CONSULTATION LIAISON PROGRESS NOTE - NSBHPTASSESSDT_PSY_A_CORE
22-Nov-2023 13:47
24-Nov-2023 17:28
26-Nov-2023 14:01
27-Nov-2023 15:55
23-Nov-2023 13:13
29-Nov-2023 16:10

## 2023-11-29 NOTE — PROGRESS NOTE ADULT - REASON FOR ADMISSION
AMS, Hyperglycemia

## 2023-11-29 NOTE — BH CONSULTATION LIAISON PROGRESS NOTE - NSBHASSESSMENTFT_PSY_ALL_CORE
This is a 53-y.o. HF patient, Telugu-speaking patient with reported history of schizophrenia, diabetes presented due to possible psychiatric complaints/AMS. Patient presenting with disorganized behavior and very uncooperative and guarded, now mild improvement seen.

## 2023-11-30 ENCOUNTER — INPATIENT (INPATIENT)
Facility: HOSPITAL | Age: 53
LOS: 18 days | Discharge: HOME | DRG: 885 | End: 2023-12-19
Attending: PSYCHIATRY & NEUROLOGY | Admitting: PSYCHIATRY & NEUROLOGY
Payer: SELF-PAY

## 2023-11-30 VITALS
SYSTOLIC BLOOD PRESSURE: 124 MMHG | TEMPERATURE: 98 F | HEART RATE: 78 BPM | DIASTOLIC BLOOD PRESSURE: 74 MMHG | RESPIRATION RATE: 16 BRPM

## 2023-11-30 VITALS
OXYGEN SATURATION: 99 % | DIASTOLIC BLOOD PRESSURE: 71 MMHG | HEART RATE: 94 BPM | SYSTOLIC BLOOD PRESSURE: 104 MMHG | TEMPERATURE: 99 F | RESPIRATION RATE: 18 BRPM

## 2023-11-30 PROBLEM — F20.9 SCHIZOPHRENIA, UNSPECIFIED: Chronic | Status: ACTIVE | Noted: 2023-11-21

## 2023-11-30 LAB
GLUCOSE BLDC GLUCOMTR-MCNC: 163 MG/DL — HIGH (ref 70–99)
GLUCOSE BLDC GLUCOMTR-MCNC: 163 MG/DL — HIGH (ref 70–99)
GLUCOSE BLDC GLUCOMTR-MCNC: 247 MG/DL — HIGH (ref 70–99)
GLUCOSE BLDC GLUCOMTR-MCNC: 247 MG/DL — HIGH (ref 70–99)
GLUCOSE BLDC GLUCOMTR-MCNC: 302 MG/DL — HIGH (ref 70–99)
GLUCOSE BLDC GLUCOMTR-MCNC: 302 MG/DL — HIGH (ref 70–99)

## 2023-11-30 PROCEDURE — 82010 KETONE BODYS QUAN: CPT

## 2023-11-30 PROCEDURE — 82330 ASSAY OF CALCIUM: CPT

## 2023-11-30 PROCEDURE — 80307 DRUG TEST PRSMV CHEM ANLYZR: CPT

## 2023-11-30 PROCEDURE — 82607 VITAMIN B-12: CPT

## 2023-11-30 PROCEDURE — 82746 ASSAY OF FOLIC ACID SERUM: CPT

## 2023-11-30 PROCEDURE — 85025 COMPLETE CBC W/AUTO DIFF WBC: CPT

## 2023-11-30 PROCEDURE — 83605 ASSAY OF LACTIC ACID: CPT

## 2023-11-30 PROCEDURE — 84443 ASSAY THYROID STIM HORMONE: CPT

## 2023-11-30 PROCEDURE — 85014 HEMATOCRIT: CPT

## 2023-11-30 PROCEDURE — 83036 HEMOGLOBIN GLYCOSYLATED A1C: CPT

## 2023-11-30 PROCEDURE — 87635 SARS-COV-2 COVID-19 AMP PRB: CPT

## 2023-11-30 PROCEDURE — 82947 ASSAY GLUCOSE BLOOD QUANT: CPT

## 2023-11-30 PROCEDURE — 93005 ELECTROCARDIOGRAM TRACING: CPT

## 2023-11-30 PROCEDURE — 84295 ASSAY OF SERUM SODIUM: CPT

## 2023-11-30 PROCEDURE — 84702 CHORIONIC GONADOTROPIN TEST: CPT

## 2023-11-30 PROCEDURE — 84132 ASSAY OF SERUM POTASSIUM: CPT

## 2023-11-30 PROCEDURE — 84439 ASSAY OF FREE THYROXINE: CPT

## 2023-11-30 PROCEDURE — 80053 COMPREHEN METABOLIC PANEL: CPT

## 2023-11-30 PROCEDURE — 80048 BASIC METABOLIC PNL TOTAL CA: CPT

## 2023-11-30 PROCEDURE — 82435 ASSAY OF BLOOD CHLORIDE: CPT

## 2023-11-30 PROCEDURE — 99285 EMERGENCY DEPT VISIT HI MDM: CPT

## 2023-11-30 PROCEDURE — 99239 HOSP IP/OBS DSCHRG MGMT >30: CPT

## 2023-11-30 PROCEDURE — 85018 HEMOGLOBIN: CPT

## 2023-11-30 PROCEDURE — 82803 BLOOD GASES ANY COMBINATION: CPT

## 2023-11-30 PROCEDURE — 82962 GLUCOSE BLOOD TEST: CPT

## 2023-11-30 RX ORDER — INSULIN LISPRO 100/ML
10 VIAL (ML) SUBCUTANEOUS
Refills: 0 | Status: DISCONTINUED | OUTPATIENT
Start: 2023-11-30 | End: 2023-11-30

## 2023-11-30 RX ORDER — SODIUM CHLORIDE 9 MG/ML
1000 INJECTION, SOLUTION INTRAVENOUS
Refills: 0 | Status: DISCONTINUED | OUTPATIENT
Start: 2023-11-30 | End: 2023-11-30

## 2023-11-30 RX ORDER — DEXTROSE 50 % IN WATER 50 %
25 SYRINGE (ML) INTRAVENOUS ONCE
Refills: 0 | Status: DISCONTINUED | OUTPATIENT
Start: 2023-11-30 | End: 2023-11-30

## 2023-11-30 RX ORDER — SODIUM CHLORIDE 9 MG/ML
1000 INJECTION, SOLUTION INTRAVENOUS
Refills: 0 | Status: DISCONTINUED | OUTPATIENT
Start: 2023-11-30 | End: 2023-12-19

## 2023-11-30 RX ORDER — DEXTROSE 50 % IN WATER 50 %
12.5 SYRINGE (ML) INTRAVENOUS ONCE
Refills: 0 | Status: DISCONTINUED | OUTPATIENT
Start: 2023-11-30 | End: 2023-12-19

## 2023-11-30 RX ORDER — INSULIN LISPRO 100/ML
10 VIAL (ML) SUBCUTANEOUS
Refills: 0 | Status: DISCONTINUED | OUTPATIENT
Start: 2023-12-01 | End: 2023-12-19

## 2023-11-30 RX ORDER — DEXTROSE 50 % IN WATER 50 %
15 SYRINGE (ML) INTRAVENOUS ONCE
Refills: 0 | Status: DISCONTINUED | OUTPATIENT
Start: 2023-11-30 | End: 2023-11-30

## 2023-11-30 RX ORDER — QUETIAPINE FUMARATE 200 MG/1
50 TABLET, FILM COATED ORAL DAILY
Refills: 0 | Status: DISCONTINUED | OUTPATIENT
Start: 2023-11-30 | End: 2023-12-03

## 2023-11-30 RX ORDER — INSULIN LISPRO 100/ML
VIAL (ML) SUBCUTANEOUS AT BEDTIME
Refills: 0 | Status: DISCONTINUED | OUTPATIENT
Start: 2023-11-30 | End: 2023-11-30

## 2023-11-30 RX ORDER — INSULIN GLARGINE 100 [IU]/ML
18 INJECTION, SOLUTION SUBCUTANEOUS AT BEDTIME
Refills: 0 | Status: DISCONTINUED | OUTPATIENT
Start: 2023-11-30 | End: 2023-11-30

## 2023-11-30 RX ORDER — INSULIN GLARGINE 100 [IU]/ML
18 INJECTION, SOLUTION SUBCUTANEOUS AT BEDTIME
Refills: 0 | Status: DISCONTINUED | OUTPATIENT
Start: 2023-11-30 | End: 2023-12-19

## 2023-11-30 RX ORDER — DIPHENHYDRAMINE HCL 50 MG
50 CAPSULE ORAL EVERY 6 HOURS
Refills: 0 | Status: DISCONTINUED | OUTPATIENT
Start: 2023-11-30 | End: 2023-12-19

## 2023-11-30 RX ORDER — DEXTROSE 50 % IN WATER 50 %
25 SYRINGE (ML) INTRAVENOUS ONCE
Refills: 0 | Status: DISCONTINUED | OUTPATIENT
Start: 2023-11-30 | End: 2023-12-19

## 2023-11-30 RX ORDER — INSULIN LISPRO 100/ML
6 VIAL (ML) SUBCUTANEOUS
Refills: 0 | Status: DISCONTINUED | OUTPATIENT
Start: 2023-12-01 | End: 2023-12-19

## 2023-11-30 RX ORDER — HALOPERIDOL DECANOATE 100 MG/ML
5 INJECTION INTRAMUSCULAR EVERY 6 HOURS
Refills: 0 | Status: DISCONTINUED | OUTPATIENT
Start: 2023-11-30 | End: 2023-12-19

## 2023-11-30 RX ORDER — ACETAMINOPHEN 500 MG
650 TABLET ORAL EVERY 6 HOURS
Refills: 0 | Status: DISCONTINUED | OUTPATIENT
Start: 2023-11-30 | End: 2023-12-19

## 2023-11-30 RX ORDER — DEXTROSE 50 % IN WATER 50 %
15 SYRINGE (ML) INTRAVENOUS ONCE
Refills: 0 | Status: DISCONTINUED | OUTPATIENT
Start: 2023-11-30 | End: 2023-12-19

## 2023-11-30 RX ORDER — ONDANSETRON 8 MG/1
4 TABLET, FILM COATED ORAL EVERY 6 HOURS
Refills: 0 | Status: DISCONTINUED | OUTPATIENT
Start: 2023-11-30 | End: 2023-12-19

## 2023-11-30 RX ORDER — DEXTROSE 50 % IN WATER 50 %
12.5 SYRINGE (ML) INTRAVENOUS ONCE
Refills: 0 | Status: DISCONTINUED | OUTPATIENT
Start: 2023-11-30 | End: 2023-11-30

## 2023-11-30 RX ORDER — MAGNESIUM HYDROXIDE 400 MG/1
30 TABLET, CHEWABLE ORAL DAILY
Refills: 0 | Status: DISCONTINUED | OUTPATIENT
Start: 2023-11-30 | End: 2023-12-19

## 2023-11-30 RX ORDER — LANOLIN ALCOHOL/MO/W.PET/CERES
5 CREAM (GRAM) TOPICAL AT BEDTIME
Refills: 0 | Status: DISCONTINUED | OUTPATIENT
Start: 2023-11-30 | End: 2023-12-19

## 2023-11-30 RX ORDER — GLUCAGON INJECTION, SOLUTION 0.5 MG/.1ML
1 INJECTION, SOLUTION SUBCUTANEOUS ONCE
Refills: 0 | Status: DISCONTINUED | OUTPATIENT
Start: 2023-11-30 | End: 2023-12-19

## 2023-11-30 RX ORDER — INSULIN LISPRO 100/ML
VIAL (ML) SUBCUTANEOUS
Refills: 0 | Status: DISCONTINUED | OUTPATIENT
Start: 2023-11-30 | End: 2023-12-19

## 2023-11-30 RX ORDER — QUETIAPINE FUMARATE 200 MG/1
200 TABLET, FILM COATED ORAL AT BEDTIME
Refills: 0 | Status: DISCONTINUED | OUTPATIENT
Start: 2023-11-30 | End: 2023-12-19

## 2023-11-30 RX ORDER — GLUCAGON INJECTION, SOLUTION 0.5 MG/.1ML
1 INJECTION, SOLUTION SUBCUTANEOUS ONCE
Refills: 0 | Status: DISCONTINUED | OUTPATIENT
Start: 2023-11-30 | End: 2023-11-30

## 2023-11-30 RX ADMIN — Medication 1: at 09:24

## 2023-11-30 RX ADMIN — Medication 650 MILLIGRAM(S): at 21:11

## 2023-11-30 RX ADMIN — Medication 10 UNIT(S): at 17:51

## 2023-11-30 RX ADMIN — HALOPERIDOL DECANOATE 5 MILLIGRAM(S): 100 INJECTION INTRAMUSCULAR at 11:36

## 2023-11-30 RX ADMIN — Medication 650 MILLIGRAM(S): at 22:10

## 2023-11-30 RX ADMIN — Medication 650 MILLIGRAM(S): at 10:39

## 2023-11-30 RX ADMIN — QUETIAPINE FUMARATE 200 MILLIGRAM(S): 200 TABLET, FILM COATED ORAL at 21:11

## 2023-11-30 RX ADMIN — Medication 2: at 21:14

## 2023-11-30 RX ADMIN — Medication 5 MILLIGRAM(S): at 21:10

## 2023-11-30 RX ADMIN — Medication 650 MILLIGRAM(S): at 09:40

## 2023-11-30 RX ADMIN — INSULIN GLARGINE 18 UNIT(S): 100 INJECTION, SOLUTION SUBCUTANEOUS at 21:13

## 2023-11-30 RX ADMIN — Medication 6 UNIT(S): at 09:25

## 2023-11-30 NOTE — BH PATIENT PROFILE - VISION (WITH CORRECTIVE LENSES IF THE PATIENT USUALLY WEARS THEM):
pt refused also cognitively impaired/Normal vision: sees adequately in most situations; can see medication labels, newsprint

## 2023-11-30 NOTE — BH PATIENT PROFILE - FALL HARM RISK - HARM RISK INTERVENTIONS

## 2023-11-30 NOTE — BH PATIENT PROFILE - FUNCTIONAL ASSESSMENT - BASIC MOBILITY 6.
3-calculated by average/Not able to assess (calculate score using [facility] averaging method)

## 2023-11-30 NOTE — BH PATIENT PROFILE - HOME MEDICATIONS
QUEtiapine 200 mg oral tablet , 1 tab(s) orally once a day (at bedtime)  insulin glargine 100 units/mL subcutaneous solution , 18 unit(s) subcutaneous once a day (at bedtime)  insulin lispro 100 units/mL injectable solution , 10 international unit(s) subcutaneous once a day before dinner  insulin lispro 100 units/mL injectable solution , 10 unit(s) subcutaneous once a day before lunch  insulin lispro 100 units/mL injectable solution , 6 unit(s) subcutaneous once a day before breakfast  insulin lispro 100 units/mL injectable solution , subcutaneous once a day (at bedtime) 0 Unit(s) if Glucose 0 - 250  1 Unit(s) if Glucose 251 - 300  2 Unit(s) if Glucose 301 - 350  3 Unit(s) if Glucose 351 - 400  4 Unit(s) if Glucose Greater Than 400  insulin lispro 100 units/mL injectable solution , subcutaneous 3 times a day (before meals) 1 Unit(s) if Glucose 151 - 200  2 Unit(s) if Glucose 201 - 250  3 Unit(s) if Glucose 251 - 300  4 Unit(s) if Glucose 301 - 350  5 Unit(s) if Glucose 351 - 400  6 Unit(s) if Glucose Greater Than 400

## 2023-11-30 NOTE — BH SOCIAL WORK INITIAL PSYCHOSOCIAL EVALUATION - NSBHHOUSECONTACTFT_PSY_ALL_CORE
[facility] [id number] [address] [facility] [address] [address] [facility] [address]

## 2023-11-30 NOTE — DISCHARGE NOTE NURSING/CASE MANAGEMENT/SOCIAL WORK - NSDCPEFALRISK_GEN_ALL_CORE
For information on Fall & Injury Prevention, visit: [email or web address] OR  [email or web address] OR  https://www.cdc.gov/steadi/patient.html

## 2023-11-30 NOTE — DISCHARGE NOTE NURSING/CASE MANAGEMENT/SOCIAL WORK - PATIENT PORTAL LINK FT
You can access the FollowMyHealth Patient Portal offered by [facility] by registering at the following website: [email or web address]. By joining [employer]’s FollowMyHealth portal, you will also be able to view your health information using other applications (apps) compatible with our system.

## 2023-11-30 NOTE — BH SOCIAL WORK INITIAL PSYCHOSOCIAL EVALUATION - OTHER PAST PSYCHIATRIC HISTORY (INCLUDE DETAILS REGARDING ONSET, COURSE OF ILLNESS, INPATIENT/OUTPATIENT TREATMENT)
Note in progress.  Patient is an  female who was admitted to [facility] due to symptoms of psychosis. Encounter took place in [place]. Pt decline using  but was agreeable to speaking with writer who is fluent in [place]. Pt resides in [address] [place] [address], Hx of schizophrenia. Pt does not know why she is here, however based on chart review she was found wandering and uncooperative.  Patient is an  female who was admitted to [facility] due to symptoms of psychosis. Encounter took place in [place]. Pt decline using  but was agreeable to speaking with writer who is fluent in [place]. Pt resides in [address] [place] [address], Hx of schizophrenia. Pt does not know why she is here, however based on chart review she was found wandering and uncooperative.  Patient is an  female who was admitted to [facility] due to symptoms of psychosis. Encounter took place in Hungarian. Pt decline using  but was agreeable to speaking with writer who is fluent in Hungarian. Pt resides in [address] [place] [address], Hx of schizophrenia. Pt does not know why she is here, however based on chart review she was found wandering and uncooperative.

## 2023-11-30 NOTE — BH CHART NOTE - NSEVENTNOTEFT_PSY_ALL_CORE
Pt seen. Orders written. No constant observation necessary for hypersexuality, suicidality, aggression, falls, self-harm, or elopement.

## 2023-12-01 LAB
GLUCOSE BLDC GLUCOMTR-MCNC: 250 MG/DL — HIGH (ref 70–99)
GLUCOSE BLDC GLUCOMTR-MCNC: 250 MG/DL — HIGH (ref 70–99)
GLUCOSE BLDC GLUCOMTR-MCNC: 342 MG/DL — HIGH (ref 70–99)
GLUCOSE BLDC GLUCOMTR-MCNC: 342 MG/DL — HIGH (ref 70–99)
GLUCOSE BLDC GLUCOMTR-MCNC: 364 MG/DL — HIGH (ref 70–99)
GLUCOSE BLDC GLUCOMTR-MCNC: 364 MG/DL — HIGH (ref 70–99)

## 2023-12-01 PROCEDURE — 99223 1ST HOSP IP/OBS HIGH 75: CPT

## 2023-12-01 RX ADMIN — Medication 50 MILLIGRAM(S): at 16:45

## 2023-12-01 RX ADMIN — Medication 5 MILLIGRAM(S): at 21:11

## 2023-12-01 RX ADMIN — Medication 650 MILLIGRAM(S): at 21:12

## 2023-12-01 RX ADMIN — Medication 5: at 21:12

## 2023-12-01 RX ADMIN — Medication 4: at 16:55

## 2023-12-01 RX ADMIN — Medication 1 MILLIGRAM(S): at 16:44

## 2023-12-01 RX ADMIN — Medication 650 MILLIGRAM(S): at 13:50

## 2023-12-01 RX ADMIN — QUETIAPINE FUMARATE 200 MILLIGRAM(S): 200 TABLET, FILM COATED ORAL at 21:11

## 2023-12-01 RX ADMIN — Medication 650 MILLIGRAM(S): at 14:30

## 2023-12-01 RX ADMIN — HALOPERIDOL DECANOATE 5 MILLIGRAM(S): 100 INJECTION INTRAMUSCULAR at 16:46

## 2023-12-01 RX ADMIN — INSULIN GLARGINE 18 UNIT(S): 100 INJECTION, SOLUTION SUBCUTANEOUS at 21:13

## 2023-12-01 RX ADMIN — Medication 2: at 12:18

## 2023-12-01 RX ADMIN — Medication 650 MILLIGRAM(S): at 22:00

## 2023-12-01 RX ADMIN — QUETIAPINE FUMARATE 50 MILLIGRAM(S): 200 TABLET, FILM COATED ORAL at 10:46

## 2023-12-01 NOTE — BH INPATIENT PSYCHIATRY ASSESSMENT NOTE - CURRENT MEDICATION
MEDICATIONS  (STANDING):  dextrose 5%. 1000 milliLiter(s) (50 mL/Hr) IV Continuous <Continuous>  dextrose 5%. 1000 milliLiter(s) (100 mL/Hr) IV Continuous <Continuous>  dextrose 50% Injectable 25 Gram(s) IV Push once  dextrose 50% Injectable 12.5 Gram(s) IV Push once  dextrose 50% Injectable 25 Gram(s) IV Push once  glucagon  Injectable 1 milliGRAM(s) IntraMuscular once  insulin glargine Injectable (LANTUS) 18 Unit(s) SubCutaneous at bedtime  insulin lispro (ADMELOG) corrective regimen sliding scale   SubCutaneous three times a day before meals  insulin lispro Injectable (ADMELOG) 6 Unit(s) SubCutaneous before breakfast  insulin lispro Injectable (ADMELOG) 10 Unit(s) SubCutaneous before dinner  insulin lispro Injectable (ADMELOG) 10 Unit(s) SubCutaneous before lunch  melatonin. 5 milliGRAM(s) Oral at bedtime  QUEtiapine 50 milliGRAM(s) Oral daily  QUEtiapine 200 milliGRAM(s) Oral at bedtime    MEDICATIONS  (PRN):  acetaminophen     Tablet .. 650 milliGRAM(s) Oral every 6 hours PRN Moderate Pain (4 - 6)  aluminum hydroxide/magnesium hydroxide/simethicone Suspension 30 milliLiter(s) Oral every 6 hours PRN Dyspepsia  dextrose Oral Gel 15 Gram(s) Oral once PRN Blood Glucose LESS THAN 70 milliGRAM(s)/deciliter  diphenhydrAMINE 50 milliGRAM(s) Oral every 6 hours PRN agitation  haloperidol     Tablet 5 milliGRAM(s) Oral every 6 hours PRN agitation  LORazepam     Tablet 1 milliGRAM(s) Oral every 6 hours PRN agitation  magnesium hydroxide Suspension 30 milliLiter(s) Oral daily PRN Constipation  ondansetron Injectable 4 milliGRAM(s) IntraMuscular every 6 hours PRN nausea4   MEDICATIONS  (STANDING):  dextrose 5%. 1000 milliLiter(s) (50 mL/Hr) IV Continuous <Continuous>  dextrose 5%. 1000 milliLiter(s) (100 mL/Hr) IV Continuous <Continuous>  dextrose 50% Injectable 12.5 Gram(s) IV Push once  dextrose 50% Injectable 25 Gram(s) IV Push once  dextrose 50% Injectable 25 Gram(s) IV Push once  glucagon  Injectable 1 milliGRAM(s) IntraMuscular once  insulin glargine Injectable (LANTUS) 18 Unit(s) SubCutaneous at bedtime  insulin lispro (ADMELOG) corrective regimen sliding scale   SubCutaneous three times a day before meals  insulin lispro Injectable (ADMELOG) 6 Unit(s) SubCutaneous before breakfast  insulin lispro Injectable (ADMELOG) 10 Unit(s) SubCutaneous before dinner  insulin lispro Injectable (ADMELOG) 10 Unit(s) SubCutaneous before lunch  melatonin. 5 milliGRAM(s) Oral at bedtime  QUEtiapine 50 milliGRAM(s) Oral daily  QUEtiapine 200 milliGRAM(s) Oral at bedtime    MEDICATIONS  (PRN):  acetaminophen     Tablet .. 650 milliGRAM(s) Oral every 6 hours PRN Moderate Pain (4 - 6)  aluminum hydroxide/magnesium hydroxide/simethicone Suspension 30 milliLiter(s) Oral every 6 hours PRN Dyspepsia  dextrose Oral Gel 15 Gram(s) Oral once PRN Blood Glucose LESS THAN 70 milliGRAM(s)/deciliter  diphenhydrAMINE 50 milliGRAM(s) Oral every 6 hours PRN agitation  haloperidol     Tablet 5 milliGRAM(s) Oral every 6 hours PRN agitation  LORazepam     Tablet 1 milliGRAM(s) Oral every 6 hours PRN agitation  magnesium hydroxide Suspension 30 milliLiter(s) Oral daily PRN Constipation  ondansetron Injectable 4 milliGRAM(s) IntraMuscular every 6 hours PRN nausea4   MEDICATIONS  (STANDING):  dextrose 5%. 1000 milliLiter(s) (50 mL/Hr) IV Continuous <Continuous>  dextrose 5%. 1000 milliLiter(s) (100 mL/Hr) IV Continuous <Continuous>  dextrose 50% Injectable 12.5 Gram(s) IV Push once  dextrose 50% Injectable 25 Gram(s) IV Push once  dextrose 50% Injectable 25 Gram(s) IV Push once  glucagon  Injectable 1 milliGRAM(s) IntraMuscular once  insulin glargine Injectable (LANTUS) 18 Unit(s) SubCutaneous at bedtime  insulin lispro (ADMELOG) corrective regimen sliding scale   SubCutaneous Before meals and at bedtime  insulin lispro Injectable (ADMELOG) 6 Unit(s) SubCutaneous before breakfast  insulin lispro Injectable (ADMELOG) 10 Unit(s) SubCutaneous before dinner  insulin lispro Injectable (ADMELOG) 10 Unit(s) SubCutaneous before lunch  melatonin. 5 milliGRAM(s) Oral at bedtime  QUEtiapine 125 milliGRAM(s) Oral daily  QUEtiapine 200 milliGRAM(s) Oral at bedtime    MEDICATIONS  (PRN):  acetaminophen     Tablet .. 650 milliGRAM(s) Oral every 6 hours PRN Moderate Pain (4 - 6)  aluminum hydroxide/magnesium hydroxide/simethicone Suspension 30 milliLiter(s) Oral every 6 hours PRN Dyspepsia  dextrose Oral Gel 15 Gram(s) Oral once PRN Blood Glucose LESS THAN 70 milliGRAM(s)/deciliter  diphenhydrAMINE 50 milliGRAM(s) Oral every 6 hours PRN agitation  haloperidol     Tablet 5 milliGRAM(s) Oral every 6 hours PRN agitation  magnesium hydroxide Suspension 30 milliLiter(s) Oral daily PRN Constipation  ondansetron Injectable 4 milliGRAM(s) IntraMuscular every 6 hours PRN nausea4

## 2023-12-01 NOTE — BH INPATIENT PSYCHIATRY ASSESSMENT NOTE - NSBHMETABOLIC_PSY_ALL_CORE_FT
BMI: BMI (kg/m2): 19.5 (11-20-23 @ 16:55)  HbA1c: A1C with Estimated Average Glucose Result: 13.4 % (11-20-23 @ 22:35)    Glucose: POCT Blood Glucose.: 247 mg/dL (11-30-23 @ 21:08)    BP: 124/74 (11-30-23 @ 14:26) (124/74 - 124/74)Vital Signs Last 24 Hrs  T(C): 36.5 (11-30-23 @ 14:26), Max: 36.5 (11-30-23 @ 14:26)  T(F): 97.7 (11-30-23 @ 14:26), Max: 97.7 (11-30-23 @ 14:26)  HR: 78 (11-30-23 @ 14:26) (78 - 78)  BP: 124/74 (11-30-23 @ 14:26) (124/74 - 124/74)  BP(mean): --  RR: 16 (11-30-23 @ 14:26) (16 - 16)  SpO2: --      Lipid Panel:  BMI: BMI (kg/m2): 19.5 (11-20-23 @ 16:55)  HbA1c: A1C with Estimated Average Glucose Result: 13.4 % (11-20-23 @ 22:35)    Glucose: POCT Blood Glucose.: 250 mg/dL (12-01-23 @ 12:16)    BP: 124/74 (11-30-23 @ 14:26) (124/74 - 124/74)Vital Signs Last 24 Hrs  T(C): 36.5 (11-30-23 @ 14:26), Max: 36.5 (11-30-23 @ 14:26)  T(F): 97.7 (11-30-23 @ 14:26), Max: 97.7 (11-30-23 @ 14:26)  HR: 78 (11-30-23 @ 14:26) (78 - 78)  BP: 124/74 (11-30-23 @ 14:26) (124/74 - 124/74)  BP(mean): --  RR: 16 (11-30-23 @ 14:26) (16 - 16)  SpO2: --      Lipid Panel:  BMI: BMI (kg/m2): 19.5 (11-20-23 @ 16:55)  HbA1c: A1C with Estimated Average Glucose Result: 13.4 % (11-20-23 @ 22:35)    Glucose: POCT Blood Glucose.: 103 mg/dL (12-19-23 @ 07:51)    BP: 117/80 (12-19-23 @ 08:25) (116/75 - 131/75)Vital Signs Last 24 Hrs  T(C): 37.1 (12-19-23 @ 08:25), Max: 37.2 (12-18-23 @ 16:30)  T(F): 98.8 (12-19-23 @ 08:25), Max: 98.9 (12-18-23 @ 16:30)  HR: 100 (12-19-23 @ 08:25) (93 - 100)  BP: 117/80 (12-19-23 @ 08:25) (117/80 - 131/75)  BP(mean): --  RR: --  SpO2: 99% (12-19-23 @ 08:25) (96% - 99%)      Lipid Panel:

## 2023-12-01 NOTE — BH INPATIENT PSYCHIATRY ASSESSMENT NOTE - NSCOMMENTSUICRISKFACT_PSY_ALL_CORE
Per chart review from notes prior to transfer, mixed reports of patient denying and endorsing suicidal ideation

## 2023-12-01 NOTE — BH INPATIENT PSYCHIATRY ASSESSMENT NOTE - RISK ASSESSMENT
Pt with reported hx of schizophrenia, unclear PPHx or medication adherence. Pt appeared to be acting bizarrely in the community, unwilling to engage with treatment team. Pt currently in shelter system. Possible family support from pt's sister

## 2023-12-01 NOTE — BH INPATIENT PSYCHIATRY ASSESSMENT NOTE - DESCRIPTION
Unknown - Per report, the patient is homeless. Pt's sister Cindy (Serbian Speaking) is listed as contact Unknown - Per report, the patient is homeless. Pt's sister Cindy (Estonian Speaking) is listed as contact Unknown - Per report, the patient is homeless. Pt's sister Cindy (Swedish Speaking) is listed as contact

## 2023-12-01 NOTE — BH INPATIENT PSYCHIATRY ASSESSMENT NOTE - NSREFERRALDIRECTTXFER_PSY_ALL_CORE
Outside Hospital ([facility] Outside Hospital ([facility] Outside Hospital ([facility]

## 2023-12-01 NOTE — BH INPATIENT PSYCHIATRY ASSESSMENT NOTE - OTHER PAST PSYCHIATRIC HISTORY (INCLUDE DETAILS REGARDING ONSET, COURSE OF ILLNESS, INPATIENT/OUTPATIENT TREATMENT)
53F Ukrainian speaking patient with reported history of schizophrenia, diabetes is presenting due to possible psychiatric complaints/AMS.     Previously,  the patient, she stated her cousin called the ambulance on her today because she was causing a "domestic issue".  She stated that she "thinks  her family called the ambulance because she talks too much". Reportedly she has not been taking her medications, however the patient did not know what medicine she is supposed to be on.  When asked if she wants to kill herself she says yes.  She does not elaborate on why or how she would do it.  She does admit to hearing voices she states that she hears people making noises and crying and making a mess.  She lives in a shelter.  Denies any recreational drug use.  No other complaints.    53F Maori speaking patient with reported history of schizophrenia, diabetes is presenting due to possible psychiatric complaints/AMS.     Previously,  the patient, she stated her cousin called the ambulance on her today because she was causing a "domestic issue".  She stated that she "thinks  her family called the ambulance because she talks too much". Reportedly she has not been taking her medications, however the patient did not know what medicine she is supposed to be on.  When asked if she wants to kill herself she says yes.  She does not elaborate on why or how she would do it.  She does admit to hearing voices she states that she hears people making noises and crying and making a mess.  She lives in a shelter.  Denies any recreational drug use.  No other complaints.    53F Icelandic speaking patient with reported history of schizophrenia, diabetes is presenting due to possible psychiatric complaints/AMS.     Previously,  the patient, she stated her cousin called the ambulance on her today because she was causing a "domestic issue".  She stated that she "thinks  her family called the ambulance because she talks too much". Reportedly she has not been taking her medications, however the patient did not know what medicine she is supposed to be on.  When asked if she wants to kill herself she says yes.  She does not elaborate on why or how she would do it.  She does admit to hearing voices she states that she hears people making noises and crying and making a mess.  She lives in a shelter.  Denies any recreational drug use.  No other complaints.

## 2023-12-01 NOTE — BH INPATIENT PSYCHIATRY ASSESSMENT NOTE - NSTXPSYCHOINTERMD_PSY_ALL_CORE
seroquel, speak with [facility] ACT Team seroquel, speak with [facility] ACT Team seroquel, speak with [facility] ACT Team

## 2023-12-01 NOTE — BH INPATIENT PSYCHIATRY ASSESSMENT NOTE - HPI (INCLUDE ILLNESS QUALITY, SEVERITY, DURATION, TIMING, CONTEXT, MODIFYING FACTORS, ASSOCIATED SIGNS AND SYMPTOMS)
12/1/23 - Pt unwilling to participate in interview with treatment team. Pt did not provide any additional information to add to HPI.    See below for HPI from chart note from transfer facility  "This is a 53-y.o. HF patient, Estonian-speaking, with reported history of schizophrenia, diabetes is presenting due to possible psychiatric complaints/AMS. Patient was found wandering and is not cooperative. Admitted for hyperglycemia. Consult requested to evaluate patient for bizarre  behavior.     Per the ED notes:  No phone contact number available. Previous doctor not in hospital currently. Hx obtained via chart review only. Need medication reconciliation.     Previously, per the patient, she stated her cousin called the ambulance on her today because she was causing a "domestic issue".  She stated that she "thinks  her family called the ambulance because she talks too much". Reportedly she has not been taking her medications, however the patient did not know what medicine she is supposed to be on.  When asked if she wants to kill herself she says yes.  She does not elaborate on why or how she would do it.  She does admit to hearing voices she states that she hears people making noises and crying and making a mess.  She lives in a shelter.  Denies any recreational drug use.  No other complaints.     Today, seen at bedside by psychiatry. The patient was refusing to speak with the team, turning over in bed and at times pretending to be asleep. She is stating that she does not want to talk.     Plan:  - One to one observation for possible SI  - B12/Folate, TSH w/ FT4, Head CT  -EKG for Qtc monitoring should neuroleptic agents need to be used  -PRN: Haldol 5 mg PO/IM/IV q 6Hrs PRN severe agitation if Qtc < 500  -Pt. may warrant admission to psychiatry once medically cleared, will continue  to evaluate and reassess" 12/1/23 - Pt unwilling to participate in interview with treatment team. Pt did not provide any additional information to add to HPI.    See below for HPI from chart note from transfer facility  "This is a 53-y.o. HF patient, Indonesian-speaking, with reported history of schizophrenia, diabetes is presenting due to possible psychiatric complaints/AMS. Patient was found wandering and is not cooperative. Admitted for hyperglycemia. Consult requested to evaluate patient for bizarre  behavior.     Per the ED notes:  No phone contact number available. Previous doctor not in hospital currently. Hx obtained via chart review only. Need medication reconciliation.     Previously, per the patient, she stated her cousin called the ambulance on her today because she was causing a "domestic issue".  She stated that she "thinks  her family called the ambulance because she talks too much". Reportedly she has not been taking her medications, however the patient did not know what medicine she is supposed to be on.  When asked if she wants to kill herself she says yes.  She does not elaborate on why or how she would do it.  She does admit to hearing voices she states that she hears people making noises and crying and making a mess.  She lives in a shelter.  Denies any recreational drug use.  No other complaints.     Today, seen at bedside by psychiatry. The patient was refusing to speak with the team, turning over in bed and at times pretending to be asleep. She is stating that she does not want to talk.     Plan:  - One to one observation for possible SI  - B12/Folate, TSH w/ FT4, Head CT  -EKG for Qtc monitoring should neuroleptic agents need to be used  -PRN: Haldol 5 mg PO/IM/IV q 6Hrs PRN severe agitation if Qtc < 500  -Pt. may warrant admission to psychiatry once medically cleared, will continue  to evaluate and reassess" 12/1/23 - Pt unwilling to participate in interview with treatment team. Pt did not provide any additional information to add to HPI.    See below for HPI from chart note from transfer facility  "This is a 53-y.o. HF patient, Bengali-speaking, with reported history of schizophrenia, diabetes is presenting due to possible psychiatric complaints/AMS. Patient was found wandering and is not cooperative. Admitted for hyperglycemia. Consult requested to evaluate patient for bizarre  behavior.     Per the ED notes:  No phone contact number available. Previous doctor not in hospital currently. Hx obtained via chart review only. Need medication reconciliation.     Previously, per the patient, she stated her cousin called the ambulance on her today because she was causing a "domestic issue".  She stated that she "thinks  her family called the ambulance because she talks too much". Reportedly she has not been taking her medications, however the patient did not know what medicine she is supposed to be on.  When asked if she wants to kill herself she says yes.  She does not elaborate on why or how she would do it.  She does admit to hearing voices she states that she hears people making noises and crying and making a mess.  She lives in a shelter.  Denies any recreational drug use.  No other complaints.     Today, seen at bedside by psychiatry. The patient was refusing to speak with the team, turning over in bed and at times pretending to be asleep. She is stating that she does not want to talk.     Plan:  - One to one observation for possible SI  - B12/Folate, TSH w/ FT4, Head CT  -EKG for Qtc monitoring should neuroleptic agents need to be used  -PRN: Haldol 5 mg PO/IM/IV q 6Hrs PRN severe agitation if Qtc < 500  -Pt. may warrant admission to psychiatry once medically cleared, will continue  to evaluate and reassess"

## 2023-12-01 NOTE — BH INPATIENT PSYCHIATRY ASSESSMENT NOTE - NSBHATTESTCOMMENTATTENDFT_PSY_A_CORE
I spoke with pt in Filipino. Very psychotic and paranoid. Keeps the conversation short and suspicious of us. Does better with smaller groups or face to face. Disorganized. No insight. Will uptitrate seroquel. Diabetes well controlled at other hospital.  I spoke with pt in Surinamese. Very psychotic and paranoid. Keeps the conversation short and suspicious of us. Does better with smaller groups or face to face. Disorganized. No insight. Will uptitrate seroquel. Diabetes well controlled at other hospital.  I spoke with pt in Micronesian. Very psychotic and paranoid. Keeps the conversation short and suspicious of us. Does better with smaller groups or face to face. Disorganized. No insight. Will uptitrate seroquel. Diabetes well controlled at other hospital.

## 2023-12-01 NOTE — BH INPATIENT PSYCHIATRY ASSESSMENT NOTE - NSDCCRITERIA_PSY_ALL_CORE
Pt has improved psychosis and no longer endorsing any suicidal ideation. Pt also able to engage with treatment team.

## 2023-12-01 NOTE — BH INPATIENT PSYCHIATRY ASSESSMENT NOTE - NSBHASSESSSUMMFT_PSY_ALL_CORE
This is a 53-y.o. HF patient, Korean-speaking, with reported history of schizophrenia, diabetes is presenting due to possible psychiatric complaints/AMS. Patient was found wandering and is not cooperative, initially admitted for hyperglycemia and psych CL team at transferring facility reported the patient was endorsing suicidal ideation. Pt transferred for [facility] on 2PC due to concerns that patient was a danger to herself.    While on the unit, the patient has been minimally participatory with treatment team. On arrival 11/30/23, the pt spoke for a few minutes with admitting attending but today unwilling to participate. Pt has limited PPHx in chart, but reportedly has a hx of schizophrenia and taking Seroquel 200 mg nightly but unable to confirm. Collateral information from the patient's sister and PSYCKES will attempt to obtain, given that the patient does not appear to have the capacity to consent for obtaining collateral information. Currently the working diagnosis is decompensated schizophrenia in the context of medication non-adherence. Plan to rule out organic brain disease with CT head without contrast, but pt likely unable to tolerate. Labs and EKG prior to transfer were not concerning. Pt has taken her Seroquel while on the unit, but patient may benefit from alternative psychotropic medication - will need additional PPHx before adjusting.     Plan:  1. Continue inpatient psychiatric admission on 2PC legal status - Pt unable to care for self, putting her at risk to harm herself and reports of suicidal ideation  2. Continue Seroquel 200 mg nightly for psychosis and 50 mg daily for psychosis. Consider alternative antipsychotic medications pending additional PPHx from collateral  3. Haldol 5 mg Q6H PRN for agitation  4. Ativan 1 mg po Q6H PRN for agitation  5. Benadryl 50 mg po Q6H PRN for agitation  6. Melatonin 5 mg nightly for insomnia  7. Continue  This is a 53-y.o. HF patient, Greek-speaking, with reported history of schizophrenia, diabetes is presenting due to possible psychiatric complaints/AMS. Patient was found wandering and is not cooperative, initially admitted for hyperglycemia and psych CL team at transferring facility reported the patient was endorsing suicidal ideation. Pt transferred for [facility] on 2PC due to concerns that patient was a danger to herself.    While on the unit, the patient has been minimally participatory with treatment team. On arrival 11/30/23, the pt spoke for a few minutes with admitting attending but today unwilling to participate. Pt has limited PPHx in chart, but reportedly has a hx of schizophrenia and taking Seroquel 200 mg nightly but unable to confirm. Collateral information from the patient's sister and PSYCKES will attempt to obtain, given that the patient does not appear to have the capacity to consent for obtaining collateral information. Currently the working diagnosis is decompensated schizophrenia in the context of medication non-adherence. Plan to rule out organic brain disease with CT head without contrast, but pt likely unable to tolerate. Labs and EKG prior to transfer were not concerning. Pt has taken her Seroquel while on the unit, but patient may benefit from alternative psychotropic medication - will need additional PPHx before adjusting.     Plan:  1. Continue inpatient psychiatric admission on 2PC legal status - Pt unable to care for self, putting her at risk to harm herself and reports of suicidal ideation  2. Continue Seroquel 200 mg nightly for psychosis and 50 mg daily for psychosis. Consider alternative antipsychotic medications pending additional PPHx from collateral  3. Haldol 5 mg Q6H PRN for agitation  4. Ativan 1 mg po Q6H PRN for agitation  5. Benadryl 50 mg po Q6H PRN for agitation  6. Melatonin 5 mg nightly for insomnia  7. Continue  This is a 53-y.o. HF patient, Upper sorbian-speaking, with reported history of schizophrenia, diabetes is presenting due to possible psychiatric complaints/AMS. Patient was found wandering and is not cooperative, initially admitted for hyperglycemia and psych CL team at transferring facility reported the patient was endorsing suicidal ideation. Pt transferred for [facility] on 2PC due to concerns that patient was a danger to herself.    While on the unit, the patient has been minimally participatory with treatment team. On arrival 11/30/23, the pt spoke for a few minutes with admitting attending but today unwilling to participate. Pt has limited PPHx in chart, but reportedly has a hx of schizophrenia and taking Seroquel 200 mg nightly but unable to confirm. Collateral information from the patient's sister and PSYCKES will attempt to obtain, given that the patient does not appear to have the capacity to consent for obtaining collateral information. Currently the working diagnosis is decompensated schizophrenia in the context of medication non-adherence. Plan to rule out organic brain disease with CT head without contrast, but pt likely unable to tolerate. Labs and EKG prior to transfer were not concerning. Pt has taken her Seroquel while on the unit, but patient may benefit from alternative psychotropic medication - will need additional PPHx before adjusting.     Plan:  1. Continue inpatient psychiatric admission on 2PC legal status - Pt unable to care for self, putting her at risk to harm herself and reports of suicidal ideation  2. Continue Seroquel 200 mg nightly for psychosis and 50 mg daily for psychosis. Consider alternative antipsychotic medications pending additional PPHx from collateral  3. Haldol 5 mg Q6H PRN for agitation  4. Ativan 1 mg po Q6H PRN for agitation  5. Benadryl 50 mg po Q6H PRN for agitation  6. Melatonin 5 mg nightly for insomnia  7. Continue  This is a 53-y.o. HF patient, Romansh-speaking, with reported history of schizophrenia, diabetes is presenting due to possible psychiatric complaints/AMS. Patient was found wandering and is not cooperative, initially admitted for hyperglycemia and psych CL team at transferring facility reported the patient was endorsing suicidal ideation. Pt transferred for [facility] on 2PC due to concerns that patient was a danger to herself.    While on the unit, the patient has been minimally participatory with treatment team. On arrival 11/30/23, the pt spoke for a few minutes with admitting attending but today unwilling to participate. Pt has limited PPHx in chart, but reportedly has a hx of schizophrenia and taking Seroquel 200 mg nightly but unable to confirm. Collateral information from the patient's sister and PSYCKES will attempt to obtain, given that the patient does not appear to have the capacity to consent for obtaining collateral information. Currently the working diagnosis is decompensated schizophrenia in the context of medication non-adherence. Plan to rule out organic brain disease with CT head without contrast, but pt likely unable to tolerate. Labs and EKG prior to transfer were not concerning. Pt has taken her Seroquel while on the unit, but patient may benefit from alternative psychotropic medication - will need additional PPHx before adjusting.     Plan:  1. Continue inpatient psychiatric admission on 2PC legal status - Pt unable to care for self, putting her at risk to harm herself and reports of suicidal ideation  2. Continue Seroquel 200 mg nightly for psychosis and 50 mg daily for psychosis. Consider alternative antipsychotic medications pending additional PPHx from collateral  3. Haldol 5 mg Q6H PRN for agitation  4. Ativan 1 mg po Q6H PRN for agitation  5. Benadryl 50 mg po Q6H PRN for agitation  6. Melatonin 5 mg nightly for insomnia  7. Continue diabetic regimen as previously prescribed This is a 53-y.o. HF patient, Tajik-speaking, with reported history of schizophrenia, diabetes is presenting due to possible psychiatric complaints/AMS. Patient was found wandering and is not cooperative, initially admitted for hyperglycemia and psych CL team at transferring facility reported the patient was endorsing suicidal ideation. Pt transferred for [facility] on 2PC due to concerns that patient was a danger to herself.    While on the unit, the patient has been minimally participatory with treatment team. On arrival 11/30/23, the pt spoke for a few minutes with admitting attending but today unwilling to participate. Pt has limited PPHx in chart, but reportedly has a hx of schizophrenia and taking Seroquel 200 mg nightly but unable to confirm. Collateral information from the patient's sister and PSYCKES will attempt to obtain, given that the patient does not appear to have the capacity to consent for obtaining collateral information. Currently the working diagnosis is decompensated schizophrenia in the context of medication non-adherence. Plan to rule out organic brain disease with CT head without contrast, but pt likely unable to tolerate. Labs and EKG prior to transfer were not concerning. Pt has taken her Seroquel while on the unit, but patient may benefit from alternative psychotropic medication - will need additional PPHx before adjusting.     Plan:  1. Continue inpatient psychiatric admission on 2PC legal status - Pt unable to care for self, putting her at risk to harm herself and reports of suicidal ideation  2. Continue Seroquel 200 mg nightly for psychosis and 50 mg daily for psychosis. Consider alternative antipsychotic medications pending additional PPHx from collateral  3. Haldol 5 mg Q6H PRN for agitation  4. Ativan 1 mg po Q6H PRN for agitation  5. Benadryl 50 mg po Q6H PRN for agitation  6. Melatonin 5 mg nightly for insomnia  7. Continue diabetic regimen as previously prescribed This is a 53-y.o. HF patient, Sami-speaking, with reported history of schizophrenia, diabetes is presenting due to possible psychiatric complaints/AMS. Patient was found wandering and is not cooperative, initially admitted for hyperglycemia and psych CL team at transferring facility reported the patient was endorsing suicidal ideation. Pt transferred for [facility] on 2PC due to concerns that patient was a danger to herself.    While on the unit, the patient has been minimally participatory with treatment team. On arrival 11/30/23, the pt spoke for a few minutes with admitting attending but today unwilling to participate. Pt has limited PPHx in chart, but reportedly has a hx of schizophrenia and taking Seroquel 200 mg nightly but unable to confirm. Collateral information from the patient's sister and PSYCKES will attempt to obtain, given that the patient does not appear to have the capacity to consent for obtaining collateral information. Currently the working diagnosis is decompensated schizophrenia in the context of medication non-adherence. Plan to rule out organic brain disease with CT head without contrast, but pt likely unable to tolerate. Labs and EKG prior to transfer were not concerning. Pt has taken her Seroquel while on the unit, but patient may benefit from alternative psychotropic medication - will need additional PPHx before adjusting.     Plan:  1. Continue inpatient psychiatric admission on 2PC legal status - Pt unable to care for self, putting her at risk to harm herself and reports of suicidal ideation  2. Continue Seroquel 200 mg nightly for psychosis and 50 mg daily for psychosis. Consider alternative antipsychotic medications pending additional PPHx from collateral  3. Haldol 5 mg Q6H PRN for agitation  4. Ativan 1 mg po Q6H PRN for agitation  5. Benadryl 50 mg po Q6H PRN for agitation  6. Melatonin 5 mg nightly for insomnia  7. Continue diabetic regimen as previously prescribed

## 2023-12-02 LAB
GLUCOSE BLDC GLUCOMTR-MCNC: 197 MG/DL — HIGH (ref 70–99)
GLUCOSE BLDC GLUCOMTR-MCNC: 197 MG/DL — HIGH (ref 70–99)
GLUCOSE BLDC GLUCOMTR-MCNC: 274 MG/DL — HIGH (ref 70–99)
GLUCOSE BLDC GLUCOMTR-MCNC: 274 MG/DL — HIGH (ref 70–99)

## 2023-12-02 RX ORDER — HALOPERIDOL DECANOATE 100 MG/ML
5 INJECTION INTRAMUSCULAR ONCE
Refills: 0 | Status: COMPLETED | OUTPATIENT
Start: 2023-12-02 | End: 2023-12-02

## 2023-12-02 RX ORDER — DIPHENHYDRAMINE HCL 50 MG
50 CAPSULE ORAL ONCE
Refills: 0 | Status: COMPLETED | OUTPATIENT
Start: 2023-12-02 | End: 2023-12-02

## 2023-12-02 RX ADMIN — Medication 1 MILLIGRAM(S): at 22:22

## 2023-12-02 RX ADMIN — Medication 10 UNIT(S): at 17:52

## 2023-12-02 RX ADMIN — Medication 10 UNIT(S): at 12:29

## 2023-12-02 RX ADMIN — HALOPERIDOL DECANOATE 5 MILLIGRAM(S): 100 INJECTION INTRAMUSCULAR at 22:20

## 2023-12-02 RX ADMIN — Medication 5 MILLIGRAM(S): at 20:58

## 2023-12-02 RX ADMIN — Medication 650 MILLIGRAM(S): at 10:18

## 2023-12-02 RX ADMIN — QUETIAPINE FUMARATE 200 MILLIGRAM(S): 200 TABLET, FILM COATED ORAL at 20:58

## 2023-12-02 RX ADMIN — QUETIAPINE FUMARATE 50 MILLIGRAM(S): 200 TABLET, FILM COATED ORAL at 10:12

## 2023-12-02 RX ADMIN — Medication 50 MILLIGRAM(S): at 22:20

## 2023-12-02 RX ADMIN — Medication 650 MILLIGRAM(S): at 11:18

## 2023-12-02 RX ADMIN — Medication 3: at 12:30

## 2023-12-02 RX ADMIN — Medication 1: at 17:52

## 2023-12-02 NOTE — BH INPATIENT PSYCHIATRY PROGRESS NOTE - NSBHFUPINTERVALCCFT_PSY_A_CORE
"you're the devil." (translated from Belizean) "you're the devil." (translated from Georgian) "you're the devil." (translated from Belarusian)

## 2023-12-02 NOTE — BH INPATIENT PSYCHIATRY PROGRESS NOTE - NSBHMETABOLIC_PSY_ALL_CORE_FT
BMI: BMI (kg/m2): 19.5 (11-20-23 @ 16:55)  HbA1c: A1C with Estimated Average Glucose Result: 13.4 % (11-20-23 @ 22:35)    Glucose: POCT Blood Glucose.: 197 mg/dL (12-02-23 @ 17:48)    BP: 124/74 (11-30-23 @ 14:26) (124/74 - 124/74)Vital Signs Last 24 Hrs  T(C): --  T(F): --  HR: --  BP: --  BP(mean): --  RR: --  SpO2: --      Lipid Panel:  BMI: BMI (kg/m2): 19.5 (11-20-23 @ 16:55)  HbA1c: A1C with Estimated Average Glucose Result: 13.4 % (11-20-23 @ 22:35)    Glucose: POCT Blood Glucose.: 103 mg/dL (12-19-23 @ 07:51)    BP: 117/80 (12-19-23 @ 08:25) (116/75 - 131/75)Vital Signs Last 24 Hrs  T(C): 37.1 (12-19-23 @ 08:25), Max: 37.2 (12-18-23 @ 16:30)  T(F): 98.8 (12-19-23 @ 08:25), Max: 98.9 (12-18-23 @ 16:30)  HR: 100 (12-19-23 @ 08:25) (93 - 100)  BP: 117/80 (12-19-23 @ 08:25) (117/80 - 131/75)  BP(mean): --  RR: --  SpO2: 99% (12-19-23 @ 08:25) (96% - 99%)      Lipid Panel:

## 2023-12-02 NOTE — BH INPATIENT PSYCHIATRY PROGRESS NOTE - PRN MEDS
MEDICATIONS  (PRN):  acetaminophen     Tablet .. 650 milliGRAM(s) Oral every 6 hours PRN Moderate Pain (4 - 6)  aluminum hydroxide/magnesium hydroxide/simethicone Suspension 30 milliLiter(s) Oral every 6 hours PRN Dyspepsia  dextrose Oral Gel 15 Gram(s) Oral once PRN Blood Glucose LESS THAN 70 milliGRAM(s)/deciliter  diphenhydrAMINE 50 milliGRAM(s) Oral every 6 hours PRN agitation  haloperidol     Tablet 5 milliGRAM(s) Oral every 6 hours PRN agitation  LORazepam     Tablet 1 milliGRAM(s) Oral every 6 hours PRN agitation  magnesium hydroxide Suspension 30 milliLiter(s) Oral daily PRN Constipation  ondansetron Injectable 4 milliGRAM(s) IntraMuscular every 6 hours PRN nausea4   MEDICATIONS  (PRN):  acetaminophen     Tablet .. 650 milliGRAM(s) Oral every 6 hours PRN Moderate Pain (4 - 6)  aluminum hydroxide/magnesium hydroxide/simethicone Suspension 30 milliLiter(s) Oral every 6 hours PRN Dyspepsia  dextrose Oral Gel 15 Gram(s) Oral once PRN Blood Glucose LESS THAN 70 milliGRAM(s)/deciliter  diphenhydrAMINE 50 milliGRAM(s) Oral every 6 hours PRN agitation  haloperidol     Tablet 5 milliGRAM(s) Oral every 6 hours PRN agitation  magnesium hydroxide Suspension 30 milliLiter(s) Oral daily PRN Constipation  ondansetron Injectable 4 milliGRAM(s) IntraMuscular every 6 hours PRN nausea4

## 2023-12-02 NOTE — BH INPATIENT PSYCHIATRY PROGRESS NOTE - NSBHASSESSSUMMFT_PSY_ALL_CORE
This is a 53-y.o. HF patient, Singaporean-speaking, with reported history of schizophrenia, diabetes is presenting due to possible psychiatric complaints/AMS. Patient was found wandering and is not cooperative, initially admitted for hyperglycemia and psych CL team at transferring facility reported the patient was endorsing suicidal ideation. Pt transferred for [facility] on 2PC due to concerns that patient was a danger to herself.    While on the unit, the patient has been minimally participatory with treatment team. On arrival 11/30/23, the pt spoke for a few minutes with admitting attending but today unwilling to participate. Pt has limited PPHx in chart, but reportedly has a hx of schizophrenia and taking Seroquel 200 mg nightly but unable to confirm. Collateral information from the patient's sister and PSYCKES will attempt to obtain, given that the patient does not appear to have the capacity to consent for obtaining collateral information. Currently the working diagnosis is decompensated schizophrenia in the context of medication non-adherence. Plan to rule out organic brain disease with CT head without contrast, but pt likely unable to tolerate. Labs and EKG prior to transfer were not concerning. Pt has taken her Seroquel while on the unit, but patient may benefit from alternative psychotropic medication - will need additional PPHx before adjusting.     12/2 update: pt adherent with Seroquel, refusing to engage with writer and exhibiting some bizarre behaviors     Plan:  1. Continue inpatient psychiatric admission on 2PC legal status - Pt unable to care for self, putting her at risk to harm herself and reports of suicidal ideation  2. Continue Seroquel 200 mg nightly for psychosis and 50 mg daily for psychosis. Consider alternative antipsychotic medications pending additional PPHx from collateral  3. Haldol 5 mg Q6H PRN for agitation  4. Ativan 1 mg po Q6H PRN for agitation  5. Benadryl 50 mg po Q6H PRN for agitation  6. Melatonin 5 mg nightly for insomnia  7. Continue diabetic regimen as previously prescribed This is a 53-y.o. HF patient, Libyan-speaking, with reported history of schizophrenia, diabetes is presenting due to possible psychiatric complaints/AMS. Patient was found wandering and is not cooperative, initially admitted for hyperglycemia and psych CL team at transferring facility reported the patient was endorsing suicidal ideation. Pt transferred for [facility] on 2PC due to concerns that patient was a danger to herself.    While on the unit, the patient has been minimally participatory with treatment team. On arrival 11/30/23, the pt spoke for a few minutes with admitting attending but today unwilling to participate. Pt has limited PPHx in chart, but reportedly has a hx of schizophrenia and taking Seroquel 200 mg nightly but unable to confirm. Collateral information from the patient's sister and PSYCKES will attempt to obtain, given that the patient does not appear to have the capacity to consent for obtaining collateral information. Currently the working diagnosis is decompensated schizophrenia in the context of medication non-adherence. Plan to rule out organic brain disease with CT head without contrast, but pt likely unable to tolerate. Labs and EKG prior to transfer were not concerning. Pt has taken her Seroquel while on the unit, but patient may benefit from alternative psychotropic medication - will need additional PPHx before adjusting.     12/2 update: pt adherent with Seroquel, refusing to engage with writer and exhibiting some bizarre behaviors     Plan:  1. Continue inpatient psychiatric admission on 2PC legal status - Pt unable to care for self, putting her at risk to harm herself and reports of suicidal ideation  2. Continue Seroquel 200 mg nightly for psychosis and 50 mg daily for psychosis. Consider alternative antipsychotic medications pending additional PPHx from collateral  3. Haldol 5 mg Q6H PRN for agitation  4. Ativan 1 mg po Q6H PRN for agitation  5. Benadryl 50 mg po Q6H PRN for agitation  6. Melatonin 5 mg nightly for insomnia  7. Continue diabetic regimen as previously prescribed This is a 53-y.o. HF patient, Greek-speaking, with reported history of schizophrenia, diabetes is presenting due to possible psychiatric complaints/AMS. Patient was found wandering and is not cooperative, initially admitted for hyperglycemia and psych CL team at transferring facility reported the patient was endorsing suicidal ideation. Pt transferred for [facility] on 2PC due to concerns that patient was a danger to herself.    While on the unit, the patient has been minimally participatory with treatment team. On arrival 11/30/23, the pt spoke for a few minutes with admitting attending but today unwilling to participate. Pt has limited PPHx in chart, but reportedly has a hx of schizophrenia and taking Seroquel 200 mg nightly but unable to confirm. Collateral information from the patient's sister and PSYCKES will attempt to obtain, given that the patient does not appear to have the capacity to consent for obtaining collateral information. Currently the working diagnosis is decompensated schizophrenia in the context of medication non-adherence. Plan to rule out organic brain disease with CT head without contrast, but pt likely unable to tolerate. Labs and EKG prior to transfer were not concerning. Pt has taken her Seroquel while on the unit, but patient may benefit from alternative psychotropic medication - will need additional PPHx before adjusting.     12/2 update: pt adherent with Seroquel, refusing to engage with writer and exhibiting some bizarre behaviors     Plan:  1. Continue inpatient psychiatric admission on 2PC legal status - Pt unable to care for self, putting her at risk to harm herself and reports of suicidal ideation  2. Continue Seroquel 200 mg nightly for psychosis and 50 mg daily for psychosis. Consider alternative antipsychotic medications pending additional PPHx from collateral  3. Haldol 5 mg Q6H PRN for agitation  4. Ativan 1 mg po Q6H PRN for agitation  5. Benadryl 50 mg po Q6H PRN for agitation  6. Melatonin 5 mg nightly for insomnia  7. Continue diabetic regimen as previously prescribed

## 2023-12-02 NOTE — BH INPATIENT PSYCHIATRY PROGRESS NOTE - NSBHCHARTREVIEWVS_PSY_A_CORE FT
Vital Signs Last 24 Hrs  T(C): --  T(F): --  HR: --  BP: --  BP(mean): --  RR: --  SpO2: --     Vital Signs Last 24 Hrs  T(C): 37.1 (12-19-23 @ 08:25), Max: 37.2 (12-18-23 @ 16:30)  T(F): 98.8 (12-19-23 @ 08:25), Max: 98.9 (12-18-23 @ 16:30)  HR: 100 (12-19-23 @ 08:25) (93 - 100)  BP: 117/80 (12-19-23 @ 08:25) (117/80 - 131/75)  BP(mean): --  RR: --  SpO2: 99% (12-19-23 @ 08:25) (96% - 99%)

## 2023-12-02 NOTE — BH INPATIENT PSYCHIATRY PROGRESS NOTE - NSTXPSYCHOINTERMD_PSY_ALL_CORE
seroquel, speak with [facility] ACT Team seroquel, speak with [facility] ACT Team seroquel, speak with [facility] ACT Team

## 2023-12-02 NOTE — BH INPATIENT PSYCHIATRY PROGRESS NOTE - NSBHMSESPEECH_PSY_A_CORE
pt did not respond to most questions, minimal speech, said only 3 words during encounter. normal volume, rate

## 2023-12-02 NOTE — BH INPATIENT PSYCHIATRY PROGRESS NOTE - CURRENT MEDICATION
MEDICATIONS  (STANDING):  dextrose 5%. 1000 milliLiter(s) (50 mL/Hr) IV Continuous <Continuous>  dextrose 5%. 1000 milliLiter(s) (100 mL/Hr) IV Continuous <Continuous>  dextrose 50% Injectable 12.5 Gram(s) IV Push once  dextrose 50% Injectable 25 Gram(s) IV Push once  dextrose 50% Injectable 25 Gram(s) IV Push once  glucagon  Injectable 1 milliGRAM(s) IntraMuscular once  insulin glargine Injectable (LANTUS) 18 Unit(s) SubCutaneous at bedtime  insulin lispro (ADMELOG) corrective regimen sliding scale   SubCutaneous Before meals and at bedtime  insulin lispro Injectable (ADMELOG) 6 Unit(s) SubCutaneous before breakfast  insulin lispro Injectable (ADMELOG) 10 Unit(s) SubCutaneous before dinner  insulin lispro Injectable (ADMELOG) 10 Unit(s) SubCutaneous before lunch  melatonin. 5 milliGRAM(s) Oral at bedtime  QUEtiapine 50 milliGRAM(s) Oral daily  QUEtiapine 200 milliGRAM(s) Oral at bedtime    MEDICATIONS  (PRN):  acetaminophen     Tablet .. 650 milliGRAM(s) Oral every 6 hours PRN Moderate Pain (4 - 6)  aluminum hydroxide/magnesium hydroxide/simethicone Suspension 30 milliLiter(s) Oral every 6 hours PRN Dyspepsia  dextrose Oral Gel 15 Gram(s) Oral once PRN Blood Glucose LESS THAN 70 milliGRAM(s)/deciliter  diphenhydrAMINE 50 milliGRAM(s) Oral every 6 hours PRN agitation  haloperidol     Tablet 5 milliGRAM(s) Oral every 6 hours PRN agitation  LORazepam     Tablet 1 milliGRAM(s) Oral every 6 hours PRN agitation  magnesium hydroxide Suspension 30 milliLiter(s) Oral daily PRN Constipation  ondansetron Injectable 4 milliGRAM(s) IntraMuscular every 6 hours PRN nausea4   MEDICATIONS  (STANDING):  dextrose 5%. 1000 milliLiter(s) (50 mL/Hr) IV Continuous <Continuous>  dextrose 5%. 1000 milliLiter(s) (100 mL/Hr) IV Continuous <Continuous>  dextrose 50% Injectable 12.5 Gram(s) IV Push once  dextrose 50% Injectable 25 Gram(s) IV Push once  dextrose 50% Injectable 25 Gram(s) IV Push once  glucagon  Injectable 1 milliGRAM(s) IntraMuscular once  insulin glargine Injectable (LANTUS) 18 Unit(s) SubCutaneous at bedtime  insulin lispro (ADMELOG) corrective regimen sliding scale   SubCutaneous Before meals and at bedtime  insulin lispro Injectable (ADMELOG) 10 Unit(s) SubCutaneous before dinner  insulin lispro Injectable (ADMELOG) 6 Unit(s) SubCutaneous before breakfast  insulin lispro Injectable (ADMELOG) 10 Unit(s) SubCutaneous before lunch  melatonin. 5 milliGRAM(s) Oral at bedtime  QUEtiapine 125 milliGRAM(s) Oral daily  QUEtiapine 200 milliGRAM(s) Oral at bedtime    MEDICATIONS  (PRN):  acetaminophen     Tablet .. 650 milliGRAM(s) Oral every 6 hours PRN Moderate Pain (4 - 6)  aluminum hydroxide/magnesium hydroxide/simethicone Suspension 30 milliLiter(s) Oral every 6 hours PRN Dyspepsia  dextrose Oral Gel 15 Gram(s) Oral once PRN Blood Glucose LESS THAN 70 milliGRAM(s)/deciliter  diphenhydrAMINE 50 milliGRAM(s) Oral every 6 hours PRN agitation  haloperidol     Tablet 5 milliGRAM(s) Oral every 6 hours PRN agitation  magnesium hydroxide Suspension 30 milliLiter(s) Oral daily PRN Constipation  ondansetron Injectable 4 milliGRAM(s) IntraMuscular every 6 hours PRN nausea4

## 2023-12-02 NOTE — CHART NOTE - NSCHARTNOTEFT_GEN_A_CORE
Pt noted to be punching the walls, talking to self, not responding to verbal redirection. Pt offered PO meds and refused. Pt was given IM Haldol 5mg for psychotic agitation, Ativan 1mg for anxiety, Benadryl 50mg for anxiety and EPS prophylaxis for the safety of the patient and others on the unit.

## 2023-12-02 NOTE — BH INPATIENT PSYCHIATRY PROGRESS NOTE - NSBHFUPINTERVALHXFT_PSY_A_CORE
Pt observed to be licking the wall in the hallway then went to her room. On approach pt was in bed crying under the blankets. Writer introduced self in Maori and pt continued to cry then said "you're the devil." Attempted to engage patient, pt stopped crying and then appeared to go to sleep.     On chart review, pt has been taking Seroquel as rx and has not received any psychotropic prns since yesterday afternoon.  Pt observed to be licking the wall in the hallway then went to her room. On approach pt was in bed crying under the blankets. Writer introduced self in English and pt continued to cry then said "you're the devil." Attempted to engage patient, pt stopped crying and then appeared to go to sleep.     On chart review, pt has been taking Seroquel as rx and has not received any psychotropic prns since yesterday afternoon.  Pt observed to be licking the wall in the hallway then went to her room. On approach pt was in bed crying under the blankets. Writer introduced self in Ukrainian and pt continued to cry then said "you're the devil." Attempted to engage patient, pt stopped crying and then appeared to go to sleep.     On chart review, pt has been taking Seroquel as rx and has not received any psychotropic prns since yesterday afternoon.

## 2023-12-03 LAB
GLUCOSE BLDC GLUCOMTR-MCNC: 176 MG/DL — HIGH (ref 70–99)
GLUCOSE BLDC GLUCOMTR-MCNC: 176 MG/DL — HIGH (ref 70–99)
GLUCOSE BLDC GLUCOMTR-MCNC: 208 MG/DL — HIGH (ref 70–99)
GLUCOSE BLDC GLUCOMTR-MCNC: 208 MG/DL — HIGH (ref 70–99)
GLUCOSE BLDC GLUCOMTR-MCNC: 385 MG/DL — HIGH (ref 70–99)
GLUCOSE BLDC GLUCOMTR-MCNC: 385 MG/DL — HIGH (ref 70–99)

## 2023-12-03 PROCEDURE — 99232 SBSQ HOSP IP/OBS MODERATE 35: CPT

## 2023-12-03 RX ORDER — QUETIAPINE FUMARATE 200 MG/1
75 TABLET, FILM COATED ORAL DAILY
Refills: 0 | Status: DISCONTINUED | OUTPATIENT
Start: 2023-12-04 | End: 2023-12-05

## 2023-12-03 RX ADMIN — Medication 5: at 21:45

## 2023-12-03 RX ADMIN — Medication 10 UNIT(S): at 13:00

## 2023-12-03 RX ADMIN — INSULIN GLARGINE 18 UNIT(S): 100 INJECTION, SOLUTION SUBCUTANEOUS at 21:48

## 2023-12-03 RX ADMIN — QUETIAPINE FUMARATE 200 MILLIGRAM(S): 200 TABLET, FILM COATED ORAL at 21:47

## 2023-12-03 RX ADMIN — QUETIAPINE FUMARATE 50 MILLIGRAM(S): 200 TABLET, FILM COATED ORAL at 13:01

## 2023-12-03 RX ADMIN — Medication 650 MILLIGRAM(S): at 16:51

## 2023-12-03 RX ADMIN — Medication 2: at 13:00

## 2023-12-03 RX ADMIN — Medication 10 UNIT(S): at 16:52

## 2023-12-03 RX ADMIN — Medication 1: at 16:52

## 2023-12-03 RX ADMIN — Medication 5 MILLIGRAM(S): at 21:46

## 2023-12-03 NOTE — BH INPATIENT PSYCHIATRY PROGRESS NOTE - NSBHASSESSSUMMFT_PSY_ALL_CORE
This is a 53-y.o. HF patient, Bangladeshi-speaking, with reported history of schizophrenia, diabetes is presenting due to possible psychiatric complaints/AMS. Patient was found wandering and is not cooperative, initially admitted for hyperglycemia and psych CL team at transferring facility reported the patient was endorsing suicidal ideation. Pt transferred for [facility] on 2PC due to concerns that patient was a danger to herself.    While on the unit, the patient has been minimally participatory with treatment team. On arrival 11/30/23, the pt spoke for a few minutes with admitting attending but today unwilling to participate. Pt has limited PPHx in chart, but reportedly has a hx of schizophrenia and taking Seroquel 200 mg nightly but unable to confirm. Collateral information from the patient's sister and PSYCKES will attempt to obtain, given that the patient does not appear to have the capacity to consent for obtaining collateral information. Currently the working diagnosis is decompensated schizophrenia in the context of medication non-adherence. Plan to rule out organic brain disease with CT head without contrast, but pt likely unable to tolerate. Labs and EKG prior to transfer were not concerning. Pt has taken her Seroquel while on the unit, but patient may benefit from alternative psychotropic medication - will need additional PPHx before adjusting.     12/2 update: pt adherent with Seroquel, refusing to engage with writer and exhibiting some bizarre behaviors     Plan:  1. Continue inpatient psychiatric admission on 2PC legal status - Pt unable to care for self, putting her at risk to harm herself and reports of suicidal ideation  2. Continue Seroquel 200 mg nightly for psychosis and 50 mg daily for psychosis. Consider alternative antipsychotic medications pending additional PPHx from collateral  3. Haldol 5 mg Q6H PRN for agitation  4. Ativan 1 mg po Q6H PRN for agitation  5. Benadryl 50 mg po Q6H PRN for agitation  6. Melatonin 5 mg nightly for insomnia  7. Continue diabetic regimen as previously prescribed This is a 53-y.o. HF patient, Slovenian-speaking, with reported history of schizophrenia, diabetes is presenting due to possible psychiatric complaints/AMS. Patient was found wandering and is not cooperative, initially admitted for hyperglycemia and psych CL team at transferring facility reported the patient was endorsing suicidal ideation. Pt transferred for [facility] on 2PC due to concerns that patient was a danger to herself.    While on the unit, the patient has been minimally participatory with treatment team. On arrival 11/30/23, the pt spoke for a few minutes with admitting attending but today unwilling to participate. Pt has limited PPHx in chart, but reportedly has a hx of schizophrenia and taking Seroquel 200 mg nightly but unable to confirm. Collateral information from the patient's sister and PSYCKES will attempt to obtain, given that the patient does not appear to have the capacity to consent for obtaining collateral information. Currently the working diagnosis is decompensated schizophrenia in the context of medication non-adherence. Plan to rule out organic brain disease with CT head without contrast, but pt likely unable to tolerate. Labs and EKG prior to transfer were not concerning. Pt has taken her Seroquel while on the unit, but patient may benefit from alternative psychotropic medication - will need additional PPHx before adjusting.     12/2 update: pt adherent with Seroquel, refusing to engage with writer and exhibiting some bizarre behaviors     Plan:  1. Continue inpatient psychiatric admission on 2PC legal status - Pt unable to care for self, putting her at risk to harm herself and reports of suicidal ideation  2. Continue Seroquel 200 mg nightly for psychosis and 50 mg daily for psychosis. Consider alternative antipsychotic medications pending additional PPHx from collateral  3. Haldol 5 mg Q6H PRN for agitation  4. Ativan 1 mg po Q6H PRN for agitation  5. Benadryl 50 mg po Q6H PRN for agitation  6. Melatonin 5 mg nightly for insomnia  7. Continue diabetic regimen as previously prescribed

## 2023-12-03 NOTE — BH INPATIENT PSYCHIATRY PROGRESS NOTE - PRN MEDS
MEDICATIONS  (PRN):  acetaminophen     Tablet .. 650 milliGRAM(s) Oral every 6 hours PRN Moderate Pain (4 - 6)  aluminum hydroxide/magnesium hydroxide/simethicone Suspension 30 milliLiter(s) Oral every 6 hours PRN Dyspepsia  dextrose Oral Gel 15 Gram(s) Oral once PRN Blood Glucose LESS THAN 70 milliGRAM(s)/deciliter  diphenhydrAMINE 50 milliGRAM(s) Oral every 6 hours PRN agitation  haloperidol     Tablet 5 milliGRAM(s) Oral every 6 hours PRN agitation  LORazepam     Tablet 1 milliGRAM(s) Oral every 6 hours PRN agitation  magnesium hydroxide Suspension 30 milliLiter(s) Oral daily PRN Constipation  ondansetron Injectable 4 milliGRAM(s) IntraMuscular every 6 hours PRN nausea4

## 2023-12-03 NOTE — BH INPATIENT PSYCHIATRY PROGRESS NOTE - NSTXPSYCHOINTERMD_PSY_ALL_CORE
seroquel, speak with [facility] ACT Team seroquel, speak with [facility] ACT Team

## 2023-12-03 NOTE — BH INPATIENT PSYCHIATRY PROGRESS NOTE - NSBHMETABOLIC_PSY_ALL_CORE_FT
BMI: BMI (kg/m2): 19.5 (11-20-23 @ 16:55)  HbA1c: A1C with Estimated Average Glucose Result: 13.4 % (11-20-23 @ 22:35)    Glucose: POCT Blood Glucose.: 176 mg/dL (12-03-23 @ 16:47)    BP: 123/71 (12-03-23 @ 15:46) (123/71 - 123/71)Vital Signs Last 24 Hrs  T(C): 36.7 (12-03-23 @ 15:46), Max: 36.7 (12-03-23 @ 15:46)  T(F): 98 (12-03-23 @ 15:46), Max: 98 (12-03-23 @ 15:46)  HR: 88 (12-03-23 @ 15:46) (88 - 88)  BP: 123/71 (12-03-23 @ 15:46) (123/71 - 123/71)  BP(mean): --  RR: 18 (12-03-23 @ 15:46) (18 - 18)  SpO2: 95% (12-03-23 @ 15:46) (95% - 95%)      Lipid Panel:

## 2023-12-03 NOTE — BH INPATIENT PSYCHIATRY PROGRESS NOTE - NSBHCHARTREVIEWVS_PSY_A_CORE FT
Vital Signs Last 24 Hrs  T(C): 36.7 (12-03-23 @ 15:46), Max: 36.7 (12-03-23 @ 15:46)  T(F): 98 (12-03-23 @ 15:46), Max: 98 (12-03-23 @ 15:46)  HR: 88 (12-03-23 @ 15:46) (88 - 88)  BP: 123/71 (12-03-23 @ 15:46) (123/71 - 123/71)  BP(mean): --  RR: 18 (12-03-23 @ 15:46) (18 - 18)  SpO2: 95% (12-03-23 @ 15:46) (95% - 95%)

## 2023-12-03 NOTE — BH INPATIENT PSYCHIATRY PROGRESS NOTE - CURRENT MEDICATION
MEDICATIONS  (STANDING):  dextrose 5%. 1000 milliLiter(s) (50 mL/Hr) IV Continuous <Continuous>  dextrose 5%. 1000 milliLiter(s) (100 mL/Hr) IV Continuous <Continuous>  dextrose 50% Injectable 12.5 Gram(s) IV Push once  dextrose 50% Injectable 25 Gram(s) IV Push once  dextrose 50% Injectable 25 Gram(s) IV Push once  glucagon  Injectable 1 milliGRAM(s) IntraMuscular once  insulin glargine Injectable (LANTUS) 18 Unit(s) SubCutaneous at bedtime  insulin lispro (ADMELOG) corrective regimen sliding scale   SubCutaneous Before meals and at bedtime  insulin lispro Injectable (ADMELOG) 6 Unit(s) SubCutaneous before breakfast  insulin lispro Injectable (ADMELOG) 10 Unit(s) SubCutaneous before dinner  insulin lispro Injectable (ADMELOG) 10 Unit(s) SubCutaneous before lunch  melatonin. 5 milliGRAM(s) Oral at bedtime  QUEtiapine 75 milliGRAM(s) Oral daily  QUEtiapine 200 milliGRAM(s) Oral at bedtime    MEDICATIONS  (PRN):  acetaminophen     Tablet .. 650 milliGRAM(s) Oral every 6 hours PRN Moderate Pain (4 - 6)  aluminum hydroxide/magnesium hydroxide/simethicone Suspension 30 milliLiter(s) Oral every 6 hours PRN Dyspepsia  dextrose Oral Gel 15 Gram(s) Oral once PRN Blood Glucose LESS THAN 70 milliGRAM(s)/deciliter  diphenhydrAMINE 50 milliGRAM(s) Oral every 6 hours PRN agitation  haloperidol     Tablet 5 milliGRAM(s) Oral every 6 hours PRN agitation  LORazepam     Tablet 1 milliGRAM(s) Oral every 6 hours PRN agitation  magnesium hydroxide Suspension 30 milliLiter(s) Oral daily PRN Constipation  ondansetron Injectable 4 milliGRAM(s) IntraMuscular every 6 hours PRN nausea4

## 2023-12-03 NOTE — BH INPATIENT PSYCHIATRY PROGRESS NOTE - NSBHATTESTBILLING_PSY_A_CORE
03976-[id number] OBS or IP - moderate complexity OR 35-49 mins 15004-Zfrcpbkpsm OBS or IP - moderate complexity OR 35-49 mins

## 2023-12-03 NOTE — BH INPATIENT PSYCHIATRY PROGRESS NOTE - NSBHFUPINTERVALHXFT_PSY_A_CORE
I spoke to pt in Mongolian. In contrast to yesterday, when she was licking the wall and telling the moonlighter she was the devil, she was much friendlier to me and spent much more time talking to me. Gave consent for me to speak with sisters if I can find their numbers. Internally preoccupied and staring at the wall when I first came in but then engaged in conversation. Will increase seroquel to 75/200. Pt asked if I ever planned to discharge her and I said yes. Spoke to pt for 25 min and documented for 10 min.  I spoke to pt in Bulgarian. In contrast to yesterday, when she was licking the wall and telling the moonlighter she was the devil, she was much friendlier to me and spent much more time talking to me. Gave consent for me to speak with sisters if I can find their numbers. Internally preoccupied and staring at the wall when I first came in but then engaged in conversation. Will increase seroquel to 75/200. Pt asked if I ever planned to discharge her and I said yes. Spoke to pt for 25 min and documented for 10 min.

## 2023-12-04 LAB
GLUCOSE BLDC GLUCOMTR-MCNC: 162 MG/DL — HIGH (ref 70–99)
GLUCOSE BLDC GLUCOMTR-MCNC: 162 MG/DL — HIGH (ref 70–99)
GLUCOSE BLDC GLUCOMTR-MCNC: 186 MG/DL — HIGH (ref 70–99)
GLUCOSE BLDC GLUCOMTR-MCNC: 186 MG/DL — HIGH (ref 70–99)
GLUCOSE BLDC GLUCOMTR-MCNC: 243 MG/DL — HIGH (ref 70–99)
GLUCOSE BLDC GLUCOMTR-MCNC: 243 MG/DL — HIGH (ref 70–99)
GLUCOSE BLDC GLUCOMTR-MCNC: 264 MG/DL — HIGH (ref 70–99)
GLUCOSE BLDC GLUCOMTR-MCNC: 264 MG/DL — HIGH (ref 70–99)

## 2023-12-04 PROCEDURE — 99232 SBSQ HOSP IP/OBS MODERATE 35: CPT

## 2023-12-04 RX ADMIN — Medication 3: at 18:46

## 2023-12-04 RX ADMIN — Medication 2: at 13:30

## 2023-12-04 RX ADMIN — Medication 10 UNIT(S): at 13:31

## 2023-12-04 RX ADMIN — Medication 5 MILLIGRAM(S): at 22:16

## 2023-12-04 RX ADMIN — INSULIN GLARGINE 18 UNIT(S): 100 INJECTION, SOLUTION SUBCUTANEOUS at 22:15

## 2023-12-04 RX ADMIN — Medication 6 UNIT(S): at 07:44

## 2023-12-04 RX ADMIN — QUETIAPINE FUMARATE 200 MILLIGRAM(S): 200 TABLET, FILM COATED ORAL at 22:16

## 2023-12-04 RX ADMIN — Medication 1: at 07:57

## 2023-12-04 RX ADMIN — Medication 10 UNIT(S): at 18:46

## 2023-12-04 RX ADMIN — Medication 1: at 22:17

## 2023-12-04 RX ADMIN — Medication 1 MILLIGRAM(S): at 22:15

## 2023-12-04 RX ADMIN — Medication 50 MILLIGRAM(S): at 22:15

## 2023-12-04 NOTE — BH INPATIENT PSYCHIATRY PROGRESS NOTE - NSBHMETABOLIC_PSY_ALL_CORE_FT
BMI: BMI (kg/m2): 19.5 (11-20-23 @ 16:55)  HbA1c: A1C with Estimated Average Glucose Result: 13.4 % (11-20-23 @ 22:35)    Glucose: POCT Blood Glucose.: 243 mg/dL (12-04-23 @ 13:28)    BP: 123/71 (12-03-23 @ 15:46) (123/71 - 123/71)Vital Signs Last 24 Hrs  T(C): --  T(F): --  HR: --  BP: --  BP(mean): --  RR: --  SpO2: --      Lipid Panel:  BMI: BMI (kg/m2): 19.5 (11-20-23 @ 16:55)  HbA1c: A1C with Estimated Average Glucose Result: 13.4 % (11-20-23 @ 22:35)    Glucose: POCT Blood Glucose.: 103 mg/dL (12-19-23 @ 07:51)    BP: 117/80 (12-19-23 @ 08:25) (116/75 - 131/75)Vital Signs Last 24 Hrs  T(C): 37.1 (12-19-23 @ 08:25), Max: 37.2 (12-18-23 @ 16:30)  T(F): 98.8 (12-19-23 @ 08:25), Max: 98.9 (12-18-23 @ 16:30)  HR: 100 (12-19-23 @ 08:25) (93 - 100)  BP: 117/80 (12-19-23 @ 08:25) (117/80 - 131/75)  BP(mean): --  RR: --  SpO2: 99% (12-19-23 @ 08:25) (96% - 99%)      Lipid Panel:

## 2023-12-04 NOTE — BH INPATIENT PSYCHIATRY PROGRESS NOTE - NSBHFUPINTERVALHXFT_PSY_A_CORE
Pt seen keeping to herself and walking in the hallway with a blanket over her head. Approached by writer and asked patient if she would like to talk (in Guamanian) and offered to speak with  services. Pt declined and asked if the conversation could wait until tomorrow.  Pt seen keeping to herself and walking in the hallway with a blanket over her head. Approached by writer and asked patient if she would like to talk (in Mauritanian) and offered to speak with  services. Pt declined and asked if the conversation could wait until tomorrow.

## 2023-12-04 NOTE — BH INPATIENT PSYCHIATRY PROGRESS NOTE - NSTXPSYCHOINTERMD_PSY_ALL_CORE
seroquel, speak with [facility] ACT Team seroquel, speak with [facility] ACT Team

## 2023-12-04 NOTE — BH INPATIENT PSYCHIATRY PROGRESS NOTE - NSBHATTESTCOMMENTATTENDFT_PSY_A_CORE
I spoke with pt in Moroccan. Pt doing better with increased seroquel at least with me. Required PRNs over weekend and called the on-call resident moonlighter the devil. Paranoid. Disheveled and exposing self inappropriately at times but open to redirection. Spoke with pt for 25 min and documented for 10 min.  I spoke with pt in Danish. Pt doing better with increased seroquel at least with me. Required PRNs over weekend and called the on-call resident moonlighter the devil. Paranoid. Disheveled and exposing self inappropriately at times but open to redirection. Spoke with pt for 25 min and documented for 10 min.

## 2023-12-04 NOTE — BH INPATIENT PSYCHIATRY PROGRESS NOTE - CURRENT MEDICATION
MEDICATIONS  (STANDING):  dextrose 5%. 1000 milliLiter(s) (50 mL/Hr) IV Continuous <Continuous>  dextrose 5%. 1000 milliLiter(s) (100 mL/Hr) IV Continuous <Continuous>  dextrose 50% Injectable 25 Gram(s) IV Push once  dextrose 50% Injectable 12.5 Gram(s) IV Push once  dextrose 50% Injectable 25 Gram(s) IV Push once  glucagon  Injectable 1 milliGRAM(s) IntraMuscular once  insulin glargine Injectable (LANTUS) 18 Unit(s) SubCutaneous at bedtime  insulin lispro (ADMELOG) corrective regimen sliding scale   SubCutaneous Before meals and at bedtime  insulin lispro Injectable (ADMELOG) 10 Unit(s) SubCutaneous before lunch  insulin lispro Injectable (ADMELOG) 6 Unit(s) SubCutaneous before breakfast  insulin lispro Injectable (ADMELOG) 10 Unit(s) SubCutaneous before dinner  melatonin. 5 milliGRAM(s) Oral at bedtime  QUEtiapine 75 milliGRAM(s) Oral daily  QUEtiapine 200 milliGRAM(s) Oral at bedtime    MEDICATIONS  (PRN):  acetaminophen     Tablet .. 650 milliGRAM(s) Oral every 6 hours PRN Moderate Pain (4 - 6)  aluminum hydroxide/magnesium hydroxide/simethicone Suspension 30 milliLiter(s) Oral every 6 hours PRN Dyspepsia  dextrose Oral Gel 15 Gram(s) Oral once PRN Blood Glucose LESS THAN 70 milliGRAM(s)/deciliter  diphenhydrAMINE 50 milliGRAM(s) Oral every 6 hours PRN agitation  haloperidol     Tablet 5 milliGRAM(s) Oral every 6 hours PRN agitation  LORazepam     Tablet 1 milliGRAM(s) Oral every 6 hours PRN agitation  magnesium hydroxide Suspension 30 milliLiter(s) Oral daily PRN Constipation  ondansetron Injectable 4 milliGRAM(s) IntraMuscular every 6 hours PRN nausea4   MEDICATIONS  (STANDING):  dextrose 5%. 1000 milliLiter(s) (50 mL/Hr) IV Continuous <Continuous>  dextrose 5%. 1000 milliLiter(s) (100 mL/Hr) IV Continuous <Continuous>  dextrose 50% Injectable 12.5 Gram(s) IV Push once  dextrose 50% Injectable 25 Gram(s) IV Push once  dextrose 50% Injectable 25 Gram(s) IV Push once  glucagon  Injectable 1 milliGRAM(s) IntraMuscular once  insulin glargine Injectable (LANTUS) 18 Unit(s) SubCutaneous at bedtime  insulin lispro (ADMELOG) corrective regimen sliding scale   SubCutaneous Before meals and at bedtime  insulin lispro Injectable (ADMELOG) 6 Unit(s) SubCutaneous before breakfast  insulin lispro Injectable (ADMELOG) 10 Unit(s) SubCutaneous before dinner  insulin lispro Injectable (ADMELOG) 10 Unit(s) SubCutaneous before lunch  melatonin. 5 milliGRAM(s) Oral at bedtime  QUEtiapine 125 milliGRAM(s) Oral daily  QUEtiapine 200 milliGRAM(s) Oral at bedtime    MEDICATIONS  (PRN):  acetaminophen     Tablet .. 650 milliGRAM(s) Oral every 6 hours PRN Moderate Pain (4 - 6)  aluminum hydroxide/magnesium hydroxide/simethicone Suspension 30 milliLiter(s) Oral every 6 hours PRN Dyspepsia  dextrose Oral Gel 15 Gram(s) Oral once PRN Blood Glucose LESS THAN 70 milliGRAM(s)/deciliter  diphenhydrAMINE 50 milliGRAM(s) Oral every 6 hours PRN agitation  haloperidol     Tablet 5 milliGRAM(s) Oral every 6 hours PRN agitation  magnesium hydroxide Suspension 30 milliLiter(s) Oral daily PRN Constipation  ondansetron Injectable 4 milliGRAM(s) IntraMuscular every 6 hours PRN nausea4

## 2023-12-04 NOTE — BH INPATIENT PSYCHIATRY PROGRESS NOTE - NSBHATTESTBILLING_PSY_A_CORE
79660-Tpyosildim OBS or IP - moderate complexity OR 35-49 mins 41851-[id number] OBS or IP - moderate complexity OR 35-49 mins

## 2023-12-04 NOTE — BH INPATIENT PSYCHIATRY PROGRESS NOTE - NSBHASSESSSUMMFT_PSY_ALL_CORE
This is a 53-y.o. HF patient, Mosotho-speaking, with reported history of schizophrenia, diabetes is presenting due to possible psychiatric complaints/AMS. Patient was found wandering and is not cooperative, initially admitted for hyperglycemia and psych CL team at transferring facility reported the patient was endorsing suicidal ideation. Pt transferred for [facility] on 2PC due to concerns that patient was a danger to herself.    While on the unit, the patient has been minimally participatory with treatment team. On arrival 11/30/23, the pt spoke for a few minutes with admitting attending but today unwilling to participate. Pt has limited PPHx in chart, but reportedly has a hx of schizophrenia and taking Seroquel 200 mg nightly but unable to confirm. Collateral information from the patient's sister and [facility] will attempt to obtain, given that the patient does not appear to have the capacity to consent for obtaining collateral information. Currently the working diagnosis is decompensated schizophrenia in the context of medication non-adherence. Plan to rule out organic brain disease with CT head without contrast, but pt likely unable to tolerate. Labs and EKG prior to transfer were not concerning. Pt has taken her Seroquel while on the unit, but patient may benefit from alternative psychotropic medication - will need additional PPHx before adjusting.     12/2 update: pt adherent with Seroquel, refusing to engage with writer and exhibiting some bizarre behaviors     12/3: I spoke to pt in [place]. In contrast to yesterday, when she was licking the wall and telling the moonlighter she was the devil, she was much friendlier to me and spent much more time talking to me. Gave consent for me to speak with sisters if I can find their numbers. Internally preoccupied and staring at the wall when I first came in but then engaged in conversation. Will increase seroquel to 75/200.    12/4: Pt pleasant, keeping to self, but politely asked to wait until tomorrow to talk    Plan:  1. Continue inpatient psychiatric admission on 2PC legal status - Pt unable to care for self, putting her at risk to harm herself and reports of suicidal ideation  2. Continue Seroquel 200 mg nightly for psychosis and 75 mg daily for psychosis. Consider alternative antipsychotic medications pending additional PPHx from collateral  3. Haldol 5 mg Q6H PRN for agitation  4. Ativan 1 mg po Q6H PRN for agitation  5. Benadryl 50 mg po Q6H PRN for agitation  6. Melatonin 5 mg nightly for insomnia  7. Continue diabetic regimen as previously prescribed This is a 53-y.o. HF patient, Dutch-speaking, with reported history of schizophrenia, diabetes is presenting due to possible psychiatric complaints/AMS. Patient was found wandering and is not cooperative, initially admitted for hyperglycemia and psych CL team at transferring facility reported the patient was endorsing suicidal ideation. Pt transferred for [facility] on 2PC due to concerns that patient was a danger to herself.    While on the unit, the patient has been minimally participatory with treatment team. On arrival 11/30/23, the pt spoke for a few minutes with admitting attending but today unwilling to participate. Pt has limited PPHx in chart, but reportedly has a hx of schizophrenia and taking Seroquel 200 mg nightly but unable to confirm. Collateral information from the patient's sister and [facility] will attempt to obtain, given that the patient does not appear to have the capacity to consent for obtaining collateral information. Currently the working diagnosis is decompensated schizophrenia in the context of medication non-adherence. Plan to rule out organic brain disease with CT head without contrast, but pt likely unable to tolerate. Labs and EKG prior to transfer were not concerning. Pt has taken her Seroquel while on the unit, but patient may benefit from alternative psychotropic medication - will need additional PPHx before adjusting.     12/2 update: pt adherent with Seroquel, refusing to engage with writer and exhibiting some bizarre behaviors     12/3: I spoke to pt in Dutch. In contrast to yesterday, when she was licking the wall and telling the moonlighter she was the devil, she was much friendlier to me and spent much more time talking to me. Gave consent for me to speak with sisters if I can find their numbers. Internally preoccupied and staring at the wall when I first came in but then engaged in conversation. Will increase seroquel to 75/200.    12/4: Pt pleasant, keeping to self, but politely asked to wait until tomorrow to talk    Plan:  1. Continue inpatient psychiatric admission on 2PC legal status - Pt unable to care for self, putting her at risk to harm herself and reports of suicidal ideation  2. Continue Seroquel 200 mg nightly for psychosis and 75 mg daily for psychosis. Consider alternative antipsychotic medications pending additional PPHx from collateral  3. Haldol 5 mg Q6H PRN for agitation  4. Ativan 1 mg po Q6H PRN for agitation  5. Benadryl 50 mg po Q6H PRN for agitation  6. Melatonin 5 mg nightly for insomnia  7. Continue diabetic regimen as previously prescribed

## 2023-12-05 LAB
GLUCOSE BLDC GLUCOMTR-MCNC: 117 MG/DL — HIGH (ref 70–99)
GLUCOSE BLDC GLUCOMTR-MCNC: 117 MG/DL — HIGH (ref 70–99)
GLUCOSE BLDC GLUCOMTR-MCNC: 152 MG/DL — HIGH (ref 70–99)
GLUCOSE BLDC GLUCOMTR-MCNC: 152 MG/DL — HIGH (ref 70–99)
GLUCOSE BLDC GLUCOMTR-MCNC: 254 MG/DL — HIGH (ref 70–99)
GLUCOSE BLDC GLUCOMTR-MCNC: 254 MG/DL — HIGH (ref 70–99)

## 2023-12-05 PROCEDURE — 99232 SBSQ HOSP IP/OBS MODERATE 35: CPT

## 2023-12-05 RX ORDER — QUETIAPINE FUMARATE 200 MG/1
100 TABLET, FILM COATED ORAL DAILY
Refills: 0 | Status: DISCONTINUED | OUTPATIENT
Start: 2023-12-06 | End: 2023-12-07

## 2023-12-05 RX ADMIN — Medication 3: at 22:50

## 2023-12-05 RX ADMIN — Medication 1: at 12:09

## 2023-12-05 RX ADMIN — Medication 10 UNIT(S): at 12:13

## 2023-12-05 RX ADMIN — QUETIAPINE FUMARATE 200 MILLIGRAM(S): 200 TABLET, FILM COATED ORAL at 22:19

## 2023-12-05 RX ADMIN — Medication 10 UNIT(S): at 17:04

## 2023-12-05 RX ADMIN — Medication 5 MILLIGRAM(S): at 22:19

## 2023-12-05 RX ADMIN — QUETIAPINE FUMARATE 75 MILLIGRAM(S): 200 TABLET, FILM COATED ORAL at 12:09

## 2023-12-05 RX ADMIN — INSULIN GLARGINE 18 UNIT(S): 100 INJECTION, SOLUTION SUBCUTANEOUS at 22:49

## 2023-12-05 NOTE — BH INPATIENT PSYCHIATRY PROGRESS NOTE - NSBHATTESTBILLING_PSY_A_CORE
68297-Jzaayhwnts OBS or IP - moderate complexity OR 35-49 mins 43525-Jlnbxigkel OBS or IP - moderate complexity OR 35-49 mins

## 2023-12-05 NOTE — BH INPATIENT PSYCHIATRY PROGRESS NOTE - NSBHMSESPEECH_PSY_A_CORE
pt did not respond to most questions, minimal speech, said only 3 words during encounter. normal volume, rate pt did not respond to most questions, minimal speech, said only 3 words during encounter. normal volume, rate/Normal volume, rate, productivity, spontaneity and articulation

## 2023-12-05 NOTE — BH INPATIENT PSYCHIATRY PROGRESS NOTE - NSTXPSYCHOINTERMD_PSY_ALL_CORE
seroquel, speak with [facility] ACT Team seroquel, speak with [facility] ACT Team

## 2023-12-05 NOTE — BH INPATIENT PSYCHIATRY PROGRESS NOTE - NSBHFUPINTERVALHXFT_PSY_A_CORE
Offered to talk with the patient this morning with the . The patient declined stating that she will talk to the Attending psychiatrist that speaks Dutch (Dr. Ann). Pt discussed with Dr. Ann and he states that on evaluation today the patient has been showing significant improvement of her affect. She has appeared more linear and organized. He states that she has been taking her medication as prescribed and tolerating it well without any side effects. Significantly improved judgment and no behavioral concerns reported overnight. Per morning report, the patient requested IM medication last night. Offered to talk with the patient this morning with the . The patient declined stating that she will talk to the Attending psychiatrist that speaks Swedish (Dr. Ann). Pt discussed with Dr. Ann and he states that on evaluation today the patient has been showing significant improvement of her affect. She has appeared more linear and organized. He states that she has been taking her medication as prescribed and tolerating it well without any side effects. Significantly improved judgment and no behavioral concerns reported overnight. Per morning report, the patient requested IM medication last night.

## 2023-12-05 NOTE — BH INPATIENT PSYCHIATRY PROGRESS NOTE - NSBHASSESSSUMMFT_PSY_ALL_CORE
This is a 53-y.o. HF patient, [place]-speaking, with reported history of schizophrenia, diabetes is presenting due to possible psychiatric complaints/AMS. Patient was found wandering and is not cooperative, initially admitted for hyperglycemia and psych CL team at transferring facility reported the patient was endorsing suicidal ideation. Pt transferred for [facility] on 2PC due to concerns that patient was a danger to herself.    While on the unit, the patient has been minimally participatory with treatment team. On arrival 11/30/23, the pt spoke for a few minutes with admitting attending but today unwilling to participate. Pt has limited PPHx in chart, but reportedly has a hx of schizophrenia and taking Seroquel 200 mg nightly but unable to confirm. Collateral information from the patient's sister and PSYCKES will attempt to obtain, given that the patient does not appear to have the capacity to consent for obtaining collateral information. Currently the working diagnosis is decompensated schizophrenia in the context of medication non-adherence. Plan to rule out organic brain disease with CT head without contrast, but pt likely unable to tolerate. Labs and EKG prior to transfer were not concerning. Pt has taken her Seroquel while on the unit, but patient may benefit from alternative psychotropic medication - will need additional PPHx before adjusting.     12/2 update: pt adherent with Seroquel, refusing to engage with writer and exhibiting some bizarre behaviors     12/3: I spoke to pt in [place]. In contrast to yesterday, when she was licking the wall and telling the moonlighter she was the devil, she was much friendlier to me and spent much more time talking to me. Gave consent for me to speak with sisters if I can find their numbers. Internally preoccupied and staring at the wall when I first came in but then engaged in conversation. Will increase seroquel to 75/200.    12/4: Pt pleasant, keeping to self, but politely asked to wait until tomorrow to talk    Plan:  1. Continue inpatient psychiatric admission on 2PC legal status - Pt unable to care for self, putting her at risk to harm herself and reports of suicidal ideation  2. Continue Seroquel 200 mg nightly for psychosis and 75 mg daily for psychosis. Consider alternative antipsychotic medications pending additional PPHx from collateral  3. Haldol 5 mg Q6H PRN for agitation  4. Ativan 1 mg po Q6H PRN for agitation  5. Benadryl 50 mg po Q6H PRN for agitation  6. Melatonin 5 mg nightly for insomnia  7. Continue diabetic regimen as previously prescribed This is a 53-y.o. HF patient, [place]-speaking, with reported history of schizophrenia, diabetes is presenting due to possible psychiatric complaints/AMS. Patient was found wandering and is not cooperative, initially admitted for hyperglycemia and psych CL team at transferring facility reported the patient was endorsing suicidal ideation. Pt transferred for [facility] on 2PC due to concerns that patient was a danger to herself.    While on the unit, the patient has been minimally participatory with treatment team. On arrival 11/30/23, the pt spoke for a few minutes with admitting attending but today unwilling to participate. Pt has limited PPHx in chart, but reportedly has a hx of schizophrenia and taking Seroquel 200 mg nightly but unable to confirm. Collateral information from the patient's sister and PSYCKES will attempt to obtain, given that the patient does not appear to have the capacity to consent for obtaining collateral information. Currently the working diagnosis is decompensated schizophrenia in the context of medication non-adherence. Plan to rule out organic brain disease with CT head without contrast, but pt likely unable to tolerate. Labs and EKG prior to transfer were not concerning. Pt has taken her Seroquel while on the unit, but patient may benefit from alternative psychotropic medication - will need additional PPHx before adjusting.     12/2 update: pt adherent with Seroquel, refusing to engage with writer and exhibiting some bizarre behaviors     12/3: I spoke to pt in [place]. In contrast to yesterday, when she was licking the wall and telling the moonlighter she was the devil, she was much friendlier to me and spent much more time talking to me. Gave consent for me to speak with sisters if I can find their numbers. Internally preoccupied and staring at the wall when I first came in but then engaged in conversation. Will increase seroquel to 75/200.    12/4: Pt pleasant, keeping to self, but politely asked to wait until tomorrow to talk    Plan:  1. Continue inpatient psychiatric admission on 2PC legal status - Pt unable to care for self, putting her at risk to harm herself and reports of suicidal ideation  2. Continue Seroquel 200 mg nightly for psychosis and 75 mg daily for psychosis. Consider alternative antipsychotic medications pending additional PPHx from collateral  3. Haldol 5 mg Q6H PRN for agitation  4. Ativan 1 mg po Q6H PRN for agitation  5. Benadryl 50 mg po Q6H PRN for agitation  6. Melatonin 5 mg nightly for insomnia  7. Continue diabetic regimen as previously prescribed This is a 53-y.o. HF patient, Ukrainian-speaking, with reported history of schizophrenia, diabetes is presenting due to possible psychiatric complaints/AMS. Patient was found wandering and is not cooperative, initially admitted for hyperglycemia and psych CL team at transferring facility reported the patient was endorsing suicidal ideation. Pt transferred for [facility] on 2PC due to concerns that patient was a danger to herself.    While on the unit, the patient has been minimally participatory with treatment team. On arrival 11/30/23, the pt spoke for a few minutes with admitting attending but today unwilling to participate. Pt has limited PPHx in chart, but reportedly has a hx of schizophrenia and taking Seroquel 200 mg nightly but unable to confirm. Collateral information from the patient's sister and PSYCKES will attempt to obtain, given that the patient does not appear to have the capacity to consent for obtaining collateral information. Currently the working diagnosis is decompensated schizophrenia in the context of medication non-adherence. Plan to rule out organic brain disease with CT head without contrast, but pt likely unable to tolerate. Labs and EKG prior to transfer were not concerning. Pt has taken her Seroquel while on the unit, but patient may benefit from alternative psychotropic medication - will need additional PPHx before adjusting.     12/2 update: pt adherent with Seroquel, refusing to engage with writer and exhibiting some bizarre behaviors     12/3: I spoke to pt in Ukrainian. In contrast to yesterday, when she was licking the wall and telling the moonlighter she was the devil, she was much friendlier to me and spent much more time talking to me. Gave consent for me to speak with sisters if I can find their numbers. Internally preoccupied and staring at the wall when I first came in but then engaged in conversation. Will increase seroquel to 75/200.    12/4: Pt pleasant, keeping to self, but politely asked to wait until tomorrow to talk    12/5: Attempted to call patient's mental health shelter - [facility] (Stephie Nicole -  - [phone]), Director - [facility] [phone], [phone]    Plan:  1. Continue inpatient psychiatric admission on 2PC legal status - Pt unable to care for self, putting her at risk to harm herself and reports of suicidal ideation  2. Continue Seroquel 200 mg nightly for psychosis and 75 mg daily for psychosis. Consider alternative antipsychotic medications pending additional PPHx from collateral  3. Haldol 5 mg Q6H PRN for agitation  4. Ativan 1 mg po Q6H PRN for agitation  5. Benadryl 50 mg po Q6H PRN for agitation  6. Melatonin 5 mg nightly for insomnia  7. Continue diabetic regimen as previously prescribed This is a 53-y.o. HF patient, Citizen of [place]-speaking, with reported history of schizophrenia, diabetes is presenting due to possible psychiatric complaints/AMS. Patient was found wandering and is not cooperative, initially admitted for hyperglycemia and psych CL team at transferring facility reported the patient was endorsing suicidal ideation. Pt transferred for [facility] on 2PC due to concerns that patient was a danger to herself.    While on the unit, the patient has been minimally participatory with treatment team. On arrival 11/30/23, the pt spoke for a few minutes with admitting attending but today unwilling to participate. Pt has limited PPHx in chart, but reportedly has a hx of schizophrenia and taking Seroquel 200 mg nightly but unable to confirm. Collateral information from the patient's sister and PSYCKES will attempt to obtain, given that the patient does not appear to have the capacity to consent for obtaining collateral information. Currently the working diagnosis is decompensated schizophrenia in the context of medication non-adherence. Plan to rule out organic brain disease with CT head without contrast, but pt likely unable to tolerate. Labs and EKG prior to transfer were not concerning. Pt has taken her Seroquel while on the unit, but patient may benefit from alternative psychotropic medication - will need additional PPHx before adjusting.     12/2 update: pt adherent with Seroquel, refusing to engage with writer and exhibiting some bizarre behaviors     12/3: I spoke to pt in Citizen of [place]. In contrast to yesterday, when she was licking the wall and telling the moonlighter she was the devil, she was much friendlier to me and spent much more time talking to me. Gave consent for me to speak with sisters if I can find their numbers. Internally preoccupied and staring at the wall when I first came in but then engaged in conversation. Will increase seroquel to 75/200.    12/4: Pt pleasant, keeping to self, but politely asked to wait until tomorrow to talk    12/5: Attempted to call patient's mental health shelter - [facility] (Stephie Nicole -  - [phone]), Director - [facility] [phone], [phone]    Plan:  1. Continue inpatient psychiatric admission on 2PC legal status - Pt unable to care for self, putting her at risk to harm herself and reports of suicidal ideation  2. Continue Seroquel 200 mg nightly for psychosis and 75 mg daily for psychosis. Consider alternative antipsychotic medications pending additional PPHx from collateral  3. Haldol 5 mg Q6H PRN for agitation  4. Ativan 1 mg po Q6H PRN for agitation  5. Benadryl 50 mg po Q6H PRN for agitation  6. Melatonin 5 mg nightly for insomnia  7. Continue diabetic regimen as previously prescribed This is a 53-y.o. HF patient, [place]-speaking, with reported history of schizophrenia, diabetes is presenting due to possible psychiatric complaints/AMS. Patient was found wandering and is not cooperative, initially admitted for hyperglycemia and psych CL team at transferring facility reported the patient was endorsing suicidal ideation. Pt transferred for [facility] on 2PC due to concerns that patient was a danger to herself.    While on the unit, the patient has been minimally participatory with treatment team. On arrival 11/30/23, the pt spoke for a few minutes with admitting attending but today unwilling to participate. Pt has limited PPHx in chart, but reportedly has a hx of schizophrenia and taking Seroquel 200 mg nightly but unable to confirm. Collateral information from the patient's sister and PSYCKES will attempt to obtain, given that the patient does not appear to have the capacity to consent for obtaining collateral information. Currently the working diagnosis is decompensated schizophrenia in the context of medication non-adherence. Plan to rule out organic brain disease with CT head without contrast, but pt likely unable to tolerate. Labs and EKG prior to transfer were not concerning. Pt has taken her Seroquel while on the unit, but patient may benefit from alternative psychotropic medication - will need additional PPHx before adjusting.     12/2 update: pt adherent with Seroquel, refusing to engage with writer and exhibiting some bizarre behaviors     12/3: I spoke to pt in [place]. In contrast to yesterday, when she was licking the wall and telling the moonlighter she was the devil, she was much friendlier to me and spent much more time talking to me. Gave consent for me to speak with sisters if I can find their numbers. Internally preoccupied and staring at the wall when I first came in but then engaged in conversation. Will increase seroquel to 75/200.    12/4: Pt pleasant, keeping to self, but politely asked to wait until tomorrow to talk    12/5: With pt's consent - Attempted to call patient's mental health shelter - [facility] (Stephie Nicole -  - [phone]), Director - [facility] [phone], [phone]. Pt has shown notable improvement in her affect, her behavior is more organized and able to have linear conversation. Pt has been taking her medications as prescribed.     Plan:  1. Continue inpatient psychiatric admission on 2PC legal status - Pt unable to care for self, putting her at risk to harm herself and reports of suicidal ideation  2. Continue Seroquel 200 mg nightly for psychosis and 75 mg daily for psychosis. Consider alternative antipsychotic medications pending additional PPHx from collateral  3. Haldol 5 mg Q6H PRN for agitation  4. Ativan 1 mg po Q6H PRN for agitation  5. Benadryl 50 mg po Q6H PRN for agitation  6. Melatonin 5 mg nightly for insomnia  7. Continue diabetic regimen as previously prescribed This is a 53-y.o. HF patient, Citizen of [place]-speaking, with reported history of schizophrenia, diabetes is presenting due to possible psychiatric complaints/AMS. Patient was found wandering and is not cooperative, initially admitted for hyperglycemia and psych CL team at transferring facility reported the patient was endorsing suicidal ideation. Pt transferred for [facility] on 2PC due to concerns that patient was a danger to herself.    While on the unit, the patient has been minimally participatory with treatment team. On arrival 11/30/23, the pt spoke for a few minutes with admitting attending but today unwilling to participate. Pt has limited PPHx in chart, but reportedly has a hx of schizophrenia and taking Seroquel 200 mg nightly but unable to confirm. Collateral information from the patient's sister and PSYCKES will attempt to obtain, given that the patient does not appear to have the capacity to consent for obtaining collateral information. Currently the working diagnosis is decompensated schizophrenia in the context of medication non-adherence. Plan to rule out organic brain disease with CT head without contrast, but pt likely unable to tolerate. Labs and EKG prior to transfer were not concerning. Pt has taken her Seroquel while on the unit, but patient may benefit from alternative psychotropic medication - will need additional PPHx before adjusting.     12/2 update: pt adherent with Seroquel, refusing to engage with writer and exhibiting some bizarre behaviors     12/3: I spoke to pt in Citizen of [place]. In contrast to yesterday, when she was licking the wall and telling the moonlighter she was the devil, she was much friendlier to me and spent much more time talking to me. Gave consent for me to speak with sisters if I can find their numbers. Internally preoccupied and staring at the wall when I first came in but then engaged in conversation. Will increase seroquel to 75/200.    12/4: Pt pleasant, keeping to self, but politely asked to wait until tomorrow to talk    12/5: With pt's consent - Attempted to call patient's mental health shelter - [facility] (Stephie Nicole -  - [phone]), Director - [facility] [phone], [phone]. Pt has shown notable improvement in her affect, her behavior is more organized and able to have linear conversation. Pt has been taking her medications as prescribed.     Plan:  1. Continue inpatient psychiatric admission on 2PC legal status - Pt unable to care for self, putting her at risk to harm herself and reports of suicidal ideation  2. Continue Seroquel 200 mg nightly for psychosis and 75 mg daily for psychosis. Consider alternative antipsychotic medications pending additional PPHx from collateral  3. Haldol 5 mg Q6H PRN for agitation  4. Ativan 1 mg po Q6H PRN for agitation  5. Benadryl 50 mg po Q6H PRN for agitation  6. Melatonin 5 mg nightly for insomnia  7. Continue diabetic regimen as previously prescribed This is a 53-y.o. HF patient, [place]-speaking, with reported history of schizophrenia, diabetes is presenting due to possible psychiatric complaints/AMS. Patient was found wandering and is not cooperative, initially admitted for hyperglycemia and psych CL team at transferring facility reported the patient was endorsing suicidal ideation. Pt transferred for [facility] on 2PC due to concerns that patient was a danger to herself.    While on the unit, the patient has been minimally participatory with treatment team. On arrival 11/30/23, the pt spoke for a few minutes with admitting attending but today unwilling to participate. Pt has limited PPHx in chart, but reportedly has a hx of schizophrenia and taking Seroquel 200 mg nightly but unable to confirm. Collateral information from the patient's sister and PSYCKES will attempt to obtain, given that the patient does not appear to have the capacity to consent for obtaining collateral information. Currently the working diagnosis is decompensated schizophrenia in the context of medication non-adherence. Plan to rule out organic brain disease with CT head without contrast, but pt likely unable to tolerate. Labs and EKG prior to transfer were not concerning. Pt has taken her Seroquel while on the unit, but patient may benefit from alternative psychotropic medication - will need additional PPHx before adjusting.     12/2 update: pt adherent with Seroquel, refusing to engage with writer and exhibiting some bizarre behaviors     12/3: I spoke to pt in [place]. In contrast to yesterday, when she was licking the wall and telling the moonlighter she was the devil, she was much friendlier to me and spent much more time talking to me. Gave consent for me to speak with sisters if I can find their numbers. Internally preoccupied and staring at the wall when I first came in but then engaged in conversation. Will increase seroquel to 75/200.    12/4: Pt pleasant, keeping to self, but politely asked to wait until tomorrow to talk    12/5: With pt's consent - Attempted to call patient's mental health shelter - [facility] (Stephie Nicole -  - [phone]), Director - [facility] [phone], [phone]. Pt has shown notable improvement in her affect, her behavior is more organized and able to have linear conversation. Pt has been taking her medications as prescribed. Will increase her AM Seroquel dose to 100 mg    Plan:  1. Continue inpatient psychiatric admission on 2PC legal status - Pt unable to care for self, putting her at risk to harm herself and reports of suicidal ideation  2. Continue Seroquel 200 mg nightly for psychosis and 100 mg daily for psychosis. Consider alternative antipsychotic medications pending additional PPHx from collateral  3. Haldol 5 mg Q6H PRN for agitation  4. Ativan 1 mg po Q6H PRN for agitation  5. Benadryl 50 mg po Q6H PRN for agitation  6. Melatonin 5 mg nightly for insomnia  7. Continue diabetic regimen as previously prescribed This is a 53-y.o. HF patient, Armenian-speaking, with reported history of schizophrenia, diabetes is presenting due to possible psychiatric complaints/AMS. Patient was found wandering and is not cooperative, initially admitted for hyperglycemia and psych CL team at transferring facility reported the patient was endorsing suicidal ideation. Pt transferred for [facility] on 2PC due to concerns that patient was a danger to herself.    While on the unit, the patient has been minimally participatory with treatment team. On arrival 11/30/23, the pt spoke for a few minutes with admitting attending but today unwilling to participate. Pt has limited PPHx in chart, but reportedly has a hx of schizophrenia and taking Seroquel 200 mg nightly but unable to confirm. Collateral information from the patient's sister and PSYCKES will attempt to obtain, given that the patient does not appear to have the capacity to consent for obtaining collateral information. Currently the working diagnosis is decompensated schizophrenia in the context of medication non-adherence. Plan to rule out organic brain disease with CT head without contrast, but pt likely unable to tolerate. Labs and EKG prior to transfer were not concerning. Pt has taken her Seroquel while on the unit, but patient may benefit from alternative psychotropic medication - will need additional PPHx before adjusting.     12/2 update: pt adherent with Seroquel, refusing to engage with writer and exhibiting some bizarre behaviors     12/3: I spoke to pt in Armenian. In contrast to yesterday, when she was licking the wall and telling the moonlighter she was the devil, she was much friendlier to me and spent much more time talking to me. Gave consent for me to speak with sisters if I can find their numbers. Internally preoccupied and staring at the wall when I first came in but then engaged in conversation. Will increase seroquel to 75/200.    12/4: Pt pleasant, keeping to self, but politely asked to wait until tomorrow to talk    12/5: With pt's consent - Attempted to call patient's mental health shelter - [facility] (Stephie Nicole -  - [phone]), Director - [facility] [phone], [phone]. Pt has shown notable improvement in her affect, her behavior is more organized and able to have linear conversation. Pt has been taking her medications as prescribed. Will increase her AM Seroquel dose to 100 mg    Plan:  1. Continue inpatient psychiatric admission on 2PC legal status - Pt unable to care for self, putting her at risk to harm herself and reports of suicidal ideation  2. Continue Seroquel 200 mg nightly for psychosis and 100 mg daily for psychosis. Consider alternative antipsychotic medications pending additional PPHx from collateral  3. Haldol 5 mg Q6H PRN for agitation  4. Ativan 1 mg po Q6H PRN for agitation  5. Benadryl 50 mg po Q6H PRN for agitation  6. Melatonin 5 mg nightly for insomnia  7. Continue diabetic regimen as previously prescribed

## 2023-12-05 NOTE — BH INPATIENT PSYCHIATRY PROGRESS NOTE - NSBHMETABOLIC_PSY_ALL_CORE_FT
BMI: BMI (kg/m2): 19.5 (11-20-23 @ 16:55)  HbA1c: A1C with Estimated Average Glucose Result: 13.4 % (11-20-23 @ 22:35)    Glucose: POCT Blood Glucose.: 186 mg/dL (12-04-23 @ 22:12)    BP: 123/71 (12-03-23 @ 15:46) (123/71 - 123/71)Vital Signs Last 24 Hrs  T(C): --  T(F): --  HR: --  BP: --  BP(mean): --  RR: --  SpO2: --      Lipid Panel:  BMI: BMI (kg/m2): 19.5 (11-20-23 @ 16:55)  HbA1c: A1C with Estimated Average Glucose Result: 13.4 % (11-20-23 @ 22:35)    Glucose: POCT Blood Glucose.: 152 mg/dL (12-05-23 @ 12:07)    BP: 123/71 (12-03-23 @ 15:46) (123/71 - 123/71)Vital Signs Last 24 Hrs  T(C): --  T(F): --  HR: --  BP: --  BP(mean): --  RR: --  SpO2: --      Lipid Panel:  BMI: BMI (kg/m2): 19.5 (11-20-23 @ 16:55)  HbA1c: A1C with Estimated Average Glucose Result: 13.4 % (11-20-23 @ 22:35)    Glucose: POCT Blood Glucose.: 103 mg/dL (12-19-23 @ 07:51)    BP: 117/80 (12-19-23 @ 08:25) (116/75 - 131/75)Vital Signs Last 24 Hrs  T(C): 37.1 (12-19-23 @ 08:25), Max: 37.2 (12-18-23 @ 16:30)  T(F): 98.8 (12-19-23 @ 08:25), Max: 98.9 (12-18-23 @ 16:30)  HR: 100 (12-19-23 @ 08:25) (93 - 100)  BP: 117/80 (12-19-23 @ 08:25) (117/80 - 131/75)  BP(mean): --  RR: --  SpO2: 99% (12-19-23 @ 08:25) (96% - 99%)      Lipid Panel:

## 2023-12-05 NOTE — BH INPATIENT PSYCHIATRY PROGRESS NOTE - NSBHATTESTCOMMENTATTENDFT_PSY_A_CORE
I spoke with the patient in Welsh. Improving with seroquel uptitration. She seems to have some memory issues. Pt starting to improve enough that we can offer head CT or other brain imaging. Spoke with pt for 25 min and documented for 10 min, none of which was comprised by teaching.  I spoke with the patient in Romansh. Improving with seroquel uptitration. She seems to have some memory issues. Pt starting to improve enough that we can offer head CT or other brain imaging. Spoke with pt for 25 min and documented for 10 min, none of which was comprised by teaching.

## 2023-12-05 NOTE — BH INPATIENT PSYCHIATRY PROGRESS NOTE - CURRENT MEDICATION
MEDICATIONS  (STANDING):  dextrose 5%. 1000 milliLiter(s) (50 mL/Hr) IV Continuous <Continuous>  dextrose 5%. 1000 milliLiter(s) (100 mL/Hr) IV Continuous <Continuous>  dextrose 50% Injectable 25 Gram(s) IV Push once  dextrose 50% Injectable 12.5 Gram(s) IV Push once  dextrose 50% Injectable 25 Gram(s) IV Push once  glucagon  Injectable 1 milliGRAM(s) IntraMuscular once  insulin glargine Injectable (LANTUS) 18 Unit(s) SubCutaneous at bedtime  insulin lispro (ADMELOG) corrective regimen sliding scale   SubCutaneous Before meals and at bedtime  insulin lispro Injectable (ADMELOG) 6 Unit(s) SubCutaneous before breakfast  insulin lispro Injectable (ADMELOG) 10 Unit(s) SubCutaneous before dinner  insulin lispro Injectable (ADMELOG) 10 Unit(s) SubCutaneous before lunch  melatonin. 5 milliGRAM(s) Oral at bedtime  QUEtiapine 200 milliGRAM(s) Oral at bedtime  QUEtiapine 75 milliGRAM(s) Oral daily    MEDICATIONS  (PRN):  acetaminophen     Tablet .. 650 milliGRAM(s) Oral every 6 hours PRN Moderate Pain (4 - 6)  aluminum hydroxide/magnesium hydroxide/simethicone Suspension 30 milliLiter(s) Oral every 6 hours PRN Dyspepsia  dextrose Oral Gel 15 Gram(s) Oral once PRN Blood Glucose LESS THAN 70 milliGRAM(s)/deciliter  diphenhydrAMINE 50 milliGRAM(s) Oral every 6 hours PRN agitation  haloperidol     Tablet 5 milliGRAM(s) Oral every 6 hours PRN agitation  LORazepam     Tablet 1 milliGRAM(s) Oral every 6 hours PRN agitation  magnesium hydroxide Suspension 30 milliLiter(s) Oral daily PRN Constipation  ondansetron Injectable 4 milliGRAM(s) IntraMuscular every 6 hours PRN nausea4   MEDICATIONS  (STANDING):  dextrose 5%. 1000 milliLiter(s) (50 mL/Hr) IV Continuous <Continuous>  dextrose 5%. 1000 milliLiter(s) (100 mL/Hr) IV Continuous <Continuous>  dextrose 50% Injectable 25 Gram(s) IV Push once  dextrose 50% Injectable 12.5 Gram(s) IV Push once  dextrose 50% Injectable 25 Gram(s) IV Push once  glucagon  Injectable 1 milliGRAM(s) IntraMuscular once  insulin glargine Injectable (LANTUS) 18 Unit(s) SubCutaneous at bedtime  insulin lispro (ADMELOG) corrective regimen sliding scale   SubCutaneous Before meals and at bedtime  insulin lispro Injectable (ADMELOG) 6 Unit(s) SubCutaneous before breakfast  insulin lispro Injectable (ADMELOG) 10 Unit(s) SubCutaneous before dinner  insulin lispro Injectable (ADMELOG) 10 Unit(s) SubCutaneous before lunch  melatonin. 5 milliGRAM(s) Oral at bedtime  QUEtiapine 75 milliGRAM(s) Oral daily  QUEtiapine 200 milliGRAM(s) Oral at bedtime    MEDICATIONS  (PRN):  acetaminophen     Tablet .. 650 milliGRAM(s) Oral every 6 hours PRN Moderate Pain (4 - 6)  aluminum hydroxide/magnesium hydroxide/simethicone Suspension 30 milliLiter(s) Oral every 6 hours PRN Dyspepsia  dextrose Oral Gel 15 Gram(s) Oral once PRN Blood Glucose LESS THAN 70 milliGRAM(s)/deciliter  diphenhydrAMINE 50 milliGRAM(s) Oral every 6 hours PRN agitation  haloperidol     Tablet 5 milliGRAM(s) Oral every 6 hours PRN agitation  LORazepam     Tablet 1 milliGRAM(s) Oral every 6 hours PRN agitation  magnesium hydroxide Suspension 30 milliLiter(s) Oral daily PRN Constipation  ondansetron Injectable 4 milliGRAM(s) IntraMuscular every 6 hours PRN nausea4   MEDICATIONS  (STANDING):  dextrose 5%. 1000 milliLiter(s) (50 mL/Hr) IV Continuous <Continuous>  dextrose 5%. 1000 milliLiter(s) (100 mL/Hr) IV Continuous <Continuous>  dextrose 50% Injectable 12.5 Gram(s) IV Push once  dextrose 50% Injectable 25 Gram(s) IV Push once  dextrose 50% Injectable 25 Gram(s) IV Push once  glucagon  Injectable 1 milliGRAM(s) IntraMuscular once  insulin glargine Injectable (LANTUS) 18 Unit(s) SubCutaneous at bedtime  insulin lispro (ADMELOG) corrective regimen sliding scale   SubCutaneous Before meals and at bedtime  insulin lispro Injectable (ADMELOG) 6 Unit(s) SubCutaneous before breakfast  insulin lispro Injectable (ADMELOG) 10 Unit(s) SubCutaneous before dinner  insulin lispro Injectable (ADMELOG) 10 Unit(s) SubCutaneous before lunch  melatonin. 5 milliGRAM(s) Oral at bedtime  QUEtiapine 75 milliGRAM(s) Oral daily  QUEtiapine 200 milliGRAM(s) Oral at bedtime    MEDICATIONS  (PRN):  acetaminophen     Tablet .. 650 milliGRAM(s) Oral every 6 hours PRN Moderate Pain (4 - 6)  aluminum hydroxide/magnesium hydroxide/simethicone Suspension 30 milliLiter(s) Oral every 6 hours PRN Dyspepsia  dextrose Oral Gel 15 Gram(s) Oral once PRN Blood Glucose LESS THAN 70 milliGRAM(s)/deciliter  diphenhydrAMINE 50 milliGRAM(s) Oral every 6 hours PRN agitation  haloperidol     Tablet 5 milliGRAM(s) Oral every 6 hours PRN agitation  LORazepam     Tablet 1 milliGRAM(s) Oral every 6 hours PRN agitation  magnesium hydroxide Suspension 30 milliLiter(s) Oral daily PRN Constipation  ondansetron Injectable 4 milliGRAM(s) IntraMuscular every 6 hours PRN nausea4   MEDICATIONS  (STANDING):  dextrose 5%. 1000 milliLiter(s) (50 mL/Hr) IV Continuous <Continuous>  dextrose 5%. 1000 milliLiter(s) (100 mL/Hr) IV Continuous <Continuous>  dextrose 50% Injectable 12.5 Gram(s) IV Push once  dextrose 50% Injectable 25 Gram(s) IV Push once  dextrose 50% Injectable 25 Gram(s) IV Push once  glucagon  Injectable 1 milliGRAM(s) IntraMuscular once  insulin glargine Injectable (LANTUS) 18 Unit(s) SubCutaneous at bedtime  insulin lispro (ADMELOG) corrective regimen sliding scale   SubCutaneous Before meals and at bedtime  insulin lispro Injectable (ADMELOG) 6 Unit(s) SubCutaneous before breakfast  insulin lispro Injectable (ADMELOG) 10 Unit(s) SubCutaneous before dinner  insulin lispro Injectable (ADMELOG) 10 Unit(s) SubCutaneous before lunch  melatonin. 5 milliGRAM(s) Oral at bedtime  QUEtiapine 200 milliGRAM(s) Oral at bedtime    MEDICATIONS  (PRN):  acetaminophen     Tablet .. 650 milliGRAM(s) Oral every 6 hours PRN Moderate Pain (4 - 6)  aluminum hydroxide/magnesium hydroxide/simethicone Suspension 30 milliLiter(s) Oral every 6 hours PRN Dyspepsia  dextrose Oral Gel 15 Gram(s) Oral once PRN Blood Glucose LESS THAN 70 milliGRAM(s)/deciliter  diphenhydrAMINE 50 milliGRAM(s) Oral every 6 hours PRN agitation  haloperidol     Tablet 5 milliGRAM(s) Oral every 6 hours PRN agitation  LORazepam     Tablet 1 milliGRAM(s) Oral every 6 hours PRN agitation  magnesium hydroxide Suspension 30 milliLiter(s) Oral daily PRN Constipation  ondansetron Injectable 4 milliGRAM(s) IntraMuscular every 6 hours PRN nausea4   MEDICATIONS  (STANDING):  dextrose 5%. 1000 milliLiter(s) (50 mL/Hr) IV Continuous <Continuous>  dextrose 5%. 1000 milliLiter(s) (100 mL/Hr) IV Continuous <Continuous>  dextrose 50% Injectable 12.5 Gram(s) IV Push once  dextrose 50% Injectable 25 Gram(s) IV Push once  dextrose 50% Injectable 25 Gram(s) IV Push once  glucagon  Injectable 1 milliGRAM(s) IntraMuscular once  insulin glargine Injectable (LANTUS) 18 Unit(s) SubCutaneous at bedtime  insulin lispro (ADMELOG) corrective regimen sliding scale   SubCutaneous Before meals and at bedtime  insulin lispro Injectable (ADMELOG) 6 Unit(s) SubCutaneous before breakfast  insulin lispro Injectable (ADMELOG) 10 Unit(s) SubCutaneous before dinner  insulin lispro Injectable (ADMELOG) 10 Unit(s) SubCutaneous before lunch  melatonin. 5 milliGRAM(s) Oral at bedtime  QUEtiapine 200 milliGRAM(s) Oral at bedtime  QUEtiapine 125 milliGRAM(s) Oral daily    MEDICATIONS  (PRN):  acetaminophen     Tablet .. 650 milliGRAM(s) Oral every 6 hours PRN Moderate Pain (4 - 6)  aluminum hydroxide/magnesium hydroxide/simethicone Suspension 30 milliLiter(s) Oral every 6 hours PRN Dyspepsia  dextrose Oral Gel 15 Gram(s) Oral once PRN Blood Glucose LESS THAN 70 milliGRAM(s)/deciliter  diphenhydrAMINE 50 milliGRAM(s) Oral every 6 hours PRN agitation  haloperidol     Tablet 5 milliGRAM(s) Oral every 6 hours PRN agitation  magnesium hydroxide Suspension 30 milliLiter(s) Oral daily PRN Constipation  ondansetron Injectable 4 milliGRAM(s) IntraMuscular every 6 hours PRN nausea4

## 2023-12-06 LAB
GLUCOSE BLDC GLUCOMTR-MCNC: 182 MG/DL — HIGH (ref 70–99)
GLUCOSE BLDC GLUCOMTR-MCNC: 182 MG/DL — HIGH (ref 70–99)
GLUCOSE BLDC GLUCOMTR-MCNC: 196 MG/DL — HIGH (ref 70–99)
GLUCOSE BLDC GLUCOMTR-MCNC: 196 MG/DL — HIGH (ref 70–99)
GLUCOSE BLDC GLUCOMTR-MCNC: 254 MG/DL — HIGH (ref 70–99)
GLUCOSE BLDC GLUCOMTR-MCNC: 254 MG/DL — HIGH (ref 70–99)

## 2023-12-06 PROCEDURE — 99232 SBSQ HOSP IP/OBS MODERATE 35: CPT

## 2023-12-06 RX ADMIN — Medication 1: at 11:45

## 2023-12-06 RX ADMIN — Medication 10 UNIT(S): at 11:46

## 2023-12-06 RX ADMIN — QUETIAPINE FUMARATE 200 MILLIGRAM(S): 200 TABLET, FILM COATED ORAL at 21:53

## 2023-12-06 RX ADMIN — Medication 10 UNIT(S): at 17:21

## 2023-12-06 RX ADMIN — Medication 1: at 21:54

## 2023-12-06 RX ADMIN — Medication 5 MILLIGRAM(S): at 21:53

## 2023-12-06 RX ADMIN — Medication 3: at 17:20

## 2023-12-06 RX ADMIN — QUETIAPINE FUMARATE 100 MILLIGRAM(S): 200 TABLET, FILM COATED ORAL at 11:42

## 2023-12-06 RX ADMIN — INSULIN GLARGINE 18 UNIT(S): 100 INJECTION, SOLUTION SUBCUTANEOUS at 21:53

## 2023-12-06 NOTE — BH INPATIENT PSYCHIATRY PROGRESS NOTE - CURRENT MEDICATION
MEDICATIONS  (STANDING):  dextrose 5%. 1000 milliLiter(s) (50 mL/Hr) IV Continuous <Continuous>  dextrose 5%. 1000 milliLiter(s) (100 mL/Hr) IV Continuous <Continuous>  dextrose 50% Injectable 25 Gram(s) IV Push once  dextrose 50% Injectable 12.5 Gram(s) IV Push once  dextrose 50% Injectable 25 Gram(s) IV Push once  glucagon  Injectable 1 milliGRAM(s) IntraMuscular once  insulin glargine Injectable (LANTUS) 18 Unit(s) SubCutaneous at bedtime  insulin lispro (ADMELOG) corrective regimen sliding scale   SubCutaneous Before meals and at bedtime  insulin lispro Injectable (ADMELOG) 6 Unit(s) SubCutaneous before breakfast  insulin lispro Injectable (ADMELOG) 10 Unit(s) SubCutaneous before dinner  insulin lispro Injectable (ADMELOG) 10 Unit(s) SubCutaneous before lunch  melatonin. 5 milliGRAM(s) Oral at bedtime  QUEtiapine 200 milliGRAM(s) Oral at bedtime  QUEtiapine 100 milliGRAM(s) Oral daily    MEDICATIONS  (PRN):  acetaminophen     Tablet .. 650 milliGRAM(s) Oral every 6 hours PRN Moderate Pain (4 - 6)  aluminum hydroxide/magnesium hydroxide/simethicone Suspension 30 milliLiter(s) Oral every 6 hours PRN Dyspepsia  dextrose Oral Gel 15 Gram(s) Oral once PRN Blood Glucose LESS THAN 70 milliGRAM(s)/deciliter  diphenhydrAMINE 50 milliGRAM(s) Oral every 6 hours PRN agitation  haloperidol     Tablet 5 milliGRAM(s) Oral every 6 hours PRN agitation  LORazepam     Tablet 1 milliGRAM(s) Oral every 6 hours PRN agitation  magnesium hydroxide Suspension 30 milliLiter(s) Oral daily PRN Constipation  ondansetron Injectable 4 milliGRAM(s) IntraMuscular every 6 hours PRN nausea4   MEDICATIONS  (STANDING):  dextrose 5%. 1000 milliLiter(s) (50 mL/Hr) IV Continuous <Continuous>  dextrose 5%. 1000 milliLiter(s) (100 mL/Hr) IV Continuous <Continuous>  dextrose 50% Injectable 25 Gram(s) IV Push once  dextrose 50% Injectable 12.5 Gram(s) IV Push once  dextrose 50% Injectable 25 Gram(s) IV Push once  glucagon  Injectable 1 milliGRAM(s) IntraMuscular once  insulin glargine Injectable (LANTUS) 18 Unit(s) SubCutaneous at bedtime  insulin lispro (ADMELOG) corrective regimen sliding scale   SubCutaneous Before meals and at bedtime  insulin lispro Injectable (ADMELOG) 6 Unit(s) SubCutaneous before breakfast  insulin lispro Injectable (ADMELOG) 10 Unit(s) SubCutaneous before dinner  insulin lispro Injectable (ADMELOG) 10 Unit(s) SubCutaneous before lunch  melatonin. 5 milliGRAM(s) Oral at bedtime  QUEtiapine 125 milliGRAM(s) Oral daily  QUEtiapine 200 milliGRAM(s) Oral at bedtime    MEDICATIONS  (PRN):  acetaminophen     Tablet .. 650 milliGRAM(s) Oral every 6 hours PRN Moderate Pain (4 - 6)  aluminum hydroxide/magnesium hydroxide/simethicone Suspension 30 milliLiter(s) Oral every 6 hours PRN Dyspepsia  dextrose Oral Gel 15 Gram(s) Oral once PRN Blood Glucose LESS THAN 70 milliGRAM(s)/deciliter  diphenhydrAMINE 50 milliGRAM(s) Oral every 6 hours PRN agitation  haloperidol     Tablet 5 milliGRAM(s) Oral every 6 hours PRN agitation  magnesium hydroxide Suspension 30 milliLiter(s) Oral daily PRN Constipation  ondansetron Injectable 4 milliGRAM(s) IntraMuscular every 6 hours PRN nausea4   MEDICATIONS  (STANDING):  dextrose 5%. 1000 milliLiter(s) (50 mL/Hr) IV Continuous <Continuous>  dextrose 5%. 1000 milliLiter(s) (100 mL/Hr) IV Continuous <Continuous>  dextrose 50% Injectable 25 Gram(s) IV Push once  dextrose 50% Injectable 12.5 Gram(s) IV Push once  dextrose 50% Injectable 25 Gram(s) IV Push once  glucagon  Injectable 1 milliGRAM(s) IntraMuscular once  insulin glargine Injectable (LANTUS) 18 Unit(s) SubCutaneous at bedtime  insulin lispro (ADMELOG) corrective regimen sliding scale   SubCutaneous Before meals and at bedtime  insulin lispro Injectable (ADMELOG) 10 Unit(s) SubCutaneous before lunch  insulin lispro Injectable (ADMELOG) 6 Unit(s) SubCutaneous before breakfast  insulin lispro Injectable (ADMELOG) 10 Unit(s) SubCutaneous before dinner  melatonin. 5 milliGRAM(s) Oral at bedtime  QUEtiapine 200 milliGRAM(s) Oral at bedtime  QUEtiapine 125 milliGRAM(s) Oral daily    MEDICATIONS  (PRN):  acetaminophen     Tablet .. 650 milliGRAM(s) Oral every 6 hours PRN Moderate Pain (4 - 6)  aluminum hydroxide/magnesium hydroxide/simethicone Suspension 30 milliLiter(s) Oral every 6 hours PRN Dyspepsia  dextrose Oral Gel 15 Gram(s) Oral once PRN Blood Glucose LESS THAN 70 milliGRAM(s)/deciliter  diphenhydrAMINE 50 milliGRAM(s) Oral every 6 hours PRN agitation  haloperidol     Tablet 5 milliGRAM(s) Oral every 6 hours PRN agitation  magnesium hydroxide Suspension 30 milliLiter(s) Oral daily PRN Constipation  ondansetron Injectable 4 milliGRAM(s) IntraMuscular every 6 hours PRN nausea4

## 2023-12-06 NOTE — BH INPATIENT PSYCHIATRY PROGRESS NOTE - NSBHFUPINTERVALHXFT_PSY_A_CORE
No reports of behavioral concerns overnight. Pt no longer requires single room for disorganized behavior. Pt continues to show improved insight and judgement. Pt continues to repeat questions she has already asked to treatment team.

## 2023-12-06 NOTE — BH TREATMENT PLAN - NSTXPSYCHOINTERMD_PSY_ALL_CORE
seroquel, speak with [facility] ACT Team seroquel, speak with [facility] ACT Team

## 2023-12-06 NOTE — BH INPATIENT PSYCHIATRY PROGRESS NOTE - NSBHMSESPEECH_PSY_A_CORE
pt did not respond to most questions, minimal speech, said only 3 words during encounter. normal volume, rate/Normal volume, rate, productivity, spontaneity and articulation

## 2023-12-06 NOTE — BH INPATIENT PSYCHIATRY PROGRESS NOTE - NSBHATTESTBILLING_PSY_A_CORE
02667-[id number] OBS or IP - moderate complexity OR 35-49 mins 49910-[id number] OBS or IP - moderate complexity OR 35-49 mins

## 2023-12-06 NOTE — BH INPATIENT PSYCHIATRY PROGRESS NOTE - NSBHATTESTCOMMENTATTENDFT_PSY_A_CORE
I spoke with pt in Tajik. Pt improving with seroquel uptitration. Giving consent to speak with ACT team and sisters. Trying to get pt to agree to brain imaging. Spoke with pt for 25 min and documented for 10 min, none of which was comprised by teaching. I spoke with pt in [place]. Pt improving with seroquel uptitration. Giving consent to speak with ACT team and sisters. Trying to get pt to agree to brain imaging. Spoke with pt for 25 min and documented for 10 min, none of which was comprised by teaching.

## 2023-12-06 NOTE — BH INPATIENT PSYCHIATRY PROGRESS NOTE - NSTXPSYCHOINTERMD_PSY_ALL_CORE
seroquel, speak with [facility] ACT Team seroquel, speak with [facility] ACT Team

## 2023-12-06 NOTE — BH INPATIENT PSYCHIATRY PROGRESS NOTE - NSBHCHARTREVIEWVS_PSY_A_CORE FT
Vital Signs Last 24 Hrs  T(C): --  T(F): --  HR: --  BP: --  BP(mean): --  RR: --  SpO2: --     Vital Signs Last 24 Hrs  T(C): 37.2 (12-18-23 @ 08:21), Max: 37.2 (12-18-23 @ 08:21)  T(F): 99 (12-18-23 @ 08:21), Max: 99 (12-18-23 @ 08:21)  HR: 94 (12-18-23 @ 08:21) (94 - 94)  BP: 116/75 (12-18-23 @ 08:21) (116/75 - 116/75)  BP(mean): --  RR: --  SpO2: 97% (12-18-23 @ 08:21) (97% - 97%)     Vital Signs Last 24 Hrs  T(C): 37.1 (12-19-23 @ 08:25), Max: 37.2 (12-18-23 @ 16:30)  T(F): 98.8 (12-19-23 @ 08:25), Max: 98.9 (12-18-23 @ 16:30)  HR: 100 (12-19-23 @ 08:25) (93 - 100)  BP: 117/80 (12-19-23 @ 08:25) (117/80 - 131/75)  BP(mean): --  RR: --  SpO2: 99% (12-19-23 @ 08:25) (96% - 99%)

## 2023-12-06 NOTE — BH INPATIENT PSYCHIATRY PROGRESS NOTE - NSBHMETABOLIC_PSY_ALL_CORE_FT
BMI: BMI (kg/m2): 19.5 (11-20-23 @ 16:55)  HbA1c: A1C with Estimated Average Glucose Result: 13.4 % (11-20-23 @ 22:35)    Glucose: POCT Blood Glucose.: 254 mg/dL (12-06-23 @ 17:14)    BP: --Vital Signs Last 24 Hrs  T(C): --  T(F): --  HR: --  BP: --  BP(mean): --  RR: --  SpO2: --      Lipid Panel:  BMI: BMI (kg/m2): 19.5 (11-20-23 @ 16:55)  HbA1c: A1C with Estimated Average Glucose Result: 13.4 % (11-20-23 @ 22:35)    Glucose: POCT Blood Glucose.: 153 mg/dL (12-18-23 @ 07:41)    BP: 116/75 (12-18-23 @ 08:21) (108/70 - 132/69)Vital Signs Last 24 Hrs  T(C): 37.2 (12-18-23 @ 08:21), Max: 37.2 (12-18-23 @ 08:21)  T(F): 99 (12-18-23 @ 08:21), Max: 99 (12-18-23 @ 08:21)  HR: 94 (12-18-23 @ 08:21) (94 - 94)  BP: 116/75 (12-18-23 @ 08:21) (116/75 - 116/75)  BP(mean): --  RR: --  SpO2: 97% (12-18-23 @ 08:21) (97% - 97%)      Lipid Panel:  BMI: BMI (kg/m2): 19.5 (11-20-23 @ 16:55)  HbA1c: A1C with Estimated Average Glucose Result: 13.4 % (11-20-23 @ 22:35)    Glucose: POCT Blood Glucose.: 103 mg/dL (12-19-23 @ 07:51)    BP: 117/80 (12-19-23 @ 08:25) (116/75 - 131/75)Vital Signs Last 24 Hrs  T(C): 37.1 (12-19-23 @ 08:25), Max: 37.2 (12-18-23 @ 16:30)  T(F): 98.8 (12-19-23 @ 08:25), Max: 98.9 (12-18-23 @ 16:30)  HR: 100 (12-19-23 @ 08:25) (93 - 100)  BP: 117/80 (12-19-23 @ 08:25) (117/80 - 131/75)  BP(mean): --  RR: --  SpO2: 99% (12-19-23 @ 08:25) (96% - 99%)      Lipid Panel:

## 2023-12-06 NOTE — BH INPATIENT PSYCHIATRY PROGRESS NOTE - NSBHASSESSSUMMFT_PSY_ALL_CORE
This is a 53-y.o. HF patient, Malaysian-speaking, with reported history of schizophrenia, diabetes is presenting due to possible psychiatric complaints/AMS. Patient was found wandering and is not cooperative, initially admitted for hyperglycemia and psych CL team at transferring facility reported the patient was endorsing suicidal ideation. Pt transferred for [facility] on 2PC due to concerns that patient was a danger to herself.    While on the unit, the patient has been minimally participatory with treatment team. On arrival 11/30/23, the pt spoke for a few minutes with admitting attending but today unwilling to participate. Pt has limited PPHx in chart, but reportedly has a hx of schizophrenia and taking Seroquel 200 mg nightly but unable to confirm. Collateral information from the patient's sister and PSYCKES will attempt to obtain, given that the patient does not appear to have the capacity to consent for obtaining collateral information. Currently the working diagnosis is decompensated schizophrenia in the context of medication non-adherence. Plan to rule out organic brain disease with CT head without contrast, but pt likely unable to tolerate. Labs and EKG prior to transfer were not concerning. Pt has taken her Seroquel while on the unit, but patient may benefit from alternative psychotropic medication - will need additional PPHx before adjusting.     12/2 update: pt adherent with Seroquel, refusing to engage with writer and exhibiting some bizarre behaviors     12/3: I spoke to pt in Malaysian. In contrast to yesterday, when she was licking the wall and telling the moonlighter she was the devil, she was much friendlier to me and spent much more time talking to me. Gave consent for me to speak with sisters if I can find their numbers. Internally preoccupied and staring at the wall when I first came in but then engaged in conversation. Will increase seroquel to 75/200.    12/4: Pt pleasant, keeping to self, but politely asked to wait until tomorrow to talk    12/5: With pt's consent - Attempted to call patient's mental health shelter - [facility] (Stephie Nicole -  - [phone]), Director - [facility] [phone], [phone]. Pt has shown notable improvement in her affect, her behavior is more organized and able to have linear conversation. Pt has been taking her medications as prescribed. Will increase her AM Seroquel dose to 100 mg    12/6/: Pt continues to show improvement on the unit. She has been adherent to her medication regimen and has been display more organized behavior and have linear conversation. Pt does appear to be repeating questions she has already asked the treatment team. Will plan on further workup if patient is able to tolerate. (CT head without contrast vs MRI head)    Plan:  1. Continue inpatient psychiatric admission on [facility] legal status - Pt unable to care for self, putting her at risk to harm herself and reports of suicidal ideation  2. Continue Seroquel 200 mg nightly for psychosis and 100 mg daily for psychosis. Consider alternative antipsychotic medications pending additional PPHx from collateral  3. Haldol 5 mg Q6H PRN for agitation  4. Ativan 1 mg po Q6H PRN for agitation  5. Benadryl 50 mg po Q6H PRN for agitation  6. Melatonin 5 mg nightly for insomnia  7. Continue diabetic regimen as previously prescribed This is a 53-y.o. HF patient, Citizen of the [place]-speaking, with reported history of schizophrenia, diabetes is presenting due to possible psychiatric complaints/AMS. Patient was found wandering and is not cooperative, initially admitted for hyperglycemia and psych CL team at transferring facility reported the patient was endorsing suicidal ideation. Pt transferred for [facility] on 2PC due to concerns that patient was a danger to herself.    While on the unit, the patient has been minimally participatory with treatment team. On arrival 11/30/23, the pt spoke for a few minutes with admitting attending but today unwilling to participate. Pt has limited PPHx in chart, but reportedly has a hx of schizophrenia and taking Seroquel 200 mg nightly but unable to confirm. Collateral information from the patient's sister and PSYCKES will attempt to obtain, given that the patient does not appear to have the capacity to consent for obtaining collateral information. Currently the working diagnosis is decompensated schizophrenia in the context of medication non-adherence. Plan to rule out organic brain disease with CT head without contrast, but pt likely unable to tolerate. Labs and EKG prior to transfer were not concerning. Pt has taken her Seroquel while on the unit, but patient may benefit from alternative psychotropic medication - will need additional PPHx before adjusting.     12/2 update: pt adherent with Seroquel, refusing to engage with writer and exhibiting some bizarre behaviors     12/3: I spoke to pt in Citizen of the [place]. In contrast to yesterday, when she was licking the wall and telling the moonlighter she was the devil, she was much friendlier to me and spent much more time talking to me. Gave consent for me to speak with sisters if I can find their numbers. Internally preoccupied and staring at the wall when I first came in but then engaged in conversation. Will increase seroquel to 75/200.    12/4: Pt pleasant, keeping to self, but politely asked to wait until tomorrow to talk    12/5: With pt's consent - Attempted to call patient's mental health shelter - [facility] (Stephie Nicole -  - [phone]), Director - [facility] [phone], [phone]. Pt has shown notable improvement in her affect, her behavior is more organized and able to have linear conversation. Pt has been taking her medications as prescribed. Will increase her AM Seroquel dose to 100 mg    12/6/: Pt continues to show improvement on the unit. She has been adherent to her medication regimen and has been display more organized behavior and have linear conversation. Pt does appear to be repeating questions she has already asked the treatment team. Will plan on further workup if patient is able to tolerate. (CT head without contrast vs MRI head)    Plan:  1. Continue inpatient psychiatric admission on [facility] legal status - Pt unable to care for self, putting her at risk to harm herself and reports of suicidal ideation  2. Continue Seroquel 200 mg nightly for psychosis and 100 mg daily for psychosis. Consider alternative antipsychotic medications pending additional PPHx from collateral  3. Haldol 5 mg Q6H PRN for agitation  4. Ativan 1 mg po Q6H PRN for agitation  5. Benadryl 50 mg po Q6H PRN for agitation  6. Melatonin 5 mg nightly for insomnia  7. Continue diabetic regimen as previously prescribed

## 2023-12-07 LAB
GLUCOSE BLDC GLUCOMTR-MCNC: 117 MG/DL — HIGH (ref 70–99)
GLUCOSE BLDC GLUCOMTR-MCNC: 117 MG/DL — HIGH (ref 70–99)
GLUCOSE BLDC GLUCOMTR-MCNC: 181 MG/DL — HIGH (ref 70–99)
GLUCOSE BLDC GLUCOMTR-MCNC: 181 MG/DL — HIGH (ref 70–99)
GLUCOSE BLDC GLUCOMTR-MCNC: 267 MG/DL — HIGH (ref 70–99)
GLUCOSE BLDC GLUCOMTR-MCNC: 267 MG/DL — HIGH (ref 70–99)
GLUCOSE BLDC GLUCOMTR-MCNC: 93 MG/DL — SIGNIFICANT CHANGE UP (ref 70–99)
GLUCOSE BLDC GLUCOMTR-MCNC: 93 MG/DL — SIGNIFICANT CHANGE UP (ref 70–99)

## 2023-12-07 PROCEDURE — 99232 SBSQ HOSP IP/OBS MODERATE 35: CPT

## 2023-12-07 RX ORDER — QUETIAPINE FUMARATE 200 MG/1
125 TABLET, FILM COATED ORAL DAILY
Refills: 0 | Status: DISCONTINUED | OUTPATIENT
Start: 2023-12-07 | End: 2023-12-19

## 2023-12-07 RX ADMIN — QUETIAPINE FUMARATE 100 MILLIGRAM(S): 200 TABLET, FILM COATED ORAL at 11:34

## 2023-12-07 RX ADMIN — Medication 5 MILLIGRAM(S): at 21:07

## 2023-12-07 RX ADMIN — Medication 10 UNIT(S): at 11:41

## 2023-12-07 RX ADMIN — INSULIN GLARGINE 18 UNIT(S): 100 INJECTION, SOLUTION SUBCUTANEOUS at 21:06

## 2023-12-07 RX ADMIN — Medication 10 UNIT(S): at 16:54

## 2023-12-07 RX ADMIN — QUETIAPINE FUMARATE 200 MILLIGRAM(S): 200 TABLET, FILM COATED ORAL at 21:07

## 2023-12-07 RX ADMIN — Medication 650 MILLIGRAM(S): at 08:57

## 2023-12-07 RX ADMIN — Medication 3: at 21:05

## 2023-12-07 RX ADMIN — Medication 650 MILLIGRAM(S): at 16:54

## 2023-12-07 RX ADMIN — Medication 1: at 11:41

## 2023-12-07 RX ADMIN — Medication 650 MILLIGRAM(S): at 17:47

## 2023-12-07 RX ADMIN — Medication 6 UNIT(S): at 08:04

## 2023-12-07 RX ADMIN — Medication 650 MILLIGRAM(S): at 07:57

## 2023-12-07 NOTE — BH INPATIENT PSYCHIATRY PROGRESS NOTE - NSBHMSESPEECH_PSY_A_CORE
pt did not respond to most questions, minimal speech, said only 3 words during encounter. normal volume, rate/Normal volume, rate, productivity, spontaneity and articulation 5

## 2023-12-07 NOTE — BH INPATIENT PSYCHIATRY PROGRESS NOTE - CURRENT MEDICATION
MEDICATIONS  (STANDING):  dextrose 5%. 1000 milliLiter(s) (100 mL/Hr) IV Continuous <Continuous>  dextrose 5%. 1000 milliLiter(s) (50 mL/Hr) IV Continuous <Continuous>  dextrose 50% Injectable 25 Gram(s) IV Push once  dextrose 50% Injectable 12.5 Gram(s) IV Push once  dextrose 50% Injectable 25 Gram(s) IV Push once  glucagon  Injectable 1 milliGRAM(s) IntraMuscular once  insulin glargine Injectable (LANTUS) 18 Unit(s) SubCutaneous at bedtime  insulin lispro (ADMELOG) corrective regimen sliding scale   SubCutaneous Before meals and at bedtime  insulin lispro Injectable (ADMELOG) 6 Unit(s) SubCutaneous before breakfast  insulin lispro Injectable (ADMELOG) 10 Unit(s) SubCutaneous before dinner  insulin lispro Injectable (ADMELOG) 10 Unit(s) SubCutaneous before lunch  melatonin. 5 milliGRAM(s) Oral at bedtime  QUEtiapine 200 milliGRAM(s) Oral at bedtime  QUEtiapine 125 milliGRAM(s) Oral daily    MEDICATIONS  (PRN):  acetaminophen     Tablet .. 650 milliGRAM(s) Oral every 6 hours PRN Moderate Pain (4 - 6)  aluminum hydroxide/magnesium hydroxide/simethicone Suspension 30 milliLiter(s) Oral every 6 hours PRN Dyspepsia  dextrose Oral Gel 15 Gram(s) Oral once PRN Blood Glucose LESS THAN 70 milliGRAM(s)/deciliter  diphenhydrAMINE 50 milliGRAM(s) Oral every 6 hours PRN agitation  haloperidol     Tablet 5 milliGRAM(s) Oral every 6 hours PRN agitation  LORazepam     Tablet 1 milliGRAM(s) Oral every 6 hours PRN agitation  magnesium hydroxide Suspension 30 milliLiter(s) Oral daily PRN Constipation  ondansetron Injectable 4 milliGRAM(s) IntraMuscular every 6 hours PRN nausea4   MEDICATIONS  (STANDING):  dextrose 5%. 1000 milliLiter(s) (50 mL/Hr) IV Continuous <Continuous>  dextrose 5%. 1000 milliLiter(s) (100 mL/Hr) IV Continuous <Continuous>  dextrose 50% Injectable 25 Gram(s) IV Push once  dextrose 50% Injectable 12.5 Gram(s) IV Push once  dextrose 50% Injectable 25 Gram(s) IV Push once  glucagon  Injectable 1 milliGRAM(s) IntraMuscular once  insulin glargine Injectable (LANTUS) 18 Unit(s) SubCutaneous at bedtime  insulin lispro (ADMELOG) corrective regimen sliding scale   SubCutaneous Before meals and at bedtime  insulin lispro Injectable (ADMELOG) 6 Unit(s) SubCutaneous before breakfast  insulin lispro Injectable (ADMELOG) 10 Unit(s) SubCutaneous before dinner  insulin lispro Injectable (ADMELOG) 10 Unit(s) SubCutaneous before lunch  melatonin. 5 milliGRAM(s) Oral at bedtime  QUEtiapine 125 milliGRAM(s) Oral daily  QUEtiapine 200 milliGRAM(s) Oral at bedtime    MEDICATIONS  (PRN):  acetaminophen     Tablet .. 650 milliGRAM(s) Oral every 6 hours PRN Moderate Pain (4 - 6)  aluminum hydroxide/magnesium hydroxide/simethicone Suspension 30 milliLiter(s) Oral every 6 hours PRN Dyspepsia  dextrose Oral Gel 15 Gram(s) Oral once PRN Blood Glucose LESS THAN 70 milliGRAM(s)/deciliter  diphenhydrAMINE 50 milliGRAM(s) Oral every 6 hours PRN agitation  haloperidol     Tablet 5 milliGRAM(s) Oral every 6 hours PRN agitation  magnesium hydroxide Suspension 30 milliLiter(s) Oral daily PRN Constipation  ondansetron Injectable 4 milliGRAM(s) IntraMuscular every 6 hours PRN nausea4

## 2023-12-07 NOTE — BH INPATIENT PSYCHIATRY PROGRESS NOTE - NSBHFUPINTERVALHXFT_PSY_A_CORE
No reports of behavioral concerns overnight. Pt no longer requires single room for disorganized behavior. Pt continues to show improved insight and judgement. Pt continues to repeat questions she has already asked to treatment team.  Pt much more polite and organized. Seroquel uptitration, encouraging brain imaging. Pt will be discharged out to shelter and be followed by ACT Team. I spoke with pt in [facility] for 25 min and documented for 10 min.  Pt much more polite and organized. Seroquel uptitration, encouraging brain imaging. Pt will be discharged out to shelter and be followed by ACT Team. I spoke with pt in [facility] for 25 min and documented for 10 min.

## 2023-12-07 NOTE — BH INPATIENT PSYCHIATRY PROGRESS NOTE - NSTXPSYCHOINTERMD_PSY_ALL_CORE
seroquel, speak with [facility] ACT Team seroquel, speak with [facility] [place] ACT Team

## 2023-12-07 NOTE — BH INPATIENT PSYCHIATRY PROGRESS NOTE - NSBHCHARTREVIEWVS_PSY_A_CORE FT
Vital Signs Last 24 Hrs  T(C): --  T(F): --  HR: 90 (12-07-23 @ 07:57) (90 - 90)  BP: 112/69 (12-07-23 @ 07:57) (112/69 - 112/69)  BP(mean): --  RR: --  SpO2: 96% (12-07-23 @ 07:57) (96% - 96%)     Vital Signs Last 24 Hrs  T(C): 37.1 (12-19-23 @ 08:25), Max: 37.2 (12-18-23 @ 16:30)  T(F): 98.8 (12-19-23 @ 08:25), Max: 98.9 (12-18-23 @ 16:30)  HR: 100 (12-19-23 @ 08:25) (93 - 100)  BP: 117/80 (12-19-23 @ 08:25) (117/80 - 131/75)  BP(mean): --  RR: --  SpO2: 99% (12-19-23 @ 08:25) (96% - 99%)

## 2023-12-07 NOTE — BH INPATIENT PSYCHIATRY PROGRESS NOTE - NSBHMETABOLIC_PSY_ALL_CORE_FT
BMI: BMI (kg/m2): 19.5 (11-20-23 @ 16:55)  HbA1c: A1C with Estimated Average Glucose Result: 13.4 % (11-20-23 @ 22:35)    Glucose: POCT Blood Glucose.: 267 mg/dL (12-07-23 @ 21:02)    BP: 112/69 (12-07-23 @ 07:57) (112/69 - 112/69)Vital Signs Last 24 Hrs  T(C): --  T(F): --  HR: 90 (12-07-23 @ 07:57) (90 - 90)  BP: 112/69 (12-07-23 @ 07:57) (112/69 - 112/69)  BP(mean): --  RR: --  SpO2: 96% (12-07-23 @ 07:57) (96% - 96%)      Lipid Panel:  BMI: BMI (kg/m2): 19.5 (11-20-23 @ 16:55)  HbA1c: A1C with Estimated Average Glucose Result: 13.4 % (11-20-23 @ 22:35)    Glucose: POCT Blood Glucose.: 103 mg/dL (12-19-23 @ 07:51)    BP: 117/80 (12-19-23 @ 08:25) (116/75 - 131/75)Vital Signs Last 24 Hrs  T(C): 37.1 (12-19-23 @ 08:25), Max: 37.2 (12-18-23 @ 16:30)  T(F): 98.8 (12-19-23 @ 08:25), Max: 98.9 (12-18-23 @ 16:30)  HR: 100 (12-19-23 @ 08:25) (93 - 100)  BP: 117/80 (12-19-23 @ 08:25) (117/80 - 131/75)  BP(mean): --  RR: --  SpO2: 99% (12-19-23 @ 08:25) (96% - 99%)      Lipid Panel:

## 2023-12-07 NOTE — BH INPATIENT PSYCHIATRY PROGRESS NOTE - NSBHATTESTBILLING_PSY_A_CORE
46154-Rdawjimgce OBS or IP - moderate complexity OR 35-49 mins 96726-Xwkwfifrsh OBS or IP - moderate complexity OR 35-49 mins

## 2023-12-07 NOTE — BH INPATIENT PSYCHIATRY PROGRESS NOTE - NSBHASSESSSUMMFT_PSY_ALL_CORE
This is a 53-y.o. HF patient, Libyan-speaking, with reported history of schizophrenia, diabetes is presenting due to possible psychiatric complaints/AMS. Patient was found wandering and is not cooperative, initially admitted for hyperglycemia and psych CL team at transferring facility reported the patient was endorsing suicidal ideation. Pt transferred for [facility] on 2PC due to concerns that patient was a danger to herself.    While on the unit, the patient has been minimally participatory with treatment team. On arrival 11/30/23, the pt spoke for a few minutes with admitting attending but today unwilling to participate. Pt has limited PPHx in chart, but reportedly has a hx of schizophrenia and taking Seroquel 200 mg nightly but unable to confirm. Collateral information from the patient's sister and PSYCKES will attempt to obtain, given that the patient does not appear to have the capacity to consent for obtaining collateral information. Currently the working diagnosis is decompensated schizophrenia in the context of medication non-adherence. Plan to rule out organic brain disease with CT head without contrast, but pt likely unable to tolerate. Labs and EKG prior to transfer were not concerning. Pt has taken her Seroquel while on the unit, but patient may benefit from alternative psychotropic medication - will need additional PPHx before adjusting.     12/2 update: pt adherent with Seroquel, refusing to engage with writer and exhibiting some bizarre behaviors     12/3: I spoke to pt in Libyan. In contrast to yesterday, when she was licking the wall and telling the moonlighter she was the devil, she was much friendlier to me and spent much more time talking to me. Gave consent for me to speak with sisters if I can find their numbers. Internally preoccupied and staring at the wall when I first came in but then engaged in conversation. Will increase seroquel to 75/200.    12/4: Pt pleasant, keeping to self, but politely asked to wait until tomorrow to talk    12/5: With pt's consent - Attempted to call patient's mental health shelter - [facility] (Stephie Nicole -  - [phone]), Director - [facility] [phone], [phone]. Pt has shown notable improvement in her affect, her behavior is more organized and able to have linear conversation. Pt has been taking her medications as prescribed. Will increase her AM Seroquel dose to 100 mg    12/6/: Pt continues to show improvement on the unit. She has been adherent to her medication regimen and has been display more organized behavior and have linear conversation. Pt does appear to be repeating questions she has already asked the treatment team. Will plan on further workup if patient is able to tolerate. (CT head without contrast vs MRI head)    Plan:  1. Continue inpatient psychiatric admission on [facility] legal status - Pt unable to care for self, putting her at risk to harm herself and reports of suicidal ideation  2. Continue Seroquel 200 mg nightly for psychosis and 100 mg daily for psychosis. Consider alternative antipsychotic medications pending additional PPHx from collateral  3. Haldol 5 mg Q6H PRN for agitation  4. Ativan 1 mg po Q6H PRN for agitation  5. Benadryl 50 mg po Q6H PRN for agitation  6. Melatonin 5 mg nightly for insomnia  7. Continue diabetic regimen as previously prescribed This is a 53-y.o. HF patient, Palau[place]-speaking, with reported history of schizophrenia, diabetes is presenting due to possible psychiatric complaints/AMS. Patient was found wandering and is not cooperative, initially admitted for hyperglycemia and psych CL team at transferring facility reported the patient was endorsing suicidal ideation. Pt transferred for [facility] on 2PC due to concerns that patient was a danger to herself.    While on the unit, the patient has been minimally participatory with treatment team. On arrival 11/30/23, the pt spoke for a few minutes with admitting attending but today unwilling to participate. Pt has limited PPHx in chart, but reportedly has a hx of schizophrenia and taking Seroquel 200 mg nightly but unable to confirm. Collateral information from the patient's sister and PSYCKES will attempt to obtain, given that the patient does not appear to have the capacity to consent for obtaining collateral information. Currently the working diagnosis is decompensated schizophrenia in the context of medication non-adherence. Plan to rule out organic brain disease with CT head without contrast, but pt likely unable to tolerate. Labs and EKG prior to transfer were not concerning. Pt has taken her Seroquel while on the unit, but patient may benefit from alternative psychotropic medication - will need additional PPHx before adjusting.     12/2 update: pt adherent with Seroquel, refusing to engage with writer and exhibiting some bizarre behaviors     12/3: I spoke to pt in Palau[place]. In contrast to yesterday, when she was licking the wall and telling the moonlighter she was the devil, she was much friendlier to me and spent much more time talking to me. Gave consent for me to speak with sisters if I can find their numbers. Internally preoccupied and staring at the wall when I first came in but then engaged in conversation. Will increase seroquel to 75/200.    12/4: Pt pleasant, keeping to self, but politely asked to wait until tomorrow to talk    12/5: With pt's consent - Attempted to call patient's mental health shelter - [facility] (Stephie Nicole -  - [phone]), Director - [facility] [phone], [phone]. Pt has shown notable improvement in her affect, her behavior is more organized and able to have linear conversation. Pt has been taking her medications as prescribed. Will increase her AM Seroquel dose to 100 mg    12/6/: Pt continues to show improvement on the unit. She has been adherent to her medication regimen and has been display more organized behavior and have linear conversation. Pt does appear to be repeating questions she has already asked the treatment team. Will plan on further workup if patient is able to tolerate. (CT head without contrast vs MRI head)    Plan:  1. Continue inpatient psychiatric admission on [facility] legal status - Pt unable to care for self, putting her at risk to harm herself and reports of suicidal ideation  2. Continue Seroquel 200 mg nightly for psychosis and 100 mg daily for psychosis. Consider alternative antipsychotic medications pending additional PPHx from collateral  3. Haldol 5 mg Q6H PRN for agitation  4. Ativan 1 mg po Q6H PRN for agitation  5. Benadryl 50 mg po Q6H PRN for agitation  6. Melatonin 5 mg nightly for insomnia  7. Continue diabetic regimen as previously prescribed

## 2023-12-08 LAB
GLUCOSE BLDC GLUCOMTR-MCNC: 126 MG/DL — HIGH (ref 70–99)
GLUCOSE BLDC GLUCOMTR-MCNC: 126 MG/DL — HIGH (ref 70–99)
GLUCOSE BLDC GLUCOMTR-MCNC: 199 MG/DL — HIGH (ref 70–99)
GLUCOSE BLDC GLUCOMTR-MCNC: 199 MG/DL — HIGH (ref 70–99)
GLUCOSE BLDC GLUCOMTR-MCNC: 219 MG/DL — HIGH (ref 70–99)
GLUCOSE BLDC GLUCOMTR-MCNC: 219 MG/DL — HIGH (ref 70–99)
GLUCOSE BLDC GLUCOMTR-MCNC: 251 MG/DL — HIGH (ref 70–99)
GLUCOSE BLDC GLUCOMTR-MCNC: 251 MG/DL — HIGH (ref 70–99)

## 2023-12-08 RX ADMIN — Medication 3: at 22:12

## 2023-12-08 RX ADMIN — Medication 2: at 12:19

## 2023-12-08 RX ADMIN — QUETIAPINE FUMARATE 125 MILLIGRAM(S): 200 TABLET, FILM COATED ORAL at 12:20

## 2023-12-08 RX ADMIN — INSULIN GLARGINE 18 UNIT(S): 100 INJECTION, SOLUTION SUBCUTANEOUS at 22:07

## 2023-12-08 RX ADMIN — Medication 650 MILLIGRAM(S): at 13:20

## 2023-12-08 RX ADMIN — Medication 10 UNIT(S): at 12:19

## 2023-12-08 RX ADMIN — Medication 6 UNIT(S): at 07:58

## 2023-12-08 RX ADMIN — Medication 5 MILLIGRAM(S): at 22:08

## 2023-12-08 RX ADMIN — QUETIAPINE FUMARATE 200 MILLIGRAM(S): 200 TABLET, FILM COATED ORAL at 22:08

## 2023-12-08 RX ADMIN — Medication 1: at 07:58

## 2023-12-08 RX ADMIN — Medication 650 MILLIGRAM(S): at 12:20

## 2023-12-08 RX ADMIN — Medication 50 MILLIGRAM(S): at 22:08

## 2023-12-09 LAB
GLUCOSE BLDC GLUCOMTR-MCNC: 118 MG/DL — HIGH (ref 70–99)
GLUCOSE BLDC GLUCOMTR-MCNC: 118 MG/DL — HIGH (ref 70–99)
GLUCOSE BLDC GLUCOMTR-MCNC: 181 MG/DL — HIGH (ref 70–99)
GLUCOSE BLDC GLUCOMTR-MCNC: 181 MG/DL — HIGH (ref 70–99)
GLUCOSE BLDC GLUCOMTR-MCNC: 190 MG/DL — HIGH (ref 70–99)
GLUCOSE BLDC GLUCOMTR-MCNC: 190 MG/DL — HIGH (ref 70–99)
GLUCOSE BLDC GLUCOMTR-MCNC: 238 MG/DL — HIGH (ref 70–99)
GLUCOSE BLDC GLUCOMTR-MCNC: 238 MG/DL — HIGH (ref 70–99)

## 2023-12-09 RX ADMIN — Medication 1: at 08:22

## 2023-12-09 RX ADMIN — Medication 1: at 21:22

## 2023-12-09 RX ADMIN — Medication 5 MILLIGRAM(S): at 21:22

## 2023-12-09 RX ADMIN — Medication 10 UNIT(S): at 17:12

## 2023-12-09 RX ADMIN — QUETIAPINE FUMARATE 125 MILLIGRAM(S): 200 TABLET, FILM COATED ORAL at 10:34

## 2023-12-09 RX ADMIN — Medication 650 MILLIGRAM(S): at 21:22

## 2023-12-09 RX ADMIN — Medication 2: at 12:22

## 2023-12-09 RX ADMIN — Medication 6 UNIT(S): at 08:21

## 2023-12-09 RX ADMIN — Medication 10 UNIT(S): at 12:23

## 2023-12-09 RX ADMIN — INSULIN GLARGINE 18 UNIT(S): 100 INJECTION, SOLUTION SUBCUTANEOUS at 21:23

## 2023-12-09 RX ADMIN — Medication 650 MILLIGRAM(S): at 22:22

## 2023-12-09 RX ADMIN — QUETIAPINE FUMARATE 200 MILLIGRAM(S): 200 TABLET, FILM COATED ORAL at 21:23

## 2023-12-10 LAB
GLUCOSE BLDC GLUCOMTR-MCNC: 100 MG/DL — HIGH (ref 70–99)
GLUCOSE BLDC GLUCOMTR-MCNC: 100 MG/DL — HIGH (ref 70–99)
GLUCOSE BLDC GLUCOMTR-MCNC: 170 MG/DL — HIGH (ref 70–99)
GLUCOSE BLDC GLUCOMTR-MCNC: 170 MG/DL — HIGH (ref 70–99)
GLUCOSE BLDC GLUCOMTR-MCNC: 217 MG/DL — HIGH (ref 70–99)
GLUCOSE BLDC GLUCOMTR-MCNC: 217 MG/DL — HIGH (ref 70–99)
GLUCOSE BLDC GLUCOMTR-MCNC: 362 MG/DL — HIGH (ref 70–99)
GLUCOSE BLDC GLUCOMTR-MCNC: 362 MG/DL — HIGH (ref 70–99)

## 2023-12-10 RX ADMIN — Medication 5 MILLIGRAM(S): at 21:22

## 2023-12-10 RX ADMIN — QUETIAPINE FUMARATE 200 MILLIGRAM(S): 200 TABLET, FILM COATED ORAL at 21:22

## 2023-12-10 RX ADMIN — Medication 5: at 07:55

## 2023-12-10 RX ADMIN — Medication 2: at 21:21

## 2023-12-10 RX ADMIN — Medication 1: at 11:58

## 2023-12-10 RX ADMIN — QUETIAPINE FUMARATE 125 MILLIGRAM(S): 200 TABLET, FILM COATED ORAL at 10:59

## 2023-12-10 RX ADMIN — Medication 10 UNIT(S): at 11:59

## 2023-12-10 RX ADMIN — Medication 6 UNIT(S): at 07:55

## 2023-12-10 RX ADMIN — INSULIN GLARGINE 18 UNIT(S): 100 INJECTION, SOLUTION SUBCUTANEOUS at 21:22

## 2023-12-11 LAB
GLUCOSE BLDC GLUCOMTR-MCNC: 178 MG/DL — HIGH (ref 70–99)
GLUCOSE BLDC GLUCOMTR-MCNC: 178 MG/DL — HIGH (ref 70–99)
GLUCOSE BLDC GLUCOMTR-MCNC: 200 MG/DL — HIGH (ref 70–99)
GLUCOSE BLDC GLUCOMTR-MCNC: 200 MG/DL — HIGH (ref 70–99)
GLUCOSE BLDC GLUCOMTR-MCNC: 228 MG/DL — HIGH (ref 70–99)
GLUCOSE BLDC GLUCOMTR-MCNC: 228 MG/DL — HIGH (ref 70–99)

## 2023-12-11 RX ADMIN — Medication 5 MILLIGRAM(S): at 21:53

## 2023-12-11 RX ADMIN — Medication 1: at 12:21

## 2023-12-11 RX ADMIN — Medication 2: at 21:54

## 2023-12-11 RX ADMIN — Medication 1: at 17:16

## 2023-12-11 RX ADMIN — QUETIAPINE FUMARATE 200 MILLIGRAM(S): 200 TABLET, FILM COATED ORAL at 21:54

## 2023-12-11 RX ADMIN — Medication 10 UNIT(S): at 12:21

## 2023-12-11 RX ADMIN — QUETIAPINE FUMARATE 125 MILLIGRAM(S): 200 TABLET, FILM COATED ORAL at 10:58

## 2023-12-11 RX ADMIN — INSULIN GLARGINE 18 UNIT(S): 100 INJECTION, SOLUTION SUBCUTANEOUS at 21:53

## 2023-12-11 RX ADMIN — Medication 10 UNIT(S): at 17:16

## 2023-12-12 LAB
ALBUMIN SERPL ELPH-MCNC: 3.8 G/DL — SIGNIFICANT CHANGE UP (ref 3.3–5)
ALBUMIN SERPL ELPH-MCNC: 3.8 G/DL — SIGNIFICANT CHANGE UP (ref 3.3–5)
ALP SERPL-CCNC: 140 U/L — HIGH (ref 40–120)
ALP SERPL-CCNC: 140 U/L — HIGH (ref 40–120)
ALT FLD-CCNC: 56 U/L — HIGH (ref 10–45)
ALT FLD-CCNC: 56 U/L — HIGH (ref 10–45)
ANION GAP SERPL CALC-SCNC: 11 MMOL/L — SIGNIFICANT CHANGE UP (ref 5–17)
ANION GAP SERPL CALC-SCNC: 11 MMOL/L — SIGNIFICANT CHANGE UP (ref 5–17)
AST SERPL-CCNC: 34 U/L — SIGNIFICANT CHANGE UP (ref 10–40)
AST SERPL-CCNC: 34 U/L — SIGNIFICANT CHANGE UP (ref 10–40)
BILIRUB SERPL-MCNC: 0.2 MG/DL — SIGNIFICANT CHANGE UP (ref 0.2–1.2)
BILIRUB SERPL-MCNC: 0.2 MG/DL — SIGNIFICANT CHANGE UP (ref 0.2–1.2)
BUN SERPL-MCNC: 20 MG/DL — SIGNIFICANT CHANGE UP (ref 7–23)
BUN SERPL-MCNC: 20 MG/DL — SIGNIFICANT CHANGE UP (ref 7–23)
CALCIUM SERPL-MCNC: 9.4 MG/DL — SIGNIFICANT CHANGE UP (ref 8.4–10.5)
CALCIUM SERPL-MCNC: 9.4 MG/DL — SIGNIFICANT CHANGE UP (ref 8.4–10.5)
CHLORIDE SERPL-SCNC: 103 MMOL/L — SIGNIFICANT CHANGE UP (ref 96–108)
CHLORIDE SERPL-SCNC: 103 MMOL/L — SIGNIFICANT CHANGE UP (ref 96–108)
CO2 SERPL-SCNC: 25 MMOL/L — SIGNIFICANT CHANGE UP (ref 22–31)
CO2 SERPL-SCNC: 25 MMOL/L — SIGNIFICANT CHANGE UP (ref 22–31)
CREAT SERPL-MCNC: 0.65 MG/DL — SIGNIFICANT CHANGE UP (ref 0.5–1.3)
CREAT SERPL-MCNC: 0.65 MG/DL — SIGNIFICANT CHANGE UP (ref 0.5–1.3)
EGFR: 107 ML/MIN/1.73M2 — SIGNIFICANT CHANGE UP
EGFR: 107 ML/MIN/1.73M2 — SIGNIFICANT CHANGE UP
GLUCOSE BLDC GLUCOMTR-MCNC: 150 MG/DL — HIGH (ref 70–99)
GLUCOSE BLDC GLUCOMTR-MCNC: 150 MG/DL — HIGH (ref 70–99)
GLUCOSE BLDC GLUCOMTR-MCNC: 157 MG/DL — HIGH (ref 70–99)
GLUCOSE BLDC GLUCOMTR-MCNC: 157 MG/DL — HIGH (ref 70–99)
GLUCOSE BLDC GLUCOMTR-MCNC: 261 MG/DL — HIGH (ref 70–99)
GLUCOSE BLDC GLUCOMTR-MCNC: 261 MG/DL — HIGH (ref 70–99)
GLUCOSE SERPL-MCNC: 173 MG/DL — HIGH (ref 70–99)
GLUCOSE SERPL-MCNC: 173 MG/DL — HIGH (ref 70–99)
HCT VFR BLD CALC: 34.1 % — LOW (ref 34.5–45)
HCT VFR BLD CALC: 34.1 % — LOW (ref 34.5–45)
HGB BLD-MCNC: 11.3 G/DL — LOW (ref 11.5–15.5)
HGB BLD-MCNC: 11.3 G/DL — LOW (ref 11.5–15.5)
MCHC RBC-ENTMCNC: 30.5 PG — SIGNIFICANT CHANGE UP (ref 27–34)
MCHC RBC-ENTMCNC: 30.5 PG — SIGNIFICANT CHANGE UP (ref 27–34)
MCHC RBC-ENTMCNC: 33.1 GM/DL — SIGNIFICANT CHANGE UP (ref 32–36)
MCHC RBC-ENTMCNC: 33.1 GM/DL — SIGNIFICANT CHANGE UP (ref 32–36)
MCV RBC AUTO: 92.2 FL — SIGNIFICANT CHANGE UP (ref 80–100)
MCV RBC AUTO: 92.2 FL — SIGNIFICANT CHANGE UP (ref 80–100)
NRBC # BLD: 0 /100 WBCS — SIGNIFICANT CHANGE UP (ref 0–0)
NRBC # BLD: 0 /100 WBCS — SIGNIFICANT CHANGE UP (ref 0–0)
PLATELET # BLD AUTO: 298 K/UL — SIGNIFICANT CHANGE UP (ref 150–400)
PLATELET # BLD AUTO: 298 K/UL — SIGNIFICANT CHANGE UP (ref 150–400)
POTASSIUM SERPL-MCNC: 4.3 MMOL/L — SIGNIFICANT CHANGE UP (ref 3.5–5.3)
POTASSIUM SERPL-MCNC: 4.3 MMOL/L — SIGNIFICANT CHANGE UP (ref 3.5–5.3)
POTASSIUM SERPL-SCNC: 4.3 MMOL/L — SIGNIFICANT CHANGE UP (ref 3.5–5.3)
POTASSIUM SERPL-SCNC: 4.3 MMOL/L — SIGNIFICANT CHANGE UP (ref 3.5–5.3)
PROT SERPL-MCNC: 7.4 G/DL — SIGNIFICANT CHANGE UP (ref 6–8.3)
PROT SERPL-MCNC: 7.4 G/DL — SIGNIFICANT CHANGE UP (ref 6–8.3)
RBC # BLD: 3.7 M/UL — LOW (ref 3.8–5.2)
RBC # BLD: 3.7 M/UL — LOW (ref 3.8–5.2)
RBC # FLD: 13.5 % — SIGNIFICANT CHANGE UP (ref 10.3–14.5)
RBC # FLD: 13.5 % — SIGNIFICANT CHANGE UP (ref 10.3–14.5)
SODIUM SERPL-SCNC: 139 MMOL/L — SIGNIFICANT CHANGE UP (ref 135–145)
SODIUM SERPL-SCNC: 139 MMOL/L — SIGNIFICANT CHANGE UP (ref 135–145)
WBC # BLD: 9.47 K/UL — SIGNIFICANT CHANGE UP (ref 3.8–10.5)
WBC # BLD: 9.47 K/UL — SIGNIFICANT CHANGE UP (ref 3.8–10.5)
WBC # FLD AUTO: 9.47 K/UL — SIGNIFICANT CHANGE UP (ref 3.8–10.5)
WBC # FLD AUTO: 9.47 K/UL — SIGNIFICANT CHANGE UP (ref 3.8–10.5)

## 2023-12-12 PROCEDURE — 99231 SBSQ HOSP IP/OBS SF/LOW 25: CPT

## 2023-12-12 RX ADMIN — QUETIAPINE FUMARATE 200 MILLIGRAM(S): 200 TABLET, FILM COATED ORAL at 22:40

## 2023-12-12 RX ADMIN — Medication 6 UNIT(S): at 08:05

## 2023-12-12 RX ADMIN — Medication 10 UNIT(S): at 17:11

## 2023-12-12 RX ADMIN — Medication 5 MILLIGRAM(S): at 22:41

## 2023-12-12 RX ADMIN — Medication 1: at 08:05

## 2023-12-12 RX ADMIN — QUETIAPINE FUMARATE 125 MILLIGRAM(S): 200 TABLET, FILM COATED ORAL at 10:16

## 2023-12-12 RX ADMIN — Medication 3: at 22:39

## 2023-12-12 RX ADMIN — INSULIN GLARGINE 18 UNIT(S): 100 INJECTION, SOLUTION SUBCUTANEOUS at 22:38

## 2023-12-12 NOTE — BH INPATIENT PSYCHIATRY PROGRESS NOTE - NSBHFUPINTERVALHXFT_PSY_A_CORE
No reports of behavioral concerns overnight. Pt no longer requires single room for disorganized behavior. Pt continues to show improved insight and judgement. Pt continues to repeat questions she has already asked to treatment team.  I spoke with pt in Mosotho. -SI/HI/AH/VH. +PI. Pt reports feeling much better on current regimen. I spoke with her for 15 min and docuemtstan for 10 min.    No reports of behavioral concerns overnight. Pt no longer requires single room for disorganized behavior. Pt continues to show improved insight and judgement. Pt continues to repeat questions she has already asked to treatment team.  I spoke with pt in Swazi. -SI/HI/AH/VH. +PI. Pt reports feeling much better on current regimen. I spoke with her for 15 min and docuemtstan for 10 min.    No reports of behavioral concerns overnight. Pt no longer requires single room for disorganized behavior. Pt continues to show improved insight and judgement. Pt continues to repeat questions she has already asked to treatment team.

## 2023-12-12 NOTE — BH INPATIENT PSYCHIATRY PROGRESS NOTE - NSTXPSYCHOINTERMD_PSY_ALL_CORE
seroquel, speak with [facility] ACT Team seroquel, speak with [facility] ACT Team

## 2023-12-12 NOTE — BH INPATIENT PSYCHIATRY PROGRESS NOTE - PRN MEDS
MEDICATIONS  (PRN):  acetaminophen     Tablet .. 650 milliGRAM(s) Oral every 6 hours PRN Moderate Pain (4 - 6)  aluminum hydroxide/magnesium hydroxide/simethicone Suspension 30 milliLiter(s) Oral every 6 hours PRN Dyspepsia  dextrose Oral Gel 15 Gram(s) Oral once PRN Blood Glucose LESS THAN 70 milliGRAM(s)/deciliter  diphenhydrAMINE 50 milliGRAM(s) Oral every 6 hours PRN agitation  haloperidol     Tablet 5 milliGRAM(s) Oral every 6 hours PRN agitation  magnesium hydroxide Suspension 30 milliLiter(s) Oral daily PRN Constipation  ondansetron Injectable 4 milliGRAM(s) IntraMuscular every 6 hours PRN nausea4

## 2023-12-12 NOTE — BH INPATIENT PSYCHIATRY PROGRESS NOTE - CURRENT MEDICATION
MEDICATIONS  (STANDING):  dextrose 5%. 1000 milliLiter(s) (50 mL/Hr) IV Continuous <Continuous>  dextrose 5%. 1000 milliLiter(s) (100 mL/Hr) IV Continuous <Continuous>  dextrose 50% Injectable 12.5 Gram(s) IV Push once  dextrose 50% Injectable 25 Gram(s) IV Push once  dextrose 50% Injectable 25 Gram(s) IV Push once  glucagon  Injectable 1 milliGRAM(s) IntraMuscular once  insulin glargine Injectable (LANTUS) 18 Unit(s) SubCutaneous at bedtime  insulin lispro (ADMELOG) corrective regimen sliding scale   SubCutaneous Before meals and at bedtime  insulin lispro Injectable (ADMELOG) 6 Unit(s) SubCutaneous before breakfast  insulin lispro Injectable (ADMELOG) 10 Unit(s) SubCutaneous before dinner  insulin lispro Injectable (ADMELOG) 10 Unit(s) SubCutaneous before lunch  melatonin. 5 milliGRAM(s) Oral at bedtime  QUEtiapine 125 milliGRAM(s) Oral daily  QUEtiapine 200 milliGRAM(s) Oral at bedtime    MEDICATIONS  (PRN):  acetaminophen     Tablet .. 650 milliGRAM(s) Oral every 6 hours PRN Moderate Pain (4 - 6)  aluminum hydroxide/magnesium hydroxide/simethicone Suspension 30 milliLiter(s) Oral every 6 hours PRN Dyspepsia  dextrose Oral Gel 15 Gram(s) Oral once PRN Blood Glucose LESS THAN 70 milliGRAM(s)/deciliter  diphenhydrAMINE 50 milliGRAM(s) Oral every 6 hours PRN agitation  haloperidol     Tablet 5 milliGRAM(s) Oral every 6 hours PRN agitation  magnesium hydroxide Suspension 30 milliLiter(s) Oral daily PRN Constipation  ondansetron Injectable 4 milliGRAM(s) IntraMuscular every 6 hours PRN nausea4   MEDICATIONS  (STANDING):  dextrose 5%. 1000 milliLiter(s) (50 mL/Hr) IV Continuous <Continuous>  dextrose 5%. 1000 milliLiter(s) (100 mL/Hr) IV Continuous <Continuous>  dextrose 50% Injectable 12.5 Gram(s) IV Push once  dextrose 50% Injectable 25 Gram(s) IV Push once  dextrose 50% Injectable 25 Gram(s) IV Push once  glucagon  Injectable 1 milliGRAM(s) IntraMuscular once  insulin glargine Injectable (LANTUS) 18 Unit(s) SubCutaneous at bedtime  insulin lispro (ADMELOG) corrective regimen sliding scale   SubCutaneous Before meals and at bedtime  insulin lispro Injectable (ADMELOG) 6 Unit(s) SubCutaneous before breakfast  insulin lispro Injectable (ADMELOG) 10 Unit(s) SubCutaneous before dinner  insulin lispro Injectable (ADMELOG) 10 Unit(s) SubCutaneous before lunch  melatonin. 5 milliGRAM(s) Oral at bedtime  QUEtiapine 200 milliGRAM(s) Oral at bedtime  QUEtiapine 125 milliGRAM(s) Oral daily    MEDICATIONS  (PRN):  acetaminophen     Tablet .. 650 milliGRAM(s) Oral every 6 hours PRN Moderate Pain (4 - 6)  aluminum hydroxide/magnesium hydroxide/simethicone Suspension 30 milliLiter(s) Oral every 6 hours PRN Dyspepsia  dextrose Oral Gel 15 Gram(s) Oral once PRN Blood Glucose LESS THAN 70 milliGRAM(s)/deciliter  diphenhydrAMINE 50 milliGRAM(s) Oral every 6 hours PRN agitation  haloperidol     Tablet 5 milliGRAM(s) Oral every 6 hours PRN agitation  magnesium hydroxide Suspension 30 milliLiter(s) Oral daily PRN Constipation  ondansetron Injectable 4 milliGRAM(s) IntraMuscular every 6 hours PRN nausea4

## 2023-12-12 NOTE — BH INPATIENT PSYCHIATRY PROGRESS NOTE - NSBHATTESTBILLING_PSY_A_CORE
27305-Gmsmvkzgrb OBS or IP - low complexity OR 25-34 mins 10244-Fnlnumhogb OBS or IP - low complexity OR 25-34 mins

## 2023-12-12 NOTE — BH INPATIENT PSYCHIATRY PROGRESS NOTE - NSBHMETABOLIC_PSY_ALL_CORE_FT
BMI: BMI (kg/m2): 19.5 (11-20-23 @ 16:55)  HbA1c: A1C with Estimated Average Glucose Result: 13.4 % (11-20-23 @ 22:35)    Glucose: POCT Blood Glucose.: 157 mg/dL (12-12-23 @ 07:49)    BP: 115/69 (12-12-23 @ 08:35) (106/71 - 135/84)Vital Signs Last 24 Hrs  T(C): 36.9 (12-12-23 @ 08:35), Max: 36.9 (12-11-23 @ 16:49)  T(F): 98.5 (12-12-23 @ 08:35), Max: 98.5 (12-11-23 @ 16:49)  HR: 91 (12-12-23 @ 08:35) (91 - 96)  BP: 115/69 (12-12-23 @ 08:35) (115/69 - 117/45)  BP(mean): --  RR: 18 (12-12-23 @ 08:35) (18 - 18)  SpO2: 99% (12-12-23 @ 08:35) (99% - 99%)      Lipid Panel:  BMI: BMI (kg/m2): 19.5 (11-20-23 @ 16:55)  HbA1c: A1C with Estimated Average Glucose Result: 13.4 % (11-20-23 @ 22:35)    Glucose: POCT Blood Glucose.: 103 mg/dL (12-19-23 @ 07:51)    BP: 117/80 (12-19-23 @ 08:25) (116/75 - 131/75)Vital Signs Last 24 Hrs  T(C): 37.1 (12-19-23 @ 08:25), Max: 37.2 (12-18-23 @ 16:30)  T(F): 98.8 (12-19-23 @ 08:25), Max: 98.9 (12-18-23 @ 16:30)  HR: 100 (12-19-23 @ 08:25) (93 - 100)  BP: 117/80 (12-19-23 @ 08:25) (117/80 - 131/75)  BP(mean): --  RR: --  SpO2: 99% (12-19-23 @ 08:25) (96% - 99%)      Lipid Panel:

## 2023-12-12 NOTE — BH INPATIENT PSYCHIATRY PROGRESS NOTE - NSBHCHARTREVIEWVS_PSY_A_CORE FT
Vital Signs Last 24 Hrs  T(C): 36.9 (12-12-23 @ 08:35), Max: 36.9 (12-11-23 @ 16:49)  T(F): 98.5 (12-12-23 @ 08:35), Max: 98.5 (12-11-23 @ 16:49)  HR: 91 (12-12-23 @ 08:35) (91 - 96)  BP: 115/69 (12-12-23 @ 08:35) (115/69 - 117/45)  BP(mean): --  RR: 18 (12-12-23 @ 08:35) (18 - 18)  SpO2: 99% (12-12-23 @ 08:35) (99% - 99%)     Vital Signs Last 24 Hrs  T(C): 37.1 (12-19-23 @ 08:25), Max: 37.2 (12-18-23 @ 16:30)  T(F): 98.8 (12-19-23 @ 08:25), Max: 98.9 (12-18-23 @ 16:30)  HR: 100 (12-19-23 @ 08:25) (93 - 100)  BP: 117/80 (12-19-23 @ 08:25) (117/80 - 131/75)  BP(mean): --  RR: --  SpO2: 99% (12-19-23 @ 08:25) (96% - 99%)

## 2023-12-12 NOTE — BH INPATIENT PSYCHIATRY PROGRESS NOTE - NSBHASSESSSUMMFT_PSY_ALL_CORE
This is a 53-y.o. HF patient, Hungarian-speaking, with reported history of schizophrenia, diabetes is presenting due to possible psychiatric complaints/AMS. Patient was found wandering and is not cooperative, initially admitted for hyperglycemia and psych CL team at transferring facility reported the patient was endorsing suicidal ideation. Pt transferred for [facility] on 2PC due to concerns that patient was a danger to herself.    While on the unit, the patient has been minimally participatory with treatment team. On arrival 11/30/23, the pt spoke for a few minutes with admitting attending but today unwilling to participate. Pt has limited PPHx in chart, but reportedly has a hx of schizophrenia and taking Seroquel 200 mg nightly but unable to confirm. Collateral information from the patient's sister and PSYCKES will attempt to obtain, given that the patient does not appear to have the capacity to consent for obtaining collateral information. Currently the working diagnosis is decompensated schizophrenia in the context of medication non-adherence. Plan to rule out organic brain disease with CT head without contrast, but pt likely unable to tolerate. Labs and EKG prior to transfer were not concerning. Pt has taken her Seroquel while on the unit, but patient may benefit from alternative psychotropic medication - will need additional PPHx before adjusting.     12/2 update: pt adherent with Seroquel, refusing to engage with writer and exhibiting some bizarre behaviors     12/3: I spoke to pt in Hungarian. In contrast to yesterday, when she was licking the wall and telling the moonlighter she was the devil, she was much friendlier to me and spent much more time talking to me. Gave consent for me to speak with sisters if I can find their numbers. Internally preoccupied and staring at the wall when I first came in but then engaged in conversation. Will increase seroquel to 75/200.    12/4: Pt pleasant, keeping to self, but politely asked to wait until tomorrow to talk    12/5: With pt's consent - Attempted to call patient's mental health shelter - [facility] (Stephie Nicole -  - [phone]), Director - [facility] [phone], [phone]. Pt has shown notable improvement in her affect, her behavior is more organized and able to have linear conversation. Pt has been taking her medications as prescribed. Will increase her AM Seroquel dose to 100 mg    12/6/: Pt continues to show improvement on the unit. She has been adherent to her medication regimen and has been display more organized behavior and have linear conversation. Pt does appear to be repeating questions she has already asked the treatment team. Will plan on further workup if patient is able to tolerate. (CT head without contrast vs MRI head)    Plan:  1. Continue inpatient psychiatric admission on [facility] legal status - Pt unable to care for self, putting her at risk to harm herself and reports of suicidal ideation  2. Continue Seroquel 200 mg nightly for psychosis and 100 mg daily for psychosis. Consider alternative antipsychotic medications pending additional PPHx from collateral  3. Haldol 5 mg Q6H PRN for agitation  4. Ativan 1 mg po Q6H PRN for agitation  5. Benadryl 50 mg po Q6H PRN for agitation  6. Melatonin 5 mg nightly for insomnia  7. Continue diabetic regimen as previously prescribed This is a 53-y.o. HF patient, Ghanaian-speaking, with reported history of schizophrenia, diabetes is presenting due to possible psychiatric complaints/AMS. Patient was found wandering and is not cooperative, initially admitted for hyperglycemia and psych CL team at transferring facility reported the patient was endorsing suicidal ideation. Pt transferred for [facility] on 2PC due to concerns that patient was a danger to herself.    While on the unit, the patient has been minimally participatory with treatment team. On arrival 11/30/23, the pt spoke for a few minutes with admitting attending but today unwilling to participate. Pt has limited PPHx in chart, but reportedly has a hx of schizophrenia and taking Seroquel 200 mg nightly but unable to confirm. Collateral information from the patient's sister and PSYCKES will attempt to obtain, given that the patient does not appear to have the capacity to consent for obtaining collateral information. Currently the working diagnosis is decompensated schizophrenia in the context of medication non-adherence. Plan to rule out organic brain disease with CT head without contrast, but pt likely unable to tolerate. Labs and EKG prior to transfer were not concerning. Pt has taken her Seroquel while on the unit, but patient may benefit from alternative psychotropic medication - will need additional PPHx before adjusting.     12/2 update: pt adherent with Seroquel, refusing to engage with writer and exhibiting some bizarre behaviors     12/3: I spoke to pt in Ghanaian. In contrast to yesterday, when she was licking the wall and telling the moonlighter she was the devil, she was much friendlier to me and spent much more time talking to me. Gave consent for me to speak with sisters if I can find their numbers. Internally preoccupied and staring at the wall when I first came in but then engaged in conversation. Will increase seroquel to 75/200.    12/4: Pt pleasant, keeping to self, but politely asked to wait until tomorrow to talk    12/5: With pt's consent - Attempted to call patient's mental health shelter - [facility] (Stephie Nicole -  - [phone]), Director - [facility] [phone], [phone]. Pt has shown notable improvement in her affect, her behavior is more organized and able to have linear conversation. Pt has been taking her medications as prescribed. Will increase her AM Seroquel dose to 100 mg    12/6/: Pt continues to show improvement on the unit. She has been adherent to her medication regimen and has been display more organized behavior and have linear conversation. Pt does appear to be repeating questions she has already asked the treatment team. Will plan on further workup if patient is able to tolerate. (CT head without contrast vs MRI head)    Plan:  1. Continue inpatient psychiatric admission on [facility] legal status - Pt unable to care for self, putting her at risk to harm herself and reports of suicidal ideation  2. Continue Seroquel 200 mg nightly for psychosis and 100 mg daily for psychosis. Consider alternative antipsychotic medications pending additional PPHx from collateral  3. Haldol 5 mg Q6H PRN for agitation  4. Ativan 1 mg po Q6H PRN for agitation  5. Benadryl 50 mg po Q6H PRN for agitation  6. Melatonin 5 mg nightly for insomnia  7. Continue diabetic regimen as previously prescribed

## 2023-12-13 LAB
GLUCOSE BLDC GLUCOMTR-MCNC: 117 MG/DL — HIGH (ref 70–99)
GLUCOSE BLDC GLUCOMTR-MCNC: 155 MG/DL — HIGH (ref 70–99)
GLUCOSE BLDC GLUCOMTR-MCNC: 155 MG/DL — HIGH (ref 70–99)
GLUCOSE BLDC GLUCOMTR-MCNC: 173 MG/DL — HIGH (ref 70–99)
GLUCOSE BLDC GLUCOMTR-MCNC: 173 MG/DL — HIGH (ref 70–99)
GLUCOSE BLDC GLUCOMTR-MCNC: 174 MG/DL — HIGH (ref 70–99)
GLUCOSE BLDC GLUCOMTR-MCNC: 174 MG/DL — HIGH (ref 70–99)

## 2023-12-13 RX ADMIN — INSULIN GLARGINE 18 UNIT(S): 100 INJECTION, SOLUTION SUBCUTANEOUS at 21:31

## 2023-12-13 RX ADMIN — Medication 10 UNIT(S): at 12:30

## 2023-12-13 RX ADMIN — Medication 650 MILLIGRAM(S): at 13:35

## 2023-12-13 RX ADMIN — Medication 650 MILLIGRAM(S): at 12:35

## 2023-12-13 RX ADMIN — QUETIAPINE FUMARATE 200 MILLIGRAM(S): 200 TABLET, FILM COATED ORAL at 21:32

## 2023-12-13 RX ADMIN — Medication 6 UNIT(S): at 08:09

## 2023-12-13 RX ADMIN — Medication 10 UNIT(S): at 16:45

## 2023-12-13 RX ADMIN — Medication 1: at 21:31

## 2023-12-13 RX ADMIN — Medication 1: at 12:30

## 2023-12-13 RX ADMIN — QUETIAPINE FUMARATE 125 MILLIGRAM(S): 200 TABLET, FILM COATED ORAL at 10:28

## 2023-12-13 RX ADMIN — Medication 650 MILLIGRAM(S): at 22:41

## 2023-12-13 RX ADMIN — Medication 5 MILLIGRAM(S): at 21:32

## 2023-12-13 RX ADMIN — Medication 1: at 08:09

## 2023-12-13 RX ADMIN — Medication 650 MILLIGRAM(S): at 23:41

## 2023-12-14 LAB
GLUCOSE BLDC GLUCOMTR-MCNC: 154 MG/DL — HIGH (ref 70–99)
GLUCOSE BLDC GLUCOMTR-MCNC: 154 MG/DL — HIGH (ref 70–99)
GLUCOSE BLDC GLUCOMTR-MCNC: 162 MG/DL — HIGH (ref 70–99)
GLUCOSE BLDC GLUCOMTR-MCNC: 162 MG/DL — HIGH (ref 70–99)
GLUCOSE BLDC GLUCOMTR-MCNC: 167 MG/DL — HIGH (ref 70–99)
GLUCOSE BLDC GLUCOMTR-MCNC: 167 MG/DL — HIGH (ref 70–99)
GLUCOSE BLDC GLUCOMTR-MCNC: 183 MG/DL — HIGH (ref 70–99)
GLUCOSE BLDC GLUCOMTR-MCNC: 183 MG/DL — HIGH (ref 70–99)
GLUCOSE BLDC GLUCOMTR-MCNC: 93 MG/DL — SIGNIFICANT CHANGE UP (ref 70–99)
GLUCOSE BLDC GLUCOMTR-MCNC: 93 MG/DL — SIGNIFICANT CHANGE UP (ref 70–99)

## 2023-12-14 PROCEDURE — 99231 SBSQ HOSP IP/OBS SF/LOW 25: CPT

## 2023-12-14 RX ADMIN — Medication 5 MILLIGRAM(S): at 21:26

## 2023-12-14 RX ADMIN — Medication 1: at 12:17

## 2023-12-14 RX ADMIN — Medication 1: at 17:49

## 2023-12-14 RX ADMIN — Medication 650 MILLIGRAM(S): at 17:51

## 2023-12-14 RX ADMIN — INSULIN GLARGINE 18 UNIT(S): 100 INJECTION, SOLUTION SUBCUTANEOUS at 21:29

## 2023-12-14 RX ADMIN — Medication 10 UNIT(S): at 17:48

## 2023-12-14 RX ADMIN — QUETIAPINE FUMARATE 200 MILLIGRAM(S): 200 TABLET, FILM COATED ORAL at 21:26

## 2023-12-14 RX ADMIN — Medication 10 UNIT(S): at 12:16

## 2023-12-14 RX ADMIN — QUETIAPINE FUMARATE 125 MILLIGRAM(S): 200 TABLET, FILM COATED ORAL at 10:29

## 2023-12-14 RX ADMIN — Medication 1: at 21:29

## 2023-12-14 RX ADMIN — Medication 1: at 07:50

## 2023-12-14 RX ADMIN — Medication 6 UNIT(S): at 07:50

## 2023-12-14 NOTE — BH TREATMENT PLAN - NSTXPSYCHOGOALOTHER_PSY_ALL_CORE
Pt. will participate in groups and milieu tx. structure of unit
Pt will participate in groups and milieu treatment structure of the unit
Pt will participate in groups and milieu treatment structure of the unit
Split-Thickness Skin Graft Text: The defect edges were debeveled with a #15 scalpel blade.  Given the location of the defect, shape of the defect and the proximity to free margins a split thickness skin graft was deemed most appropriate.  Using a sterile surgical marker, the primary defect shape was transferred to the donor site. The split thickness graft was then harvested.  The skin graft was then placed in the primary defect and oriented appropriately.

## 2023-12-14 NOTE — BH TREATMENT PLAN - NSTXPSYCHOINTERPR_PSY_ALL_CORE
Pt. will be invited and encouraged to join all offered groups
Pt will continue to be invited and encouraged to attend all offered groups
Pt will continue to be invited and encouraged to attend all offered groups

## 2023-12-14 NOTE — BH TREATMENT PLAN - NSCMSPTSTRENGTHS_PSY_ALL_CORE
Assertive/Resourceful/Supportive family

## 2023-12-14 NOTE — BH INPATIENT PSYCHIATRY PROGRESS NOTE - NSTXPSYCHOINTERMD_PSY_ALL_CORE
seroquel, speak with [facility] ACT Team seroquel, speak with [facility] ACT Team
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
## 2023-12-14 NOTE — BH TREATMENT PLAN - NSTXPSYCHOINTERMD_PSY_ALL_CORE
seroquel, speak with [facility] [place] ACT Team seroquel, speak with [facility] ACT Team

## 2023-12-14 NOTE — BH INPATIENT PSYCHIATRY PROGRESS NOTE - NSBHASSESSSUMMFT_PSY_ALL_CORE
This is a 53-y.o. HF patient, Libyan-speaking, with reported history of schizophrenia, diabetes is presenting due to possible psychiatric complaints/AMS. Patient was found wandering and is not cooperative, initially admitted for hyperglycemia and psych CL team at transferring facility reported the patient was endorsing suicidal ideation. Pt transferred for [facility] on 2PC due to concerns that patient was a danger to herself.    While on the unit, the patient has been minimally participatory with treatment team. On arrival 11/30/23, the pt spoke for a few minutes with admitting attending but today unwilling to participate. Pt has limited PPHx in chart, but reportedly has a hx of schizophrenia and taking Seroquel 200 mg nightly but unable to confirm. Collateral information from the patient's sister and PSYCKES will attempt to obtain, given that the patient does not appear to have the capacity to consent for obtaining collateral information. Currently the working diagnosis is decompensated schizophrenia in the context of medication non-adherence. Plan to rule out organic brain disease with CT head without contrast, but pt likely unable to tolerate. Labs and EKG prior to transfer were not concerning. Pt has taken her Seroquel while on the unit, but patient may benefit from alternative psychotropic medication - will need additional PPHx before adjusting.     12/2 update: pt adherent with Seroquel, refusing to engage with writer and exhibiting some bizarre behaviors     12/3: I spoke to pt in Libyan. In contrast to yesterday, when she was licking the wall and telling the moonlighter she was the devil, she was much friendlier to me and spent much more time talking to me. Gave consent for me to speak with sisters if I can find their numbers. Internally preoccupied and staring at the wall when I first came in but then engaged in conversation. Will increase seroquel to 75/200.    12/4: Pt pleasant, keeping to self, but politely asked to wait until tomorrow to talk    12/5: With pt's consent - Attempted to call patient's mental health shelter - [facility] (Stephie Nicole -  - [phone]), Director - [facility] [phone], [phone]. Pt has shown notable improvement in her affect, her behavior is more organized and able to have linear conversation. Pt has been taking her medications as prescribed. Will increase her AM Seroquel dose to 100 mg    12/6/: Pt continues to show improvement on the unit. She has been adherent to her medication regimen and has been display more organized behavior and have linear conversation. Pt does appear to be repeating questions she has already asked the treatment team. Will plan on further workup if patient is able to tolerate. (CT head without contrast vs MRI head)    Plan:  1. Continue inpatient psychiatric admission on [facility] legal status - Pt unable to care for self, putting her at risk to harm herself and reports of suicidal ideation  2. Continue Seroquel 200 mg nightly for psychosis and 100 mg daily for psychosis. Consider alternative antipsychotic medications pending additional PPHx from collateral  3. Haldol 5 mg Q6H PRN for agitation  4. Ativan 1 mg po Q6H PRN for agitation  5. Benadryl 50 mg po Q6H PRN for agitation  6. Melatonin 5 mg nightly for insomnia  7. Continue diabetic regimen as previously prescribed This is a 53-y.o. HF patient, Cypriot-speaking, with reported history of schizophrenia, diabetes is presenting due to possible psychiatric complaints/AMS. Patient was found wandering and is not cooperative, initially admitted for hyperglycemia and psych CL team at transferring facility reported the patient was endorsing suicidal ideation. Pt transferred for [facility] on 2PC due to concerns that patient was a danger to herself.    While on the unit, the patient has been minimally participatory with treatment team. On arrival 11/30/23, the pt spoke for a few minutes with admitting attending but today unwilling to participate. Pt has limited PPHx in chart, but reportedly has a hx of schizophrenia and taking Seroquel 200 mg nightly but unable to confirm. Collateral information from the patient's sister and PSYCKES will attempt to obtain, given that the patient does not appear to have the capacity to consent for obtaining collateral information. Currently the working diagnosis is decompensated schizophrenia in the context of medication non-adherence. Plan to rule out organic brain disease with CT head without contrast, but pt likely unable to tolerate. Labs and EKG prior to transfer were not concerning. Pt has taken her Seroquel while on the unit, but patient may benefit from alternative psychotropic medication - will need additional PPHx before adjusting.     12/2 update: pt adherent with Seroquel, refusing to engage with writer and exhibiting some bizarre behaviors     12/3: I spoke to pt in Cypriot. In contrast to yesterday, when she was licking the wall and telling the moonlighter she was the devil, she was much friendlier to me and spent much more time talking to me. Gave consent for me to speak with sisters if I can find their numbers. Internally preoccupied and staring at the wall when I first came in but then engaged in conversation. Will increase seroquel to 75/200.    12/4: Pt pleasant, keeping to self, but politely asked to wait until tomorrow to talk    12/5: With pt's consent - Attempted to call patient's mental health shelter - [facility] (Stephei Nicole -  - [phone]), Director - [facility] [phone], [phone]. Pt has shown notable improvement in her affect, her behavior is more organized and able to have linear conversation. Pt has been taking her medications as prescribed. Will increase her AM Seroquel dose to 100 mg    12/6/: Pt continues to show improvement on the unit. She has been adherent to her medication regimen and has been display more organized behavior and have linear conversation. Pt does appear to be repeating questions she has already asked the treatment team. Will plan on further workup if patient is able to tolerate. (CT head without contrast vs MRI head)    Plan:  1. Continue inpatient psychiatric admission on [facility] legal status - Pt unable to care for self, putting her at risk to harm herself and reports of suicidal ideation  2. Continue Seroquel 200 mg nightly for psychosis and 100 mg daily for psychosis. Consider alternative antipsychotic medications pending additional PPHx from collateral  3. Haldol 5 mg Q6H PRN for agitation  4. Ativan 1 mg po Q6H PRN for agitation  5. Benadryl 50 mg po Q6H PRN for agitation  6. Melatonin 5 mg nightly for insomnia  7. Continue diabetic regimen as previously prescribed

## 2023-12-14 NOTE — BH TREATMENT PLAN - NSTXPATIENTPARTICIPATE_PSY_ALL_CORE
Patient participated in identification of needs/problems/goals for treatment/No, patient unwilling to participate

## 2023-12-14 NOTE — BH INPATIENT PSYCHIATRY PROGRESS NOTE - NSBHCHARTREVIEWVS_PSY_A_CORE FT
Vital Signs Last 24 Hrs  T(C): 37.5 (12-14-23 @ 16:29), Max: 37.5 (12-14-23 @ 16:29)  T(F): 99.5 (12-14-23 @ 16:29), Max: 99.5 (12-14-23 @ 16:29)  HR: 96 (12-14-23 @ 16:29) (96 - 96)  BP: 117/77 (12-14-23 @ 16:29) (117/77 - 117/77)  BP(mean): --  RR: 16 (12-14-23 @ 16:29) (16 - 16)  SpO2: 98% (12-14-23 @ 16:29) (98% - 98%)     Vital Signs Last 24 Hrs  T(C): 37.1 (12-19-23 @ 08:25), Max: 37.2 (12-18-23 @ 16:30)  T(F): 98.8 (12-19-23 @ 08:25), Max: 98.9 (12-18-23 @ 16:30)  HR: 100 (12-19-23 @ 08:25) (93 - 100)  BP: 117/80 (12-19-23 @ 08:25) (117/80 - 131/75)  BP(mean): --  RR: --  SpO2: 99% (12-19-23 @ 08:25) (96% - 99%)

## 2023-12-14 NOTE — BH INPATIENT PSYCHIATRY PROGRESS NOTE - CURRENT MEDICATION
MEDICATIONS  (STANDING):  dextrose 5%. 1000 milliLiter(s) (50 mL/Hr) IV Continuous <Continuous>  dextrose 5%. 1000 milliLiter(s) (100 mL/Hr) IV Continuous <Continuous>  dextrose 50% Injectable 25 Gram(s) IV Push once  dextrose 50% Injectable 12.5 Gram(s) IV Push once  dextrose 50% Injectable 25 Gram(s) IV Push once  glucagon  Injectable 1 milliGRAM(s) IntraMuscular once  insulin glargine Injectable (LANTUS) 18 Unit(s) SubCutaneous at bedtime  insulin lispro (ADMELOG) corrective regimen sliding scale   SubCutaneous Before meals and at bedtime  insulin lispro Injectable (ADMELOG) 6 Unit(s) SubCutaneous before breakfast  insulin lispro Injectable (ADMELOG) 10 Unit(s) SubCutaneous before dinner  insulin lispro Injectable (ADMELOG) 10 Unit(s) SubCutaneous before lunch  melatonin. 5 milliGRAM(s) Oral at bedtime  QUEtiapine 125 milliGRAM(s) Oral daily  QUEtiapine 200 milliGRAM(s) Oral at bedtime    MEDICATIONS  (PRN):  acetaminophen     Tablet .. 650 milliGRAM(s) Oral every 6 hours PRN Moderate Pain (4 - 6)  aluminum hydroxide/magnesium hydroxide/simethicone Suspension 30 milliLiter(s) Oral every 6 hours PRN Dyspepsia  dextrose Oral Gel 15 Gram(s) Oral once PRN Blood Glucose LESS THAN 70 milliGRAM(s)/deciliter  diphenhydrAMINE 50 milliGRAM(s) Oral every 6 hours PRN agitation  haloperidol     Tablet 5 milliGRAM(s) Oral every 6 hours PRN agitation  magnesium hydroxide Suspension 30 milliLiter(s) Oral daily PRN Constipation  ondansetron Injectable 4 milliGRAM(s) IntraMuscular every 6 hours PRN nausea4

## 2023-12-14 NOTE — BH TREATMENT PLAN - NSTXPSYCHOINTERRN_PSY_ALL_CORE
Encourage patient to adhere with prescribed medications and treatment. Encourage patient to verbalize feelings and concerns.
Encourage patient to adhere with prescribed medications and treatment. Encourage patient to verbalize feelings and concerns.
(2) Answers neither question correctly
Encourage patient to adhere with prescribed medications and treatment. Encourage patient to verbalize feelings and concerns.

## 2023-12-14 NOTE — BH INPATIENT PSYCHIATRY PROGRESS NOTE - NSBHMETABOLIC_PSY_ALL_CORE_FT
BMI: BMI (kg/m2): 19.5 (11-20-23 @ 16:55)  HbA1c: A1C with Estimated Average Glucose Result: 13.4 % (11-20-23 @ 22:35)    Glucose: POCT Blood Glucose.: 154 mg/dL (12-14-23 @ 21:24)    BP: 117/77 (12-14-23 @ 16:29) (115/69 - 148/87)Vital Signs Last 24 Hrs  T(C): 37.5 (12-14-23 @ 16:29), Max: 37.5 (12-14-23 @ 16:29)  T(F): 99.5 (12-14-23 @ 16:29), Max: 99.5 (12-14-23 @ 16:29)  HR: 96 (12-14-23 @ 16:29) (96 - 96)  BP: 117/77 (12-14-23 @ 16:29) (117/77 - 117/77)  BP(mean): --  RR: 16 (12-14-23 @ 16:29) (16 - 16)  SpO2: 98% (12-14-23 @ 16:29) (98% - 98%)      Lipid Panel:  BMI: BMI (kg/m2): 19.5 (11-20-23 @ 16:55)  HbA1c: A1C with Estimated Average Glucose Result: 13.4 % (11-20-23 @ 22:35)    Glucose: POCT Blood Glucose.: 103 mg/dL (12-19-23 @ 07:51)    BP: 117/80 (12-19-23 @ 08:25) (116/75 - 131/75)Vital Signs Last 24 Hrs  T(C): 37.1 (12-19-23 @ 08:25), Max: 37.2 (12-18-23 @ 16:30)  T(F): 98.8 (12-19-23 @ 08:25), Max: 98.9 (12-18-23 @ 16:30)  HR: 100 (12-19-23 @ 08:25) (93 - 100)  BP: 117/80 (12-19-23 @ 08:25) (117/80 - 131/75)  BP(mean): --  RR: --  SpO2: 99% (12-19-23 @ 08:25) (96% - 99%)      Lipid Panel:

## 2023-12-14 NOTE — BH TREATMENT PLAN - NSTXPLANTHERAPYSESSIONSFT_PSY_ALL_CORE
12-12-23  Type of therapy: Creative arts therapy  --  Level of patient participation: Resistance to participation  Duration of participation: Less than 15 minutes  Therapy conducted by: Psych rehab  Therapy Summary: Pt. continues to decline (non-verbally) to invitations to groups of all therapeutic modalities; it is not evident pt is interactive with roommate or the milieu in any capacity  
  12-05-23  Type of therapy: Creative arts therapy  Type of session: Group  Level of patient participation: Not engaged  --  Therapy conducted by: Psych rehab  Therapy Summary: Pt has been declining groups consistently.  Pt has been mostly isolative, not very visible on the unit and not social with peers.  Continued encouragement to attend groups will be provided.

## 2023-12-15 LAB
GLUCOSE BLDC GLUCOMTR-MCNC: 114 MG/DL — HIGH (ref 70–99)
GLUCOSE BLDC GLUCOMTR-MCNC: 114 MG/DL — HIGH (ref 70–99)
GLUCOSE BLDC GLUCOMTR-MCNC: 141 MG/DL — HIGH (ref 70–99)
GLUCOSE BLDC GLUCOMTR-MCNC: 141 MG/DL — HIGH (ref 70–99)
GLUCOSE BLDC GLUCOMTR-MCNC: 153 MG/DL — HIGH (ref 70–99)
GLUCOSE BLDC GLUCOMTR-MCNC: 153 MG/DL — HIGH (ref 70–99)
GLUCOSE BLDC GLUCOMTR-MCNC: 237 MG/DL — HIGH (ref 70–99)
GLUCOSE BLDC GLUCOMTR-MCNC: 237 MG/DL — HIGH (ref 70–99)

## 2023-12-15 PROCEDURE — 99231 SBSQ HOSP IP/OBS SF/LOW 25: CPT

## 2023-12-15 RX ADMIN — Medication 10 UNIT(S): at 16:32

## 2023-12-15 RX ADMIN — Medication 5 MILLIGRAM(S): at 21:57

## 2023-12-15 RX ADMIN — Medication 10 UNIT(S): at 11:46

## 2023-12-15 RX ADMIN — Medication 1: at 07:56

## 2023-12-15 RX ADMIN — Medication 2: at 16:31

## 2023-12-15 RX ADMIN — QUETIAPINE FUMARATE 200 MILLIGRAM(S): 200 TABLET, FILM COATED ORAL at 21:57

## 2023-12-15 RX ADMIN — INSULIN GLARGINE 18 UNIT(S): 100 INJECTION, SOLUTION SUBCUTANEOUS at 21:58

## 2023-12-15 RX ADMIN — Medication 6 UNIT(S): at 07:56

## 2023-12-15 RX ADMIN — QUETIAPINE FUMARATE 125 MILLIGRAM(S): 200 TABLET, FILM COATED ORAL at 10:05

## 2023-12-15 NOTE — BH INPATIENT PSYCHIATRY PROGRESS NOTE - CURRENT MEDICATION
MEDICATIONS  (STANDING):  dextrose 5%. 1000 milliLiter(s) (50 mL/Hr) IV Continuous <Continuous>  dextrose 5%. 1000 milliLiter(s) (100 mL/Hr) IV Continuous <Continuous>  dextrose 50% Injectable 12.5 Gram(s) IV Push once  dextrose 50% Injectable 25 Gram(s) IV Push once  dextrose 50% Injectable 25 Gram(s) IV Push once  glucagon  Injectable 1 milliGRAM(s) IntraMuscular once  insulin glargine Injectable (LANTUS) 18 Unit(s) SubCutaneous at bedtime  insulin lispro (ADMELOG) corrective regimen sliding scale   SubCutaneous Before meals and at bedtime  insulin lispro Injectable (ADMELOG) 6 Unit(s) SubCutaneous before breakfast  insulin lispro Injectable (ADMELOG) 10 Unit(s) SubCutaneous before dinner  insulin lispro Injectable (ADMELOG) 10 Unit(s) SubCutaneous before lunch  melatonin. 5 milliGRAM(s) Oral at bedtime  QUEtiapine 200 milliGRAM(s) Oral at bedtime  QUEtiapine 125 milliGRAM(s) Oral daily    MEDICATIONS  (PRN):  acetaminophen     Tablet .. 650 milliGRAM(s) Oral every 6 hours PRN Moderate Pain (4 - 6)  aluminum hydroxide/magnesium hydroxide/simethicone Suspension 30 milliLiter(s) Oral every 6 hours PRN Dyspepsia  dextrose Oral Gel 15 Gram(s) Oral once PRN Blood Glucose LESS THAN 70 milliGRAM(s)/deciliter  diphenhydrAMINE 50 milliGRAM(s) Oral every 6 hours PRN agitation  haloperidol     Tablet 5 milliGRAM(s) Oral every 6 hours PRN agitation  magnesium hydroxide Suspension 30 milliLiter(s) Oral daily PRN Constipation  ondansetron Injectable 4 milliGRAM(s) IntraMuscular every 6 hours PRN nausea4

## 2023-12-15 NOTE — BH INPATIENT PSYCHIATRY PROGRESS NOTE - NSBHFUPINTERVALHXFT_PSY_A_CORE
Pt continues to progress well on the unit. She states that her medications have been helping and that she has not been experiencing any side effects. Pt has been seen walking on the unit, friendly with staff/peers. Pt continues to engage with treatment team to arrange for safe discharge plan.

## 2023-12-15 NOTE — BH INPATIENT PSYCHIATRY PROGRESS NOTE - NSTXPSYCHOGOALOTHER_PSY_ALL_CORE
Pt. will participate in groups and milieu tx. structure of unit
Pt will participate in groups and milieu treatment structure of the unit
Pt. will participate in groups and milieu tx. structure of unit
Pt will participate in groups and milieu treatment structure of the unit
Pt will participate in groups and milieu treatment structure of the unit
Pt. will participate in groups and milieu tx. structure of unit
Pt will participate in groups and milieu treatment structure of the unit
Pt will participate in groups and milieu treatment structure of the unit
Pt. will participate in groups and milieu tx. structure of unit

## 2023-12-15 NOTE — BH INPATIENT PSYCHIATRY PROGRESS NOTE - NSBHCHARTREVIEWVS_PSY_A_CORE FT
Vital Signs Last 24 Hrs  T(C): 37.1 (12-15-23 @ 09:02), Max: 37.5 (12-14-23 @ 16:29)  T(F): 98.8 (12-15-23 @ 09:02), Max: 99.5 (12-14-23 @ 16:29)  HR: 91 (12-15-23 @ 09:02) (91 - 96)  BP: 125/73 (12-15-23 @ 09:02) (117/77 - 125/73)  BP(mean): --  RR: 18 (12-15-23 @ 09:02) (16 - 18)  SpO2: 95% (12-15-23 @ 09:02) (95% - 98%)     Vital Signs Last 24 Hrs  T(C): 37.1 (12-19-23 @ 08:25), Max: 37.2 (12-18-23 @ 16:30)  T(F): 98.8 (12-19-23 @ 08:25), Max: 98.9 (12-18-23 @ 16:30)  HR: 100 (12-19-23 @ 08:25) (93 - 100)  BP: 117/80 (12-19-23 @ 08:25) (117/80 - 131/75)  BP(mean): --  RR: --  SpO2: 99% (12-19-23 @ 08:25) (96% - 99%)

## 2023-12-15 NOTE — BH INPATIENT PSYCHIATRY PROGRESS NOTE - NSBHMETABOLIC_PSY_ALL_CORE_FT
BMI: BMI (kg/m2): 19.5 (11-20-23 @ 16:55)  HbA1c: A1C with Estimated Average Glucose Result: 13.4 % (11-20-23 @ 22:35)    Glucose: POCT Blood Glucose.: 114 mg/dL (12-15-23 @ 11:37)    BP: 125/73 (12-15-23 @ 09:02) (117/77 - 148/87)Vital Signs Last 24 Hrs  T(C): 37.1 (12-15-23 @ 09:02), Max: 37.5 (12-14-23 @ 16:29)  T(F): 98.8 (12-15-23 @ 09:02), Max: 99.5 (12-14-23 @ 16:29)  HR: 91 (12-15-23 @ 09:02) (91 - 96)  BP: 125/73 (12-15-23 @ 09:02) (117/77 - 125/73)  BP(mean): --  RR: 18 (12-15-23 @ 09:02) (16 - 18)  SpO2: 95% (12-15-23 @ 09:02) (95% - 98%)    Lipid Panel:  BMI: BMI (kg/m2): 19.5 (11-20-23 @ 16:55)  HbA1c: A1C with Estimated Average Glucose Result: 13.4 % (11-20-23 @ 22:35)    Glucose: POCT Blood Glucose.: 103 mg/dL (12-19-23 @ 07:51)    BP: 117/80 (12-19-23 @ 08:25) (116/75 - 131/75)Vital Signs Last 24 Hrs  T(C): 37.1 (12-19-23 @ 08:25), Max: 37.2 (12-18-23 @ 16:30)  T(F): 98.8 (12-19-23 @ 08:25), Max: 98.9 (12-18-23 @ 16:30)  HR: 100 (12-19-23 @ 08:25) (93 - 100)  BP: 117/80 (12-19-23 @ 08:25) (117/80 - 131/75)  BP(mean): --  RR: --  SpO2: 99% (12-19-23 @ 08:25) (96% - 99%)      Lipid Panel:

## 2023-12-15 NOTE — BH INPATIENT PSYCHIATRY PROGRESS NOTE - NSBHATTESTBILLING_PSY_A_CORE
80591-Vyvshscdpr OBS or IP - low complexity OR 25-34 mins 61452-Kotqlfzghw OBS or IP - low complexity OR 25-34 mins

## 2023-12-15 NOTE — BH INPATIENT PSYCHIATRY PROGRESS NOTE - NSBHATTESTCOMMENTATTENDFT_PSY_A_CORE
For discharge on Monday. I spoke with pt in Syrian. Much improved on current regimen. BMP showed no signs of hyponatremia. Pt has been observed by staff to be "drinking a lot of water." I spoke with pt for 15 min and documented for 10 min none of which was comprised by teaching.  For discharge on Monday. I spoke with pt in Welsh. Much improved on current regimen. BMP showed no signs of hyponatremia. Pt has been observed by staff to be "drinking a lot of water." I spoke with pt for 15 min and documented for 10 min none of which was comprised by teaching.

## 2023-12-15 NOTE — BH INPATIENT PSYCHIATRY PROGRESS NOTE - NSBHASSESSSUMMFT_PSY_ALL_CORE
This is a 53-y.o. HF patient, Angolan-speaking, with reported history of schizophrenia, diabetes is presenting due to possible psychiatric complaints/AMS. Patient was found wandering and is not cooperative, initially admitted for hyperglycemia and psych CL team at transferring facility reported the patient was endorsing suicidal ideation. Pt transferred for [facility] on 2PC due to concerns that patient was a danger to herself.    While on the unit, the patient has been minimally participatory with treatment team. On arrival 11/30/23, the pt spoke for a few minutes with admitting attending but today unwilling to participate. Pt has limited PPHx in chart, but reportedly has a hx of schizophrenia and taking Seroquel 200 mg nightly but unable to confirm. Collateral information from the patient's sister and [facility] will attempt to obtain, given that the patient does not appear to have the capacity to consent for obtaining collateral information. Currently the working diagnosis is decompensated schizophrenia in the context of medication non-adherence. Plan to rule out organic brain disease with CT head without contrast, but pt likely unable to tolerate. Labs and EKG prior to transfer were not concerning. Pt has taken her Seroquel while on the unit, but patient may benefit from alternative psychotropic medication - will need additional PPHx before adjusting.     12/2 update: pt adherent with Seroquel, refusing to engage with writer and exhibiting some bizarre behaviors     12/3: I spoke to pt in Angolan. In contrast to yesterday, when she was licking the wall and telling the moonlighter she was the devil, she was much friendlier to me and spent much more time talking to me. Gave consent for me to speak with sisters if I can find their numbers. Internally preoccupied and staring at the wall when I first came in but then engaged in conversation. Will increase seroquel to 75/200.    12/4: Pt pleasant, keeping to self, but politely asked to wait until tomorrow to talk    12/5: With pt's consent - Attempted to call patient's mental health shelter - [facility] (Stephie Nicole -  - [phone]), Director - [facility] [phone], [phone]. Pt has shown notable improvement in her affect, her behavior is more organized and able to have linear conversation. Pt has been taking her medications as prescribed. Will increase her AM Seroquel dose to 100 mg    12/6/: Pt continues to show improvement on the unit. She has been adherent to her medication regimen and has been display more organized behavior and have linear conversation. Pt does appear to be repeating questions she has already asked the treatment team. Will plan on further workup if patient is able to tolerate. (CT head without contrast vs MRI head)    12/15: Pt continues to progress on the unit. Pt has better insight/judgment, more organized, and taking her medications as prescribed. Pt safe for discharge early next week once safe discharge plan is complete. Continue current medication regimen.    Plan:  1. Continue inpatient psychiatric admission on 2PC legal status - Pt unable to care for self, putting her at risk to harm herself and reports of suicidal ideation  2. Continue Seroquel 200 mg nightly for psychosis and 125 mg daily for psychosis.   3. Haldol 5 mg Q6H PRN for agitation  4. Ativan 1 mg po Q6H PRN for agitation  5. Benadryl 50 mg po Q6H PRN for agitation  6. Melatonin 5 mg nightly for insomnia  7. Continue diabetic regimen as previously prescribed This is a 53-y.o. HF patient, Citizen of [place]-speaking, with reported history of schizophrenia, diabetes is presenting due to possible psychiatric complaints/AMS. Patient was found wandering and is not cooperative, initially admitted for hyperglycemia and psych CL team at transferring facility reported the patient was endorsing suicidal ideation. Pt transferred for [facility] on 2PC due to concerns that patient was a danger to herself.    While on the unit, the patient has been minimally participatory with treatment team. On arrival 11/30/23, the pt spoke for a few minutes with admitting attending but today unwilling to participate. Pt has limited PPHx in chart, but reportedly has a hx of schizophrenia and taking Seroquel 200 mg nightly but unable to confirm. Collateral information from the patient's sister and [facility] will attempt to obtain, given that the patient does not appear to have the capacity to consent for obtaining collateral information. Currently the working diagnosis is decompensated schizophrenia in the context of medication non-adherence. Plan to rule out organic brain disease with CT head without contrast, but pt likely unable to tolerate. Labs and EKG prior to transfer were not concerning. Pt has taken her Seroquel while on the unit, but patient may benefit from alternative psychotropic medication - will need additional PPHx before adjusting.     12/2 update: pt adherent with Seroquel, refusing to engage with writer and exhibiting some bizarre behaviors     12/3: I spoke to pt in Citizen of [place]. In contrast to yesterday, when she was licking the wall and telling the moonlighter she was the devil, she was much friendlier to me and spent much more time talking to me. Gave consent for me to speak with sisters if I can find their numbers. Internally preoccupied and staring at the wall when I first came in but then engaged in conversation. Will increase seroquel to 75/200.    12/4: Pt pleasant, keeping to self, but politely asked to wait until tomorrow to talk    12/5: With pt's consent - Attempted to call patient's mental health shelter - [facility] (Stephie Nicole -  - [phone]), Director - [facility] [phone], [phone]. Pt has shown notable improvement in her affect, her behavior is more organized and able to have linear conversation. Pt has been taking her medications as prescribed. Will increase her AM Seroquel dose to 100 mg    12/6/: Pt continues to show improvement on the unit. She has been adherent to her medication regimen and has been display more organized behavior and have linear conversation. Pt does appear to be repeating questions she has already asked the treatment team. Will plan on further workup if patient is able to tolerate. (CT head without contrast vs MRI head)    12/15: Pt continues to progress on the unit. Pt has better insight/judgment, more organized, and taking her medications as prescribed. Pt safe for discharge early next week once safe discharge plan is complete. Continue current medication regimen.    Plan:  1. Continue inpatient psychiatric admission on 2PC legal status - Pt unable to care for self, putting her at risk to harm herself and reports of suicidal ideation  2. Continue Seroquel 200 mg nightly for psychosis and 125 mg daily for psychosis.   3. Haldol 5 mg Q6H PRN for agitation  4. Ativan 1 mg po Q6H PRN for agitation  5. Benadryl 50 mg po Q6H PRN for agitation  6. Melatonin 5 mg nightly for insomnia  7. Continue diabetic regimen as previously prescribed

## 2023-12-15 NOTE — BH INPATIENT PSYCHIATRY PROGRESS NOTE - NSTXPSYCHOINTERMD_PSY_ALL_CORE
seroquel, speak with [facility] ACT Team seroquel, speak with [facility] ACT Team

## 2023-12-15 NOTE — BH INPATIENT PSYCHIATRY PROGRESS NOTE - NSBHATTESTTYPEVISIT_PSY_A_CORE
Attending Only
Attending with Resident/Fellow/Student
Attending Only
Attending with Resident/Fellow/Student
Attending with Resident/Fellow/Student
Attending Only
Attending Only
Attending with Resident/Fellow/Student
Resident/Fellow with telephonic supervision

## 2023-12-16 LAB
GLUCOSE BLDC GLUCOMTR-MCNC: 100 MG/DL — HIGH (ref 70–99)
GLUCOSE BLDC GLUCOMTR-MCNC: 100 MG/DL — HIGH (ref 70–99)
GLUCOSE BLDC GLUCOMTR-MCNC: 111 MG/DL — HIGH (ref 70–99)
GLUCOSE BLDC GLUCOMTR-MCNC: 111 MG/DL — HIGH (ref 70–99)
GLUCOSE BLDC GLUCOMTR-MCNC: 198 MG/DL — HIGH (ref 70–99)
GLUCOSE BLDC GLUCOMTR-MCNC: 198 MG/DL — HIGH (ref 70–99)
GLUCOSE BLDC GLUCOMTR-MCNC: 223 MG/DL — HIGH (ref 70–99)
GLUCOSE BLDC GLUCOMTR-MCNC: 223 MG/DL — HIGH (ref 70–99)

## 2023-12-16 RX ADMIN — Medication 2: at 12:08

## 2023-12-16 RX ADMIN — INSULIN GLARGINE 18 UNIT(S): 100 INJECTION, SOLUTION SUBCUTANEOUS at 22:41

## 2023-12-16 RX ADMIN — Medication 5 MILLIGRAM(S): at 22:22

## 2023-12-16 RX ADMIN — Medication 1: at 22:41

## 2023-12-16 RX ADMIN — QUETIAPINE FUMARATE 200 MILLIGRAM(S): 200 TABLET, FILM COATED ORAL at 22:22

## 2023-12-16 RX ADMIN — QUETIAPINE FUMARATE 125 MILLIGRAM(S): 200 TABLET, FILM COATED ORAL at 10:18

## 2023-12-16 RX ADMIN — Medication 0: at 07:39

## 2023-12-16 RX ADMIN — Medication 650 MILLIGRAM(S): at 22:45

## 2023-12-16 RX ADMIN — Medication 6 UNIT(S): at 07:38

## 2023-12-16 RX ADMIN — Medication 10 UNIT(S): at 12:08

## 2023-12-16 RX ADMIN — Medication 650 MILLIGRAM(S): at 23:45

## 2023-12-17 LAB
GLUCOSE BLDC GLUCOMTR-MCNC: 148 MG/DL — HIGH (ref 70–99)
GLUCOSE BLDC GLUCOMTR-MCNC: 148 MG/DL — HIGH (ref 70–99)
GLUCOSE BLDC GLUCOMTR-MCNC: 154 MG/DL — HIGH (ref 70–99)
GLUCOSE BLDC GLUCOMTR-MCNC: 154 MG/DL — HIGH (ref 70–99)
GLUCOSE BLDC GLUCOMTR-MCNC: 230 MG/DL — HIGH (ref 70–99)
GLUCOSE BLDC GLUCOMTR-MCNC: 230 MG/DL — HIGH (ref 70–99)
GLUCOSE BLDC GLUCOMTR-MCNC: 99 MG/DL — SIGNIFICANT CHANGE UP (ref 70–99)
GLUCOSE BLDC GLUCOMTR-MCNC: 99 MG/DL — SIGNIFICANT CHANGE UP (ref 70–99)

## 2023-12-17 RX ADMIN — Medication 6 UNIT(S): at 08:06

## 2023-12-17 RX ADMIN — Medication 10 UNIT(S): at 11:55

## 2023-12-17 RX ADMIN — Medication 2: at 21:46

## 2023-12-17 RX ADMIN — Medication 1: at 11:55

## 2023-12-17 RX ADMIN — QUETIAPINE FUMARATE 200 MILLIGRAM(S): 200 TABLET, FILM COATED ORAL at 21:38

## 2023-12-17 RX ADMIN — Medication 10 UNIT(S): at 17:06

## 2023-12-17 RX ADMIN — QUETIAPINE FUMARATE 125 MILLIGRAM(S): 200 TABLET, FILM COATED ORAL at 11:04

## 2023-12-17 RX ADMIN — INSULIN GLARGINE 18 UNIT(S): 100 INJECTION, SOLUTION SUBCUTANEOUS at 21:45

## 2023-12-17 RX ADMIN — Medication 5 MILLIGRAM(S): at 21:38

## 2023-12-18 LAB
GLUCOSE BLDC GLUCOMTR-MCNC: 139 MG/DL — HIGH (ref 70–99)
GLUCOSE BLDC GLUCOMTR-MCNC: 139 MG/DL — HIGH (ref 70–99)
GLUCOSE BLDC GLUCOMTR-MCNC: 153 MG/DL — HIGH (ref 70–99)
GLUCOSE BLDC GLUCOMTR-MCNC: 153 MG/DL — HIGH (ref 70–99)
GLUCOSE BLDC GLUCOMTR-MCNC: 159 MG/DL — HIGH (ref 70–99)
GLUCOSE BLDC GLUCOMTR-MCNC: 159 MG/DL — HIGH (ref 70–99)
GLUCOSE BLDC GLUCOMTR-MCNC: 166 MG/DL — HIGH (ref 70–99)
GLUCOSE BLDC GLUCOMTR-MCNC: 166 MG/DL — HIGH (ref 70–99)

## 2023-12-18 PROCEDURE — 99254 IP/OBS CNSLTJ NEW/EST MOD 60: CPT | Mod: GC

## 2023-12-18 RX ORDER — INSULIN GLARGINE 100 [IU]/ML
18 INJECTION, SOLUTION SUBCUTANEOUS
Qty: 3 | Refills: 0
Start: 2023-12-18 | End: 2023-12-31

## 2023-12-18 RX ORDER — QUETIAPINE FUMARATE 200 MG/1
5 TABLET, FILM COATED ORAL
Qty: 70 | Refills: 0
Start: 2023-12-18 | End: 2023-12-31

## 2023-12-18 RX ADMIN — Medication 1: at 08:09

## 2023-12-18 RX ADMIN — Medication 650 MILLIGRAM(S): at 17:17

## 2023-12-18 RX ADMIN — Medication 10 UNIT(S): at 12:08

## 2023-12-18 RX ADMIN — QUETIAPINE FUMARATE 200 MILLIGRAM(S): 200 TABLET, FILM COATED ORAL at 21:36

## 2023-12-18 RX ADMIN — QUETIAPINE FUMARATE 125 MILLIGRAM(S): 200 TABLET, FILM COATED ORAL at 10:30

## 2023-12-18 RX ADMIN — Medication 10 UNIT(S): at 17:15

## 2023-12-18 RX ADMIN — Medication 6 UNIT(S): at 08:08

## 2023-12-18 RX ADMIN — Medication 5 MILLIGRAM(S): at 21:36

## 2023-12-18 RX ADMIN — Medication 1: at 12:07

## 2023-12-18 RX ADMIN — INSULIN GLARGINE 18 UNIT(S): 100 INJECTION, SOLUTION SUBCUTANEOUS at 21:35

## 2023-12-18 RX ADMIN — Medication 1: at 21:36

## 2023-12-18 NOTE — BH INPATIENT PSYCHIATRY PROGRESS NOTE - NSBHCONSBHPROVCNTCTNOFT_PSY_A_CORE
unable to contact

## 2023-12-18 NOTE — BH DISCHARGE NOTE NURSING/SOCIAL WORK/PSYCH REHAB - NSBHDCADDR1FT_A_CORE
[address] [facility] ([address])  [place] [address] [address] [facility] ([address])  [place] [address]

## 2023-12-18 NOTE — BH INPATIENT PSYCHIATRY PROGRESS NOTE - NSTXPSYCHODATETRGT_PSY_ALL_CORE
07-Dec-2023
19-Dec-2023
19-Dec-2023
07-Dec-2023
07-Dec-2023
12-Dec-2023
19-Dec-2023
19-Dec-2023
05-Dec-2023
12-Dec-2023

## 2023-12-18 NOTE — BH INPATIENT PSYCHIATRY PROGRESS NOTE - NSTXPSYCHOINTERMD_PSY_ALL_CORE
seroquel, speak with [facility] ACT Team seroquel, speak with [facility] ACT Team

## 2023-12-18 NOTE — BH INPATIENT PSYCHIATRY PROGRESS NOTE - NSBHFUPINTERVALHXFT_PSY_A_CORE
pt reports wanting to leave today. She states that she has been feeling well and taking all of her medications as prescribed. Pt reports that she is willing to follow up with outpatient treatment. Denies any complaints or side effects at this time.

## 2023-12-18 NOTE — CONSULT NOTE ADULT - ASSESSMENT
53-y.o. HF patient, Gambian-speaking, with reported history of schizophrenia, diabetes is presenting due to possible psychiatric complaints/AMS. Patient was found wandering and is not cooperative, initially admitted for hyperglycemia and psych CL team at transferring facility reported the patient was endorsing suicidal ideation. Pt transferred for [facility] on 2PC due to concerns that patient was a danger to herself. Medicine consulted for management of diabetes.     #DM, type 2  Pt initially admitted to medicine service for management of hyperglycemia  Endocrine consulted during that time and pt was initiated on Lantus 18u, Lispro 6u before breakfast and Lispro 10u before lunch and dinner with low dose sliding scale  pt's sugars appear controlled while on current regimen  prior to admission, pt states she has not used insulin on her own and was only on oral regimen    INCOMPLETE NOTE 53-y.o. HF patient, Paraguayan-speaking, with reported history of schizophrenia, diabetes is presenting due to possible psychiatric complaints/AMS. Patient was found wandering and is not cooperative, initially admitted for hyperglycemia and psych CL team at transferring facility reported the patient was endorsing suicidal ideation. Pt transferred for [facility] on 2PC due to concerns that patient was a danger to herself. Medicine consulted for management of diabetes.     #DM, type 2  Pt initially admitted to medicine service for management of hyperglycemia  Endocrine consulted during that time and pt was initiated on Lantus 18u, Lispro 6u before breakfast and Lispro 10u before lunch and dinner with low dose sliding scale  pt's sugars appear controlled while on current regimen  prior to admission, pt states she has not used insulin on her own and was only on oral regimen    INCOMPLETE NOTE 53-y.o. HF patient, Niuean-speaking, with reported history of schizophrenia, diabetes is presenting due to possible psychiatric complaints/AMS. Patient was found wandering and is not cooperative, initially admitted for hyperglycemia and psych CL team at transferring facility reported the patient was endorsing suicidal ideation. Pt transferred for [facility] on 2PC due to concerns that patient was a danger to herself. Medicine consulted for management of diabetes.     #DM, type 2 (A1c 13.4)  Pt initially admitted to medicine service for management of hyperglycemia  Endocrine consulted during that time and pt was initiated on Lantus 18u, Lispro 6u before breakfast and Lispro 10u before lunch and dinner with low dose sliding scale  pt's sugars appear controlled while on current regimen  prior to admission, pt states she has not used insulin on her own and was only on oral regimen  Recommend discussion final regimen with Endocrine prior to discharge to [facility]  Pt reports if daily insulin is required, she will obtain help from her sister to self-inject  Would benefit from diabetes educator as well    Med consult will continue to follow. Case discussed and seen with Dr. Fernández 53-y.o. HF patient, Belarusian-speaking, with reported history of schizophrenia, diabetes is presenting due to possible psychiatric complaints/AMS. Patient was found wandering and is not cooperative, initially admitted for hyperglycemia and psych CL team at transferring facility reported the patient was endorsing suicidal ideation. Pt transferred for [facility] on 2PC due to concerns that patient was a danger to herself. Medicine consulted for management of diabetes.     #DM, type 2 (A1c 13.4)  Pt initially admitted to medicine service for management of hyperglycemia  Endocrine consulted during that time and pt was initiated on Lantus 18u, Lispro 6u before breakfast and Lispro 10u before lunch and dinner with low dose sliding scale  pt's sugars appear controlled while on current regimen  prior to admission, pt states she has not used insulin on her own and was only on oral regimen  Recommend discussion final regimen with Endocrine prior to discharge to [facility]  Pt reports if daily insulin is required, she will obtain help from her sister to self-inject  Would benefit from diabetes educator as well    Med consult will continue to follow. Case discussed and seen with Dr. Fernández

## 2023-12-18 NOTE — BH INPATIENT PSYCHIATRY PROGRESS NOTE - PRN MEDS
MEDICATIONS  (PRN):  acetaminophen     Tablet .. 650 milliGRAM(s) Oral every 6 hours PRN Moderate Pain (4 - 6)  aluminum hydroxide/magnesium hydroxide/simethicone Suspension 30 milliLiter(s) Oral every 6 hours PRN Dyspepsia  dextrose Oral Gel 15 Gram(s) Oral once PRN Blood Glucose LESS THAN 70 milliGRAM(s)/deciliter  diphenhydrAMINE 50 milliGRAM(s) Oral every 6 hours PRN agitation  haloperidol     Tablet 5 milliGRAM(s) Oral every 6 hours PRN agitation  magnesium hydroxide Suspension 30 milliLiter(s) Oral daily PRN Constipation  ondansetron Injectable 4 milliGRAM(s) IntraMuscular every 6 hours PRN nausea

## 2023-12-18 NOTE — BH INPATIENT PSYCHIATRY DISCHARGE NOTE - NSDCMRMEDTOKEN_GEN_ALL_CORE_FT
insulin glargine 100 units/mL subcutaneous solution: 18 unit(s) subcutaneous once a day (at bedtime)  insulin lispro 100 units/mL injectable solution: subcutaneous 3 times a day (before meals) 1 Unit(s) if Glucose 151 - 200  2 Unit(s) if Glucose 201 - 250  3 Unit(s) if Glucose 251 - 300  4 Unit(s) if Glucose 301 - 350  5 Unit(s) if Glucose 351 - 400  6 Unit(s) if Glucose Greater Than 400  insulin lispro 100 units/mL injectable solution: subcutaneous once a day (at bedtime) 0 Unit(s) if Glucose 0 - 250  1 Unit(s) if Glucose 251 - 300  2 Unit(s) if Glucose 301 - 350  3 Unit(s) if Glucose 351 - 400  4 Unit(s) if Glucose Greater Than 400  insulin lispro 100 units/mL injectable solution: 6 unit(s) subcutaneous once a day before breakfast  insulin lispro 100 units/mL injectable solution: 10 unit(s) subcutaneous once a day before lunch  insulin lispro 100 units/mL injectable solution: 10 international unit(s) subcutaneous once a day before dinner  QUEtiapine 200 mg oral tablet: 1 tab(s) orally once a day (at bedtime)   insulin glargine 100 units/mL subcutaneous solution: 18 unit(s) subcutaneous once a day (at bedtime)  insulin glargine 100 units/mL subcutaneous solution: 18 unit(s) subcutaneous once a day (at bedtime)  insulin lispro 100 units/mL injectable solution: subcutaneous 3 times a day (before meals) 1 Unit(s) if Glucose 151 - 200  2 Unit(s) if Glucose 201 - 250  3 Unit(s) if Glucose 251 - 300  4 Unit(s) if Glucose 301 - 350  5 Unit(s) if Glucose 351 - 400  6 Unit(s) if Glucose Greater Than 400  insulin lispro 100 units/mL injectable solution: subcutaneous once a day (at bedtime) 0 Unit(s) if Glucose 0 - 250  1 Unit(s) if Glucose 251 - 300  2 Unit(s) if Glucose 301 - 350  3 Unit(s) if Glucose 351 - 400  4 Unit(s) if Glucose Greater Than 400  insulin lispro 100 units/mL injectable solution: 6 unit(s) subcutaneous once a day before breakfast  insulin lispro 100 units/mL injectable solution: 10 unit(s) subcutaneous once a day before lunch  insulin lispro 100 units/mL injectable solution: 10 international unit(s) subcutaneous once a day before dinner  QUEtiapine 200 mg oral tablet: 1 tab(s) orally once a day (at bedtime)  QUEtiapine 25 mg oral tablet: 5 tab(s) orally once a day   glimepiride 2 mg oral tablet: 1 tab(s) orally  metFORMIN 500 mg oral tablet, extended release: 1 tab(s) orally  QUEtiapine 200 mg oral tablet: 1 tab(s) orally once a day (at bedtime)  QUEtiapine 25 mg oral tablet: 5 tab(s) orally once a day

## 2023-12-18 NOTE — BH DISCHARGE NOTE NURSING/SOCIAL WORK/PSYCH REHAB - NSDPACMPNY_GEN_ALL_CORE
[facility] Gastroenterology  Pre Procedure History & Physical    Chief Complaint:   Heartburn    Subjective     HPI:   She has a long history of acid reflux.  Currently she is taking Pepcid Complete and is working for her.  She takes it once a day.  She has been taken in the afternoon.  Denies dysphagia.  She presents for endoscopy exam.  She did undergo endoscopy May 17 but unfortunately her pathology specimen was not received in the pathology department.  She presents today for repeat endoscopy with repeat biopsies.  She tells me everything is going well.    Past Medical History:   Past Medical History:   Diagnosis Date   • Arthritis    • Breast cancer (HCC)    • GERD (gastroesophageal reflux disease)    • Hiatal hernia    • Hypertension    • Hypokalemia    • Idiopathic hematuria    • Kidney stones    • Lipoma    • Osteopenia    • Renal disorder    • UTI (urinary tract infection)        Past Surgical History:  Past Surgical History:   Procedure Laterality Date   • BREAST LUMPECTOMY     • COLONOSCOPY      2011?   • COLONOSCOPY N/A 5/17/2022    Procedure: COLONOSCOPY WITH ANESTHESIA;  Surgeon: Saeed Tom MD;  Location: [facility] ENDOSCOPY;  Service: Gastroenterology;  Laterality: N/A;  preop; screening   postop; diverticulosis ; hemmhroids   PCP Shanelle Foster   • DILATION AND CURETTAGE, DIAGNOSTIC / THERAPEUTIC     • ENDOSCOPY N/A 5/17/2022    Procedure: ESOPHAGOGASTRODUODENOSCOPY WITH ANESTHESIA;  Surgeon: Saeed Tom MD;  Location: [facility] ENDOSCOPY;  Service: Gastroenterology;  Laterality: N/A;  preop; gerd  postop; esophagitis   PCP Shanelle Foster   • LIPOMA RESECTION     • OTHER SURGICAL HISTORY      rectocele   • RECTOCELE REPAIR     • WISDOM TOOTH EXTRACTION          Family History:  Family History   Problem Relation Age of Onset   • Hypertension Mother    • Diabetes Mother    • Stroke Mother    • Leukemia Father    • Lymphoma Father    • High cholesterol Brother    • Hypertension Brother    •  "Diabetes Brother    • Colon cancer Neg Hx    • Colon polyps Neg Hx        Social History:   reports that she has never smoked. She has never used smokeless tobacco. She reports current alcohol use. She reports previous drug use.    Medications:   Prior to Admission medications    Medication Sig Start Date End Date Taking? Authorizing Provider   amLODIPine (NORVASC) 5 MG tablet Take 1 tablet by mouth Daily. 2/8/22  Yes Thelma Foster APRN   anastrozole (ARIMIDEX) 1 MG tablet Take 1 mg by mouth.   Yes Albert Arzate MD   Biotin 5 MG tablet Take 3 capsules by mouth.   Yes Albert Arzate MD   hydroCHLOROthiazide (MICROZIDE) 12.5 MG capsule Take 12.5 mg by mouth.   Yes Albert Arzate MD   Multiple Vitamins-Calcium (ONE-A-DAY WOMENS FORMULA PO) One-A-Day Womens Formula   Yes Albert Arzate MD   potassium chloride (K-DUR,KLOR-CON) 20 MEQ CR tablet Take 1 tablet by mouth Daily. 4/4/22  Yes Thelma Foster APRN   Probiotic Product (PROBIOTIC-10 PO) Probiotic   Yes Albert Arzate MD   psyllium (METAMUCIL SMOOTH TEXTURE) 28 % packet Take  by mouth.   Yes Albert Arzate MD       Allergies:  Ace inhibitors, Omeprazole, Sulfa antibiotics, and Levofloxacin    ROS:    General: Weight stable  Resp: No SOA  Cardiovascular: No CP    Objective     Blood pressure 151/94, pulse 79, temperature 97.9 °F (36.6 °C), temperature source Temporal, resp. rate 19, height 162.6 cm (64\"), weight 89.7 kg (197 lb 11.2 oz), SpO2 98 %.    Physical Exam   Constitutional: Pt is oriented to person, place, and in no distress.   Cardiovascular: Normal rate, regular rhythm.    Pulmonary/Chest: Effort normal. No respiratory distress.    Abdominal: Non-distended.  Psychiatric: Mood, memory, affect and judgment appear normal.     Assessment & Plan     Diagnosis:  Heartburn    Anticipated Surgical Procedure:  Endoscopy    The risks, benefits, and alternatives of this procedure have been discussed with " the patient or the responsible party- the patient understands and agrees to proceed.    EMR Dragon/transcription disclaimer:  Much of this encounter note is electronic transcription/translation of spoken language to printed text.  The electronic translation of spoken language may be erroneous, or at times, nonsensical words or phrases may be inadvertently transcribed.  Although I have reviewed the note for such errors, some may still exist.   Traveling alone

## 2023-12-18 NOTE — BH INPATIENT PSYCHIATRY DISCHARGE NOTE - NSBHFUPINTERVALHXFT_PSY_A_CORE
[id number]  used on today's interview.  Pt's states that she is feeling well to be discharged today. She states that she feels like her medications has been helping and that she plans on continuing to her medication upon discharge. Pt denies any side effects from her medications. She reports continued auditory hallucinations, but denies any suicidal ideation. She states that she plans on following up with her ACT team to continue her treatment. Pt also states that she is looking forward to talking with her children upon discharge since she has not been able to talk with them since she was admitted into the hospital.  [id number]  used on today's interview.  Pt's states that she is feeling well to be discharged today. She states that she feels like her medications has been helping and that she plans on continuing to her medication upon discharge. Pt denies any side effects from her medications. She reports continued auditory hallucinations, but denies any suicidal ideation. She states that she plans on following up with her ACT team to continue her treatment. Pt also states that she is looking forward to talking with her children upon discharge since she has not been able to talk with them since she was admitted into the hospital.  Pt's states that she is feeling well to be discharged today. She states that she feels like her medications has been helping and that she plans on continuing to her medication upon discharge. Pt denies any side effects from her medications. Denies any suicidal ideation. She states that she plans on following up with her ACT team to continue her treatment.

## 2023-12-18 NOTE — BH DISCHARGE NOTE NURSING/SOCIAL WORK/PSYCH REHAB - [facility]
Unit Name: [address] Phone Number: [phone] Unit Name: [address] Phone Number: [phone]

## 2023-12-18 NOTE — BH INPATIENT PSYCHIATRY PROGRESS NOTE - NSBHASSESSSUMMFT_PSY_ALL_CORE
This is a 53-y.o. HF patient, Portuguese-speaking, with reported history of schizophrenia, diabetes is presenting due to possible psychiatric complaints/AMS. Patient was found wandering and is not cooperative, initially admitted for hyperglycemia and psych CL team at transferring facility reported the patient was endorsing suicidal ideation. Pt transferred for [facility] on 2PC due to concerns that patient was a danger to herself.    While on the unit, the patient has been minimally participatory with treatment team. On arrival 11/30/23, the pt spoke for a few minutes with admitting attending but today unwilling to participate. Pt has limited PPHx in chart, but reportedly has a hx of schizophrenia and taking Seroquel 200 mg nightly but unable to confirm. Collateral information from the patient's sister and PSYCKES will attempt to obtain, given that the patient does not appear to have the capacity to consent for obtaining collateral information. Currently the working diagnosis is decompensated schizophrenia in the context of medication non-adherence. Plan to rule out organic brain disease with CT head without contrast, but pt likely unable to tolerate. Labs and EKG prior to transfer were not concerning. Pt has taken her Seroquel while on the unit, but patient may benefit from alternative psychotropic medication - will need additional PPHx before adjusting.     12/2 update: pt adherent with Seroquel, refusing to engage with writer and exhibiting some bizarre behaviors     12/3: I spoke to pt in Portuguese. In contrast to yesterday, when she was licking the wall and telling the moonlighter she was the devil, she was much friendlier to me and spent much more time talking to me. Gave consent for me to speak with sisters if I can find their numbers. Internally preoccupied and staring at the wall when I first came in but then engaged in conversation. Will increase seroquel to 75/200.    12/4: Pt pleasant, keeping to self, but politely asked to wait until tomorrow to talk    12/5: With pt's consent - Attempted to call patient's mental health shelter - [facility] (Stephie Nicole -  - [phone]), Director - [facility] [phone], [phone]. Pt has shown notable improvement in her affect, her behavior is more organized and able to have linear conversation. Pt has been taking her medications as prescribed. Will increase her AM Seroquel dose to 100 mg    12/6/: Pt continues to show improvement on the unit. She has been adherent to her medication regimen and has been display more organized behavior and have linear conversation. Pt does appear to be repeating questions she has already asked the treatment team. Will plan on further workup if patient is able to tolerate. (CT head without contrast vs MRI head)    12/15: Pt continues to progress on the unit. Pt has better insight/judgment, more organized, and taking her medications as prescribed. Pt safe for discharge early next week once safe discharge plan is complete. Continue current medication regimen.    12/18: Medicine consult and endocrine consult placed to clarify outpatient diabetes medications prior to discharge. SW contacted ACT team to inform of discharge [email or web address]    Plan:  1. Continue inpatient psychiatric admission on 2PC legal status - Pt unable to care for self, putting her at risk to harm herself and reports of suicidal ideation - Likely DC tomorrow   2. Continue Seroquel 200 mg nightly for psychosis and 125 mg daily for psychosis.   3. Haldol 5 mg Q6H PRN for agitation  4. Ativan 1 mg po Q6H PRN for agitation  5. Benadryl 50 mg po Q6H PRN for agitation  6. Melatonin 5 mg nightly for insomnia  7. Continue diabetic regimen as previously prescribed This is a 53-y.o. HF patient, Namibian-speaking, with reported history of schizophrenia, diabetes is presenting due to possible psychiatric complaints/AMS. Patient was found wandering and is not cooperative, initially admitted for hyperglycemia and psych CL team at transferring facility reported the patient was endorsing suicidal ideation. Pt transferred for [facility] on 2PC due to concerns that patient was a danger to herself.    While on the unit, the patient has been minimally participatory with treatment team. On arrival 11/30/23, the pt spoke for a few minutes with admitting attending but today unwilling to participate. Pt has limited PPHx in chart, but reportedly has a hx of schizophrenia and taking Seroquel 200 mg nightly but unable to confirm. Collateral information from the patient's sister and PSYCKES will attempt to obtain, given that the patient does not appear to have the capacity to consent for obtaining collateral information. Currently the working diagnosis is decompensated schizophrenia in the context of medication non-adherence. Plan to rule out organic brain disease with CT head without contrast, but pt likely unable to tolerate. Labs and EKG prior to transfer were not concerning. Pt has taken her Seroquel while on the unit, but patient may benefit from alternative psychotropic medication - will need additional PPHx before adjusting.     12/2 update: pt adherent with Seroquel, refusing to engage with writer and exhibiting some bizarre behaviors     12/3: I spoke to pt in [place]ibian. In contrast to yesterday, when she was licking the wall and telling the moonlighter she was the devil, she was much friendlier to me and spent much more time talking to me. Gave consent for me to speak with sisters if I can find their numbers. Internally preoccupied and staring at the wall when I first came in but then engaged in conversation. Will increase seroquel to 75/200.    12/4: Pt pleasant, keeping to self, but politely asked to wait until tomorrow to talk    12/5: With pt's consent - Attempted to call patient's mental health shelter - [facility] (Stephie Nicole -  - [phone]), Director - [facility] [phone], [phone]. Pt has shown notable improvement in her affect, her behavior is more organized and able to have linear conversation. Pt has been taking her medications as prescribed. Will increase her AM Seroquel dose to 100 mg    12/6/: Pt continues to show improvement on the unit. She has been adherent to her medication regimen and has been display more organized behavior and have linear conversation. Pt does appear to be repeating questions she has already asked the treatment team. Will plan on further workup if patient is able to tolerate. (CT head without contrast vs MRI head)    12/15: Pt continues to progress on the unit. Pt has better insight/judgment, more organized, and taking her medications as prescribed. Pt safe for discharge early next week once safe discharge plan is complete. Continue current medication regimen.    12/18: Medicine consult and endocrine consult placed to clarify outpatient diabetes medications prior to discharge. SW contacted ACT team to inform of discharge [email or web address]    Plan:  1. Continue inpatient psychiatric admission on 2PC legal status - Pt unable to care for self, putting her at risk to harm herself and reports of suicidal ideation - Likely DC tomorrow   2. Continue Seroquel 200 mg nightly for psychosis and 125 mg daily for psychosis.   3. Haldol 5 mg Q6H PRN for agitation  4. Ativan 1 mg po Q6H PRN for agitation  5. Benadryl 50 mg po Q6H PRN for agitation  6. Melatonin 5 mg nightly for insomnia  7. Continue diabetic regimen as previously prescribed

## 2023-12-18 NOTE — BH SAFETY PLAN - WARNING SIGN 1
Pt completed a written safety plan via translation line ([place], [place] [id number]) and received a copy; additional copy is filed in the pt's chart Pt completed a written safety plan via translation line ([place], [place] [id number]) and received a copy; additional copy is filed in the pt's chart

## 2023-12-18 NOTE — BH INPATIENT PSYCHIATRY PROGRESS NOTE - NSTXPSYCHOGOAL_PSY_ALL_CORE
Other...
Will identify 2 coping skills that assist with focus on reality

## 2023-12-18 NOTE — BH DISCHARGE NOTE NURSING/SOCIAL WORK/PSYCH REHAB - NSDCADDINFO1FT_PSY_ALL_CORE
ACT team Will meet with pt at the shelter. Please makre sure you are ready to meet with your team. El equipo de ACT va ir a shelter en la fecha de 12/19/23. Ellos van a llegar a las 9 am. Porfavor de estar lista y disponible para recibirlos.

## 2023-12-18 NOTE — BH INPATIENT PSYCHIATRY PROGRESS NOTE - NSBHCHARTREVIEWVS_PSY_A_CORE FT
Vital Signs Last 24 Hrs  T(C): 37.2 (12-18-23 @ 08:21), Max: 37.2 (12-18-23 @ 08:21)  T(F): 99 (12-18-23 @ 08:21), Max: 99 (12-18-23 @ 08:21)  HR: 94 (12-18-23 @ 08:21) (94 - 94)  BP: 116/75 (12-18-23 @ 08:21) (116/75 - 116/75)  BP(mean): --  RR: --  SpO2: 97% (12-18-23 @ 08:21) (97% - 97%)

## 2023-12-18 NOTE — CONSULT NOTE ADULT - SUBJECTIVE AND OBJECTIVE BOX
This is a 53-y.o. HF patient, Welsh-speaking, with reported history of schizophrenia, diabetes is presenting due to possible psychiatric complaints/AMS. Patient was found wandering and is not cooperative, initially admitted for hyperglycemia and psych CL team at transferring facility reported the patient was endorsing suicidal ideation. Pt transferred for [facility] on 2PC due to concerns that patient was a danger to herself. (18 Dec 2023 10:23)    Medicine consulted for management of diabetes prior to discharge.    Subjective: Reports feeling well, looking forward to discharge tomorrow.     PAST MEDICAL/SURGICAL HISTORY  PAST MEDICAL & SURGICAL HISTORY:  Schizophrenia      Diabetes mellitus      HTN (hypertension)          REVIEW OF SYSTEMS:  CONSTITUTIONAL: No fever  RESPIRATORY: No cough, No shortness of breath  CARDIOVASCULAR: No chest pain, palpitations  GASTROINTESTINAL: No abdominal or epigastric pain. No nausea, vomiting,      T(C): 37.2 (12-18-23 @ 08:21), Max: 37.2 (12-18-23 @ 08:21)  HR: 94 (12-18-23 @ 08:21) (94 - 94)  BP: 116/75 (12-18-23 @ 08:21) (116/75 - 116/75)  RR: --  SpO2: 97% (12-18-23 @ 08:21) (97% - 97%)  Wt(kg): --Vital Signs Last 24 Hrs  T(C): 37.2 (18 Dec 2023 08:21), Max: 37.2 (18 Dec 2023 08:21)  T(F): 99 (18 Dec 2023 08:21), Max: 99 (18 Dec 2023 08:21)  HR: 94 (18 Dec 2023 08:21) (94 - 94)  BP: 116/75 (18 Dec 2023 08:21) (116/75 - 116/75)  BP(mean): --  RR: --  SpO2: 97% (18 Dec 2023 08:21) (97% - 97%)    Parameters below as of 18 Dec 2023 08:21  Patient On (Oxygen Delivery Method): room air        PHYSICAL EXAM:  GENERAL: NAD  HEAD:  Atraumatic, Normocephalic  EYES: EOMI, PERRLA, conjunctiva and sclera clear  ENMT:  mucous membranes mildly dry   NECK: Supple  NERVOUS SYSTEM:  Alert & Oriented X3, Good concentration      Consultant(s) Notes Reviewed:  [x ] YES  [ ] NO  Care Discussed with Consultants/Other Providers [ x] YES  [ ] NO    LABS:  CBC       BMP      CMP      PT/INR      Amylase/Lipase      RADIOLOGY & ADDITIONAL TESTS:    Imaging Personally Reviewed:  [ ] YES  [ ] NO   This is a 53-y.o. HF patient, Uzbek-speaking, with reported history of schizophrenia, diabetes is presenting due to possible psychiatric complaints/AMS. Patient was found wandering and is not cooperative, initially admitted for hyperglycemia and psych CL team at transferring facility reported the patient was endorsing suicidal ideation. Pt transferred for [facility] on 2PC due to concerns that patient was a danger to herself. (18 Dec 2023 10:23)    Medicine consulted for management of diabetes prior to discharge.    Subjective: Reports feeling well, looking forward to discharge tomorrow.     PAST MEDICAL/SURGICAL HISTORY  PAST MEDICAL & SURGICAL HISTORY:  Schizophrenia      Diabetes mellitus      HTN (hypertension)          REVIEW OF SYSTEMS:  CONSTITUTIONAL: No fever  RESPIRATORY: No cough, No shortness of breath  CARDIOVASCULAR: No chest pain, palpitations  GASTROINTESTINAL: No abdominal or epigastric pain. No nausea, vomiting,      T(C): 37.2 (12-18-23 @ 08:21), Max: 37.2 (12-18-23 @ 08:21)  HR: 94 (12-18-23 @ 08:21) (94 - 94)  BP: 116/75 (12-18-23 @ 08:21) (116/75 - 116/75)  RR: --  SpO2: 97% (12-18-23 @ 08:21) (97% - 97%)  Wt(kg): --Vital Signs Last 24 Hrs  T(C): 37.2 (18 Dec 2023 08:21), Max: 37.2 (18 Dec 2023 08:21)  T(F): 99 (18 Dec 2023 08:21), Max: 99 (18 Dec 2023 08:21)  HR: 94 (18 Dec 2023 08:21) (94 - 94)  BP: 116/75 (18 Dec 2023 08:21) (116/75 - 116/75)  BP(mean): --  RR: --  SpO2: 97% (18 Dec 2023 08:21) (97% - 97%)    Parameters below as of 18 Dec 2023 08:21  Patient On (Oxygen Delivery Method): room air        PHYSICAL EXAM:  GENERAL: NAD  HEAD:  Atraumatic, Normocephalic  EYES: EOMI, PERRLA, conjunctiva and sclera clear  ENMT:  mucous membranes mildly dry   NECK: Supple  NERVOUS SYSTEM:  Alert & Oriented X3, Good concentration      Consultant(s) Notes Reviewed:  [x ] YES  [ ] NO  Care Discussed with Consultants/Other Providers [ x] YES  [ ] NO    LABS:  CBC       BMP      CMP      PT/INR      Amylase/Lipase      RADIOLOGY & ADDITIONAL TESTS:    Imaging Personally Reviewed:  [ ] YES  [ ] NO

## 2023-12-18 NOTE — BH INPATIENT PSYCHIATRY DISCHARGE NOTE - HPI (INCLUDE ILLNESS QUALITY, SEVERITY, DURATION, TIMING, CONTEXT, MODIFYING FACTORS, ASSOCIATED SIGNS AND SYMPTOMS)
See below for HPI from chart note from transfer facility  "This is a 53-y.o. HF patient, Indonesian-speaking, with reported history of schizophrenia, diabetes is presenting due to possible psychiatric complaints/AMS. Patient was found wandering and is not cooperative. Admitted for hyperglycemia. Consult requested to evaluate patient for bizarre  behavior.     Previously, per the patient, she stated her cousin called the ambulance on her today because she was causing a "domestic issue".  She stated that she "thinks  her family called the ambulance because she talks too much". Reportedly she has not been taking her medications, however the patient did not know what medicine she is supposed to be on.  When asked if she wants to kill herself she says yes.  She does not elaborate on why or how she would do it.  She does admit to hearing voices she states that she hears people making noises and crying and making a mess.  She lives in a shelter.  Denies any recreational drug use.  No other complaints."    See below for HPI from chart note from transfer facility  "This is a 53-y.o. HF patient, [place]-speaking, with reported history of schizophrenia, diabetes is presenting due to possible psychiatric complaints/AMS. Patient was found wandering and is not cooperative. Admitted for hyperglycemia. Consult requested to evaluate patient for bizarre  behavior.     Previously, per the patient, she stated her cousin called the ambulance on her today because she was causing a "domestic issue".  She stated that she "thinks  her family called the ambulance because she talks too much". Reportedly she has not been taking her medications, however the patient did not know what medicine she is supposed to be on.  When asked if she wants to kill herself she says yes.  She does not elaborate on why or how she would do it.  She does admit to hearing voices she states that she hears people making noises and crying and making a mess.  She lives in a shelter.  Denies any recreational drug use.  No other complaints."

## 2023-12-18 NOTE — BH INPATIENT PSYCHIATRY PROGRESS NOTE - NSBHMETABOLIC_PSY_ALL_CORE_FT
BMI: BMI (kg/m2): 19.5 (11-20-23 @ 16:55)  HbA1c: A1C with Estimated Average Glucose Result: 13.4 % (11-20-23 @ 22:35)    Glucose: POCT Blood Glucose.: 166 mg/dL (12-18-23 @ 12:05)    BP: 116/75 (12-18-23 @ 08:21) (108/70 - 128/77)Vital Signs Last 24 Hrs  T(C): 37.2 (12-18-23 @ 08:21), Max: 37.2 (12-18-23 @ 08:21)  T(F): 99 (12-18-23 @ 08:21), Max: 99 (12-18-23 @ 08:21)  HR: 94 (12-18-23 @ 08:21) (94 - 94)  BP: 116/75 (12-18-23 @ 08:21) (116/75 - 116/75)  BP(mean): --  RR: --  SpO2: 97% (12-18-23 @ 08:21) (97% - 97%)

## 2023-12-18 NOTE — BH INPATIENT PSYCHIATRY DISCHARGE NOTE - DESCRIPTION
Unknown - Per report, the patient is homeless. Pt's sister Cindy (Occ[place] Speaking) is listed as contact Unknown - Per report, the patient is homeless. Pt's sister Cindy ([place] Speaking) is listed as contact

## 2023-12-18 NOTE — BH INPATIENT PSYCHIATRY DISCHARGE NOTE - NSBHASSESSSUMMFT_PSY_ALL_CORE
This is a 53-y.o. HF patient, South African-speaking, with reported history of schizophrenia, diabetes is presenting due to possible psychiatric complaints/AMS. Patient was found wandering and is not cooperative, initially admitted for hyperglycemia and psych CL team at transferring facility reported the patient was endorsing suicidal ideation. Pt transferred for [facility] on 2PC due to concerns that patient was a danger to herself.    While on the unit, the patient has been minimally participatory with treatment team. On arrival 11/30/23, the pt spoke for a few minutes with admitting attending but today unwilling to participate. Pt has limited PPHx in chart, but reportedly has a hx of schizophrenia and taking Seroquel 200 mg nightly but unable to confirm. Collateral information from the patient's sister and PSYCKES will attempt to obtain, given that the patient does not appear to have the capacity to consent for obtaining collateral information. Currently the working diagnosis is decompensated schizophrenia in the context of medication non-adherence. Plan to rule out organic brain disease with CT head without contrast, but pt likely unable to tolerate. Labs and EKG prior to transfer were not concerning. Pt has taken her Seroquel while on the unit, but patient may benefit from alternative psychotropic medication - will need additional PPHx before adjusting.     12/2 update: pt adherent with Seroquel, refusing to engage with writer and exhibiting some bizarre behaviors     12/3: I spoke to pt in South African. In contrast to yesterday, when she was licking the wall and telling the moonlighter she was the devil, she was much friendlier to me and spent much more time talking to me. Gave consent for me to speak with sisters if I can find their numbers. Internally preoccupied and staring at the wall when I first came in but then engaged in conversation. Will increase seroquel to 75/200.    12/4: Pt pleasant, keeping to self, but politely asked to wait until tomorrow to talk    12/5: With pt's consent - Attempted to call patient's mental health shelter - [facility] (Stephie Nicole -  - [phone]), Director - [facility] [phone], [phone]. Pt has shown notable improvement in her affect, her behavior is more organized and able to have linear conversation. Pt has been taking her medications as prescribed. Will increase her AM Seroquel dose to 100 mg    12/6/: Pt continues to show improvement on the unit. She has been adherent to her medication regimen and has been display more organized behavior and have linear conversation. Pt does appear to be repeating questions she has already asked the treatment team. Will plan on further workup if patient is able to tolerate. (CT head without contrast vs MRI head)    12/15: Pt continues to progress on the unit. Pt has better insight/judgment, more organized, and taking her medications as prescribed. Pt safe for discharge early next week once safe discharge plan is complete. Continue current medication regimen.    12/18: Pt's states that she is feeling well to be discharged today. She states that she feels like her medications has been helping and that she plans on continuing to her medication upon discharge. Pt denies any side effects from her medications. She reports continued auditory hallucinations, but denies any suicidal ideation. She states that she plans on following up with her ACT team to continue her treatment. Pt also states that she is looking forward to talking with her children upon discharge since she has not been able to talk with them since she was admitted into the hospital.     Plan:  1. Discharge from the hospital today, plan to follow up with outpatient ACT treatment.  2. Continue Seroquel 200 mg nightly for psychosis and 125 mg daily for psychosis.    This is a 53-y.o. HF patient, Greenlandic-speaking, with reported history of schizophrenia, diabetes is presenting due to possible psychiatric complaints/AMS. Patient was found wandering and is not cooperative, initially admitted for hyperglycemia and psych CL team at transferring facility reported the patient was endorsing suicidal ideation. Pt transferred for [facility] on 2PC due to concerns that patient was a danger to herself.    While on the unit, the patient has been minimally participatory with treatment team. On arrival 11/30/23, the pt spoke for a few minutes with admitting attending but today unwilling to participate. Pt has limited PPHx in chart, but reportedly has a hx of schizophrenia and taking Seroquel 200 mg nightly but unable to confirm. Collateral information from the patient's sister and PSYCKES will attempt to obtain, given that the patient does not appear to have the capacity to consent for obtaining collateral information. Currently the working diagnosis is decompensated schizophrenia in the context of medication non-adherence. Plan to rule out organic brain disease with CT head without contrast, but pt likely unable to tolerate. Labs and EKG prior to transfer were not concerning. Pt has taken her Seroquel while on the unit, but patient may benefit from alternative psychotropic medication - will need additional PPHx before adjusting.     12/2 update: pt adherent with Seroquel, refusing to engage with writer and exhibiting some bizarre behaviors     12/3: I spoke to pt in Greenlandic. In contrast to yesterday, when she was licking the wall and telling the moonlighter she was the devil, she was much friendlier to me and spent much more time talking to me. Gave consent for me to speak with sisters if I can find their numbers. Internally preoccupied and staring at the wall when I first came in but then engaged in conversation. Will increase seroquel to 75/200.    12/4: Pt pleasant, keeping to self, but politely asked to wait until tomorrow to talk    12/5: With pt's consent - Attempted to call patient's mental health shelter - [facility] (Stephie Nicole -  - [phone]), Director - [facility] [phone], [phone]. Pt has shown notable improvement in her affect, her behavior is more organized and able to have linear conversation. Pt has been taking her medications as prescribed. Will increase her AM Seroquel dose to 100 mg    12/6/: Pt continues to show improvement on the unit. She has been adherent to her medication regimen and has been display more organized behavior and have linear conversation. Pt does appear to be repeating questions she has already asked the treatment team. Will plan on further workup if patient is able to tolerate. (CT head without contrast vs MRI head)    12/15: Pt continues to progress on the unit. Pt has better insight/judgment, more organized, and taking her medications as prescribed. Pt safe for discharge early next week once safe discharge plan is complete. Continue current medication regimen.    12/18: Pt's states that she is feeling well to be discharged today. She states that she feels like her medications has been helping and that she plans on continuing to her medication upon discharge. Pt denies any side effects from her medications. She reports continued auditory hallucinations, but denies any suicidal ideation. She states that she plans on following up with her ACT team to continue her treatment. Pt also states that she is looking forward to talking with her children upon discharge since she has not been able to talk with them since she was admitted into the hospital.     Plan:  1. Discharge from the hospital today, plan to follow up with outpatient ACT treatment.  2. Continue Seroquel 200 mg nightly for psychosis and 125 mg daily for psychosis.    This is a 53-y.o. HF patient, Marshallese-speaking, with reported history of schizophrenia, diabetes is presenting due to possible psychiatric complaints/AMS. Patient was found wandering and is not cooperative, initially admitted for hyperglycemia and psych CL team at transferring facility reported the patient was endorsing suicidal ideation. Pt transferred for [facility] on 2PC due to concerns that patient was a danger to herself.    While on the unit, the patient has been minimally participatory with treatment team. On arrival 11/30/23, the pt spoke for a few minutes with admitting attending but today unwilling to participate. Pt has limited PPHx in chart, but reportedly has a hx of schizophrenia and taking Seroquel 200 mg nightly but unable to confirm. Collateral information from the patient's sister and PSYCKES will attempt to obtain, given that the patient does not appear to have the capacity to consent for obtaining collateral information. Currently the working diagnosis is decompensated schizophrenia in the context of medication non-adherence. Plan to rule out organic brain disease with CT head without contrast, but pt likely unable to tolerate. Labs and EKG prior to transfer were not concerning. Pt has taken her Seroquel while on the unit, but patient may benefit from alternative psychotropic medication - will need additional PPHx before adjusting.     12/2 update: pt adherent with Seroquel, refusing to engage with writer and exhibiting some bizarre behaviors     12/3: I spoke to pt in Marshallese. In contrast to yesterday, when she was licking the wall and telling the moonlighter she was the devil, she was much friendlier to me and spent much more time talking to me. Gave consent for me to speak with sisters if I can find their numbers. Internally preoccupied and staring at the wall when I first came in but then engaged in conversation. Will increase seroquel to 75/200.    12/4: Pt pleasant, keeping to self, but politely asked to wait until tomorrow to talk    12/5: With pt's consent - Attempted to call patient's mental health shelter - [facility] (Stephie Nicole -  - [phone]), Director - [facility] [phone], [phone]. Pt has shown notable improvement in her affect, her behavior is more organized and able to have linear conversation. Pt has been taking her medications as prescribed. Will increase her AM Seroquel dose to 100 mg    12/6/: Pt continues to show improvement on the unit. She has been adherent to her medication regimen and has been display more organized behavior and have linear conversation. Pt does appear to be repeating questions she has already asked the treatment team. Will plan on further workup if patient is able to tolerate. (CT head without contrast vs MRI head)    12/15: Pt continues to progress on the unit. Pt has better insight/judgment, more organized, and taking her medications as prescribed. Pt safe for discharge early next week once safe discharge plan is complete. Continue current medication regimen.    12/18: Pt's states that she is feeling well to be discharged today. She states that she feels like her medications has been helping and that she plans on continuing to her medication upon discharge. Pt denies any side effects from her medications. She reports continued auditory hallucinations, but denies any suicidal ideation. She states that she plans on following up with her ACT team to continue her treatment. Pt also states that she is looking forward to talking with her children upon discharge since she has not been able to talk with them since she was admitted into the hospital.     12/19: Pt continues to do well while on the unit. Denies any complaints. Is future-oriented and looking forward to being discharged. 7 day supply of medications to be sent to [facility]. Coordinated with patient's ACT team.    Plan:  1. Discharge from the hospital today, plan to follow up with outpatient ACT treatment.  2. Continue Seroquel 200 mg nightly for psychosis and 125 mg daily for psychosis.    This is a 53-y.o. HF patient, British-speaking, with reported history of schizophrenia, diabetes is presenting due to possible psychiatric complaints/AMS. Patient was found wandering and is not cooperative, initially admitted for hyperglycemia and psych CL team at transferring facility reported the patient was endorsing suicidal ideation. Pt transferred for [facility] on 2PC due to concerns that patient was a danger to herself.    While on the unit, the patient has been minimally participatory with treatment team. On arrival 11/30/23, the pt spoke for a few minutes with admitting attending but today unwilling to participate. Pt has limited PPHx in chart, but reportedly has a hx of schizophrenia and taking Seroquel 200 mg nightly but unable to confirm. Collateral information from the patient's sister and PSYCKES will attempt to obtain, given that the patient does not appear to have the capacity to consent for obtaining collateral information. Currently the working diagnosis is decompensated schizophrenia in the context of medication non-adherence. Plan to rule out organic brain disease with CT head without contrast, but pt likely unable to tolerate. Labs and EKG prior to transfer were not concerning. Pt has taken her Seroquel while on the unit, but patient may benefit from alternative psychotropic medication - will need additional PPHx before adjusting.     12/2 update: pt adherent with Seroquel, refusing to engage with writer and exhibiting some bizarre behaviors     12/3: I spoke to pt in British. In contrast to yesterday, when she was licking the wall and telling the moonlighter she was the devil, she was much friendlier to me and spent much more time talking to me. Gave consent for me to speak with sisters if I can find their numbers. Internally preoccupied and staring at the wall when I first came in but then engaged in conversation. Will increase seroquel to 75/200.    12/4: Pt pleasant, keeping to self, but politely asked to wait until tomorrow to talk    12/5: With pt's consent - Attempted to call patient's mental health shelter - [facility] (Stephie Nicole -  - [phone]), Director - [facility] [phone], [phone]. Pt has shown notable improvement in her affect, her behavior is more organized and able to have linear conversation. Pt has been taking her medications as prescribed. Will increase her AM Seroquel dose to 100 mg    12/6/: Pt continues to show improvement on the unit. She has been adherent to her medication regimen and has been display more organized behavior and have linear conversation. Pt does appear to be repeating questions she has already asked the treatment team. Will plan on further workup if patient is able to tolerate. (CT head without contrast vs MRI head)    12/15: Pt continues to progress on the unit. Pt has better insight/judgment, more organized, and taking her medications as prescribed. Pt safe for discharge early next week once safe discharge plan is complete. Continue current medication regimen.    12/18: Pt's states that she is feeling well to be discharged today. She states that she feels like her medications has been helping and that she plans on continuing to her medication upon discharge. Pt denies any side effects from her medications. She reports continued auditory hallucinations, but denies any suicidal ideation. She states that she plans on following up with her ACT team to continue her treatment. Pt also states that she is looking forward to talking with her children upon discharge since she has not been able to talk with them since she was admitted into the hospital.     12/19: Pt continues to do well while on the unit. Denies any complaints. Is future-oriented and looking forward to being discharged. 7 day supply of medications to be sent to [facility]. Coordinated with patient's ACT team.    Plan:  1. Discharge from the hospital today, plan to follow up with outpatient ACT treatment.  2. Continue Seroquel 200 mg nightly for psychosis and 125 mg daily for psychosis.    This is a 53-y.o. HF patient, English-speaking, with reported history of schizophrenia, diabetes is presenting due to possible psychiatric complaints/AMS. Patient was found wandering and is not cooperative, initially admitted for hyperglycemia and psych CL team at transferring facility reported the patient was endorsing suicidal ideation. Pt transferred for [facility] on 2PC due to concerns that patient was a danger to herself.    While on the unit, the patient has been minimally participatory with treatment team. On arrival 11/30/23, the pt spoke for a few minutes with admitting attending but today unwilling to participate. Pt has limited PPHx in chart, but reportedly has a hx of schizophrenia and taking Seroquel 200 mg nightly but unable to confirm. Collateral information from the patient's sister and PSYCKES will attempt to obtain, given that the patient does not appear to have the capacity to consent for obtaining collateral information. Currently the working diagnosis is decompensated schizophrenia in the context of medication non-adherence. Plan to rule out organic brain disease with CT head without contrast, but pt likely unable to tolerate. Labs and EKG prior to transfer were not concerning. Pt has taken her Seroquel while on the unit, but patient may benefit from alternative psychotropic medication - will need additional PPHx before adjusting.     12/2 update: pt adherent with Seroquel, refusing to engage with writer and exhibiting some bizarre behaviors     12/3: I spoke to pt in English. In contrast to yesterday, when she was licking the wall and telling the moonlighter she was the devil, she was much friendlier to me and spent much more time talking to me. Gave consent for me to speak with sisters if I can find their numbers. Internally preoccupied and staring at the wall when I first came in but then engaged in conversation. Will increase seroquel to 75/200.    12/4: Pt pleasant, keeping to self, but politely asked to wait until tomorrow to talk    12/5: With pt's consent - Attempted to call patient's mental health shelter - [facility] (Stephie Nicole -  - [phone]), Director - [facility] [phone], [phone]. Pt has shown notable improvement in her affect, her behavior is more organized and able to have linear conversation. Pt has been taking her medications as prescribed. Will increase her AM Seroquel dose to 100 mg    12/6/: Pt continues to show improvement on the unit. She has been adherent to her medication regimen and has been display more organized behavior and have linear conversation. Pt does appear to be repeating questions she has already asked the treatment team. Will plan on further workup if patient is able to tolerate. (CT head without contrast vs MRI head)    12/15: Pt continues to progress on the unit. Pt has better insight/judgment, more organized, and taking her medications as prescribed. Pt safe for discharge early next week once safe discharge plan is complete. Continue current medication regimen.    12/18: Pt's states that she is feeling well to be discharged today. She states that she feels like her medications has been helping and that she plans on continuing to her medication upon discharge. Pt denies any side effects from her medications. She reports continued auditory hallucinations, but denies any suicidal ideation. She states that she plans on following up with her ACT team to continue her treatment. Pt also states that she is looking forward to talking with her children upon discharge since she has not been able to talk with them since she was admitted into the hospital.     12/19: Pt continues to do well while on the unit. Denies any complaints. Is future-oriented and looking forward to being discharged. 7 day supply of medications to be sent to [facility]. Coordinated with patient's ACT team.  [id number] used during today's interview    Plan:  1. Discharge from the hospital today, plan to follow up with outpatient ACT treatment.  2. Continue Seroquel 200 mg nightly for psychosis and 125 mg daily for psychosis.   3. Per endocrinology recommendations - Metformin 500 mg ER daily for diabetes and Glimepiride 2 mg daily with breakfast   This is a 53-y.o. HF patient, Greenlandic-speaking, with reported history of schizophrenia, diabetes is presenting due to possible psychiatric complaints/AMS. Patient was found wandering and is not cooperative, initially admitted for hyperglycemia and psych CL team at transferring facility reported the patient was endorsing suicidal ideation. Pt transferred for [facility] on 2PC due to concerns that patient was a danger to herself.    While on the unit, the patient has been minimally participatory with treatment team. On arrival 11/30/23, the pt spoke for a few minutes with admitting attending but today unwilling to participate. Pt has limited PPHx in chart, but reportedly has a hx of schizophrenia and taking Seroquel 200 mg nightly but unable to confirm. Collateral information from the patient's sister and PSYCKES will attempt to obtain, given that the patient does not appear to have the capacity to consent for obtaining collateral information. Currently the working diagnosis is decompensated schizophrenia in the context of medication non-adherence. Plan to rule out organic brain disease with CT head without contrast, but pt likely unable to tolerate. Labs and EKG prior to transfer were not concerning. Pt has taken her Seroquel while on the unit, but patient may benefit from alternative psychotropic medication - will need additional PPHx before adjusting.     12/2 update: pt adherent with Seroquel, refusing to engage with writer and exhibiting some bizarre behaviors     12/3: I spoke to pt in Greenlandic. In contrast to yesterday, when she was licking the wall and telling the moonlighter she was the devil, she was much friendlier to me and spent much more time talking to me. Gave consent for me to speak with sisters if I can find their numbers. Internally preoccupied and staring at the wall when I first came in but then engaged in conversation. Will increase seroquel to 75/200.    12/4: Pt pleasant, keeping to self, but politely asked to wait until tomorrow to talk    12/5: With pt's consent - Attempted to call patient's mental health shelter - [facility] (Stephie Nicole -  - [phone]), Director - [facility] [phone], [phone]. Pt has shown notable improvement in her affect, her behavior is more organized and able to have linear conversation. Pt has been taking her medications as prescribed. Will increase her AM Seroquel dose to 100 mg    12/6/: Pt continues to show improvement on the unit. She has been adherent to her medication regimen and has been display more organized behavior and have linear conversation. Pt does appear to be repeating questions she has already asked the treatment team. Will plan on further workup if patient is able to tolerate. (CT head without contrast vs MRI head)    12/15: Pt continues to progress on the unit. Pt has better insight/judgment, more organized, and taking her medications as prescribed. Pt safe for discharge early next week once safe discharge plan is complete. Continue current medication regimen.    12/18: Pt's states that she is feeling well to be discharged today. She states that she feels like her medications has been helping and that she plans on continuing to her medication upon discharge. Pt denies any side effects from her medications. She reports continued auditory hallucinations, but denies any suicidal ideation. She states that she plans on following up with her ACT team to continue her treatment. Pt also states that she is looking forward to talking with her children upon discharge since she has not been able to talk with them since she was admitted into the hospital.     12/19: Pt continues to do well while on the unit. Denies any complaints. Is future-oriented and looking forward to being discharged. 7 day supply of medications to be sent to [facility]. Coordinated with patient's ACT team.  [id number] used during today's interview    Plan:  1. Discharge from the hospital today, plan to follow up with outpatient ACT treatment.  2. Continue Seroquel 200 mg nightly for psychosis and 125 mg daily for psychosis.   3. Per endocrinology recommendations - Metformin 500 mg ER daily for diabetes and Glimepiride 2 mg daily with breakfast

## 2023-12-18 NOTE — BH DISCHARGE NOTE NURSING/SOCIAL WORK/PSYCH REHAB - PATIENT PORTAL LINK FT
You can access the FollowMyHealth Patient Portal offered by [facility] by registering at the following website: [email or web address]. By joining [employer]’s FollowMyHealth portal, you will also be able to view your health information using other applications (apps) compatible with our system. You can access the FollowMyHealth Patient Portal offered by [facility] by registering at the following website: [email or web address]. By joining [employer]’s FollowMyHealth portal, you will also be able to view your health information using other applications (apps) compatible with our system.

## 2023-12-18 NOTE — BH INPATIENT PSYCHIATRY DISCHARGE NOTE - HOSPITAL COURSE
Pt initially presented to [facility] ED after her cousin called an ambulance for the patient displaying disorganized behavior in the community. While in the ED she reported suicidal ideation and was transferred to [facility] for inpatient psychiatric hospitalization. On arrival, she remained disorganized, withdrawn, and paranoid. She appeared to be responding to internal stimuli and unable to have linear conversation with treatment team. She was agreeable to take her Seroquel which was titrated up to 125 mg daily and 200 mg at night for psychosis and mood stabilization. Pt showed notable improvement of her symptoms, she no longer denied suicidal ideation and had a full, bright affect. Pt initially presented to [facility] ED after her cousin called an ambulance for the patient displaying disorganized behavior in the community. While in the ED she reported suicidal ideation and was transferred to [facility] for inpatient psychiatric hospitalization. On arrival, she remained disorganized, withdrawn, and paranoid. She appeared to be responding to internal stimuli and unable to have linear conversation with treatment team. She was agreeable to take her Seroquel which was titrated up to 125 mg daily and 200 mg at night for psychosis and mood stabilization. Pt showed notable improvement of her symptoms, she no longer denied suicidal ideation and had a full, bright affect.

## 2023-12-18 NOTE — BH INPATIENT PSYCHIATRY PROGRESS NOTE - ADDITIONAL DETAILS / COMMENTS
Patient Refused Physical Exam

## 2023-12-18 NOTE — BH INPATIENT PSYCHIATRY PROGRESS NOTE - NSTXPSYCHODATEEST_PSY_ALL_CORE
05-Dec-2023
30-Nov-2023
12-Dec-2023
30-Nov-2023
12-Dec-2023
12-Dec-2023
30-Nov-2023
05-Dec-2023
12-Dec-2023
30-Nov-2023

## 2023-12-18 NOTE — BH INPATIENT PSYCHIATRY DISCHARGE NOTE - REASON FOR ADMISSION
This is a 53-y.o. HF patient, Hebrew-speaking, with reported history of schizophrenia, diabetes is presenting due to possible psychiatric complaints/AMS. Patient was found wandering and is not cooperative, initially admitted for hyperglycemia and psych CL team at transferring facility reported the patient was endorsing suicidal ideation. Pt transferred for [facility] on 2PC due to concerns that patient was a danger to herself. This is a 53-y.o. HF patient, Yakut-speaking, with reported history of schizophrenia, diabetes is presenting due to possible psychiatric complaints/AMS. Patient was found wandering and is not cooperative, initially admitted for hyperglycemia and psych CL team at transferring facility reported the patient was endorsing suicidal ideation. Pt transferred for [facility] on 2PC due to concerns that patient was a danger to herself.

## 2023-12-18 NOTE — BH INPATIENT PSYCHIATRY DISCHARGE NOTE - ATTENDING DISCHARGE PHYSICAL EXAMINATION:
;;12/18: interviewed with resident and with LLS #[id number] in Bangladeshi; Future oriented; looks forward to seeing her children; reports some AH but not morbid; mood is good. Alert; oriented; cognition intact; speech clear; no tremor or evidence of movement impairment. no SI or HI.  generally cheerful.  Thinking is congruent with affect; no pecularities of thinking or language use. slightly anxious.  i&J: poor: limited if any awareness of medications; guarded or minimally acknowledging sxs; not reflective on issues that impact on symptoms ; however expresses a willingness to take her medications.  ;;12/18: interviewed with resident and with LLS #[id number] in Djiboutian; Future oriented; looks forward to seeing her children; reports some AH but not morbid; mood is good. Alert; oriented; cognition intact; speech clear; no tremor or evidence of movement impairment. no SI or HI.  generally cheerful.  Thinking is congruent with affect; no pecularities of thinking or language use. slightly anxious.  i&J: poor: limited if any awareness of medications; guarded or minimally acknowledging sxs; not reflective on issues that impact on symptoms ; however expresses a willingness to take her medications.  Pt seen alongside Resident.  #[id number] was utilized for translational services during the encounter.  Pt denies SI/HI/AH/VH. No side effects to medications reported or observed. Pt may be safely discharged to her ACT Team.  Pt seen alongside Resident.  #[id number] was utilized for translational services during the encounter.  Pt denies SI/HI/AH/VH. No side effects to medications reported or observed. Pt may be safely discharged to her ACT Team.

## 2023-12-18 NOTE — CONSULT NOTE ADULT - ATTENDING COMMENTS
53-y.o. F  Zambian-speaking, undomiciled with PMHx of schizophrenia, diabetes is presenting due to possible psychiatric complaints/AMS. Patient was found wandering and is not cooperative, initially admitted for hyperglycemia and psych CL team at transferring facility reported the patient was endorsing suicidal ideation. Pt transferred for [facility] ( 11/30) on 2PC due to concerns that patient was a danger to herself. Medicine consulted  (12/18)  for management of diabetes regimen for discharge.     Chart reviewed. Pt interviewed with Dr. Anna.  ID # [id number]  Pt reports she was not on insulin prior hospitalization but she is willing to learn insulin injection if that's the best way to control her diabetes    # uncontrolled DM, type 2 (A1c 13.4)  - Endocrine consult for the final DM regimen  - currently FS controlled on Lantus 18U sq qHS, Lispro 6U AC breakfast and 10U AC lunch and dinner   - Pt reports her sister can help her if insulin injection is required   - Diabetes educator to  on pt   - followup Endocrine clinic     Med consult will continue to follow. Thank you. 53-y.o. F  Wallisian-speaking, undomiciled with PMHx of schizophrenia, diabetes is presenting due to possible psychiatric complaints/AMS. Patient was found wandering and is not cooperative, initially admitted for hyperglycemia and psych CL team at transferring facility reported the patient was endorsing suicidal ideation. Pt transferred for [facility] ( 11/30) on 2PC due to concerns that patient was a danger to herself. Medicine consulted  (12/18)  for management of diabetes regimen for discharge.     Chart reviewed. Pt interviewed with Dr. Anna.  ID # [id number]  Pt reports she was not on insulin prior hospitalization but she is willing to learn insulin injection if that's the best way to control her diabetes    # uncontrolled DM, type 2 (A1c 13.4)  - Endocrine consult for the final DM regimen  - currently FS controlled on Lantus 18U sq qHS, Lispro 6U AC breakfast and 10U AC lunch and dinner   - Pt reports her sister can help her if insulin injection is required   - Diabetes educator to  on pt   - followup Endocrine clinic     Med consult will continue to follow. Thank you.

## 2023-12-18 NOTE — BH DISCHARGE NOTE NURSING/SOCIAL WORK/PSYCH REHAB - FACILITY ADDRESS
[address] [facility] [id number] [address] [facility] [address]

## 2023-12-18 NOTE — BH INPATIENT PSYCHIATRY PROGRESS NOTE - NSBHMSEATTEN_PSY_A_CORE
Unable to assess
Normal
Unable to assess
Normal
Unable to assess

## 2023-12-18 NOTE — BH INPATIENT PSYCHIATRY DISCHARGE NOTE - NSBHMETABOLIC_PSY_ALL_CORE_FT
BMI: BMI (kg/m2): 19.5 (11-20-23 @ 16:55)  HbA1c: A1C with Estimated Average Glucose Result: 13.4 % (11-20-23 @ 22:35)    Glucose: POCT Blood Glucose.: 153 mg/dL (12-18-23 @ 07:41)    BP: 116/75 (12-18-23 @ 08:21) (108/70 - 132/69)Vital Signs Last 24 Hrs  T(C): 37.2 (12-18-23 @ 08:21), Max: 37.2 (12-18-23 @ 08:21)  T(F): 99 (12-18-23 @ 08:21), Max: 99 (12-18-23 @ 08:21)  HR: 94 (12-18-23 @ 08:21) (94 - 94)  BP: 116/75 (12-18-23 @ 08:21) (116/75 - 116/75)  BP(mean): --  RR: --  SpO2: 97% (12-18-23 @ 08:21) (97% - 97%)      Lipid Panel: Completed prior to transfer

## 2023-12-18 NOTE — BH INPATIENT PSYCHIATRY DISCHARGE NOTE - NSBHDCBILLING_PSY_ALL_CORE
[id number] (Hospital discharge day management; more than 30 min) [id number] (Hospital discharge day management; more than 30 min)

## 2023-12-18 NOTE — BH DISCHARGE NOTE NURSING/SOCIAL WORK/PSYCH REHAB - NSDCPRGOAL_PSY_ALL_CORE
Over the course of tx, pt. continually declined (verbally and non-verbally) to invitations to groups of all therapeutic modalities; it is not evident she was interactive with roommate or the milieu, with exception to those who were also Belarusian-speaking; pt.'s mood and insight fluctuated from child-like to dismissive, on occasion apologizing for a recent outburst; pt.'s wellness plan was conducted with a , however, pt. was resistant and slightly paranoid regarding the suggestion that she has anything beyond medical needs, not mental; pt. did commit to taking her medicine and was less resistant by the end of the phone call. Pt. did not endorse a strong support system, but knew she could call her doctor if she needed help;    Over the course of tx, pt. continually declined (verbally and non-verbally) to invitations to groups of all therapeutic modalities; it is not evident she was interactive with roommate or the milieu, with exception to those who were also Turkish-speaking; pt.'s mood and insight fluctuated from child-like to dismissive, on occasion apologizing for a recent outburst; pt.'s wellness plan was conducted with a , however, pt. was resistant and slightly paranoid regarding the suggestion that she has anything beyond medical needs, not mental; pt. did commit to taking her medicine and was less resistant by the end of the phone call. Pt. did not endorse a strong support system, but knew she could call her doctor if she needed help;

## 2023-12-18 NOTE — BH SAFETY PLAN - SUICIDE PREVENTION LIFELINE PHONES
Suicide Prevention Lifeline Phone: [phone] TALK ([phone]) Suicide Prevention Lifeline Phone: [phone]- [phone] ([phone])

## 2023-12-18 NOTE — BH DISCHARGE NOTE NURSING/SOCIAL WORK/PSYCH REHAB - NSCDUDCCRISIS_PSY_A_CORE
[facility] Well  1 [phone]) [facility] ([phone])  Text "WELL" to 00822  Website: www.[employer]/.National Suicide Prevention Lifeline 0 (002) 095-9286/.  [employer]  1 (162) LIFENET ([phone])/988 Suicide and Crisis Lifeline [facility] Well  1 (191) [phone][facility]  Text "WELL" to 71841  Website: www.[employer]/.National Suicide Prevention Lifeline 9 (448) 008-3524/.  [employer]  1 (545) LIFENET (772-9474)/988 Suicide and Crisis Lifeline

## 2023-12-18 NOTE — BH INPATIENT PSYCHIATRY PROGRESS NOTE - CURRENT MEDICATION
MEDICATIONS  (STANDING):  dextrose 5%. 1000 milliLiter(s) (50 mL/Hr) IV Continuous <Continuous>  dextrose 5%. 1000 milliLiter(s) (100 mL/Hr) IV Continuous <Continuous>  dextrose 50% Injectable 25 Gram(s) IV Push once  dextrose 50% Injectable 12.5 Gram(s) IV Push once  dextrose 50% Injectable 25 Gram(s) IV Push once  glucagon  Injectable 1 milliGRAM(s) IntraMuscular once  insulin glargine Injectable (LANTUS) 18 Unit(s) SubCutaneous at bedtime  insulin lispro (ADMELOG) corrective regimen sliding scale   SubCutaneous Before meals and at bedtime  insulin lispro Injectable (ADMELOG) 6 Unit(s) SubCutaneous before breakfast  insulin lispro Injectable (ADMELOG) 10 Unit(s) SubCutaneous before dinner  insulin lispro Injectable (ADMELOG) 10 Unit(s) SubCutaneous before lunch  melatonin. 5 milliGRAM(s) Oral at bedtime  QUEtiapine 200 milliGRAM(s) Oral at bedtime  QUEtiapine 125 milliGRAM(s) Oral daily    MEDICATIONS  (PRN):  acetaminophen     Tablet .. 650 milliGRAM(s) Oral every 6 hours PRN Moderate Pain (4 - 6)  aluminum hydroxide/magnesium hydroxide/simethicone Suspension 30 milliLiter(s) Oral every 6 hours PRN Dyspepsia  dextrose Oral Gel 15 Gram(s) Oral once PRN Blood Glucose LESS THAN 70 milliGRAM(s)/deciliter  diphenhydrAMINE 50 milliGRAM(s) Oral every 6 hours PRN agitation  haloperidol     Tablet 5 milliGRAM(s) Oral every 6 hours PRN agitation  magnesium hydroxide Suspension 30 milliLiter(s) Oral daily PRN Constipation  ondansetron Injectable 4 milliGRAM(s) IntraMuscular every 6 hours PRN nausea4

## 2023-12-19 VITALS
TEMPERATURE: 99 F | DIASTOLIC BLOOD PRESSURE: 80 MMHG | HEART RATE: 100 BPM | OXYGEN SATURATION: 99 % | SYSTOLIC BLOOD PRESSURE: 117 MMHG

## 2023-12-19 DIAGNOSIS — E11.9 TYPE 2 DIABETES MELLITUS WITHOUT COMPLICATIONS: ICD-10-CM

## 2023-12-19 LAB
GLUCOSE BLDC GLUCOMTR-MCNC: 103 MG/DL — HIGH (ref 70–99)
GLUCOSE BLDC GLUCOMTR-MCNC: 103 MG/DL — HIGH (ref 70–99)
GLUCOSE BLDC GLUCOMTR-MCNC: 141 MG/DL — HIGH (ref 70–99)
GLUCOSE BLDC GLUCOMTR-MCNC: 141 MG/DL — HIGH (ref 70–99)
GLUCOSE BLDC GLUCOMTR-MCNC: 175 MG/DL — HIGH (ref 70–99)
GLUCOSE BLDC GLUCOMTR-MCNC: 175 MG/DL — HIGH (ref 70–99)

## 2023-12-19 PROCEDURE — 99253 IP/OBS CNSLTJ NEW/EST LOW 45: CPT

## 2023-12-19 PROCEDURE — 99239 HOSP IP/OBS DSCHRG MGMT >30: CPT

## 2023-12-19 PROCEDURE — 85027 COMPLETE CBC AUTOMATED: CPT

## 2023-12-19 PROCEDURE — 99232 SBSQ HOSP IP/OBS MODERATE 35: CPT | Mod: GC

## 2023-12-19 PROCEDURE — 80053 COMPREHEN METABOLIC PANEL: CPT

## 2023-12-19 PROCEDURE — 36415 COLL VENOUS BLD VENIPUNCTURE: CPT

## 2023-12-19 PROCEDURE — 82962 GLUCOSE BLOOD TEST: CPT

## 2023-12-19 RX ORDER — GLIMEPIRIDE 1 MG
1 TABLET ORAL
Qty: 7 | Refills: 0
Start: 2023-12-19 | End: 2023-12-25

## 2023-12-19 RX ORDER — METFORMIN HYDROCHLORIDE 850 MG/1
1 TABLET ORAL
Qty: 7 | Refills: 0
Start: 2023-12-19 | End: 2023-12-25

## 2023-12-19 RX ORDER — QUETIAPINE FUMARATE 200 MG/1
1 TABLET, FILM COATED ORAL
Qty: 7 | Refills: 0
Start: 2023-12-19 | End: 2023-12-25

## 2023-12-19 RX ORDER — QUETIAPINE FUMARATE 200 MG/1
5 TABLET, FILM COATED ORAL
Qty: 35 | Refills: 0
Start: 2023-12-19 | End: 2023-12-25

## 2023-12-19 RX ADMIN — QUETIAPINE FUMARATE 125 MILLIGRAM(S): 200 TABLET, FILM COATED ORAL at 09:45

## 2023-12-19 RX ADMIN — Medication 10 UNIT(S): at 11:54

## 2023-12-19 RX ADMIN — Medication 650 MILLIGRAM(S): at 12:00

## 2023-12-19 RX ADMIN — Medication 650 MILLIGRAM(S): at 11:54

## 2023-12-19 RX ADMIN — Medication 650 MILLIGRAM(S): at 13:00

## 2023-12-19 RX ADMIN — Medication 10 UNIT(S): at 17:07

## 2023-12-19 RX ADMIN — Medication 1: at 17:06

## 2023-12-19 NOTE — CONSULT NOTE ADULT - SUBJECTIVE AND OBJECTIVE BOX
HISTORY OF PRESENT ILLNESS  This is a 53-y.o. HF patient, Turkmen-speaking, with reported history of schizophrenia, diabetes is presenting due to possible psychiatric complaints/AMS. Patient was found wandering and is not cooperative, initially admitted for hyperglycemia and psych CL team at transferring facility reported the patient was endorsing suicidal ideation. Pt transferred for [facility] on 2PC due to concerns that patient was a danger to herself.   Patient transferred from [facility] on 11/30. At [facility], she was seen by endocrinology and was transferred on Lantus 18 units HS and admelog 6-10-10 with meals, which was continued at [facility] with glucose mostly at target.  Our endocrine service consulted at discharge for assistance with discharge recommendations, as she has not taken insulin in the past.    CAPILLARY BLOOD GLUCOSE & INSULIN RECEIVED  223 mg/dL (12-16 @ 11:42)  111 mg/dL (12-16 @ 16:35)  198 mg/dL (12-16 @ 22:40)  99 mg/dL (12-17 @ 08:05)  154 mg/dL (12-17 @ 11:51)  148 mg/dL (12-17 @ 17:02)  230 mg/dL (12-17 @ 21:41)  153 mg/dL (12-18 @ 07:41)  166 mg/dL (12-18 @ 12:05)  139 mg/dL (12-18 @ 17:13)  159 mg/dL (12-18 @ 21:31)  103 mg/dL (12-19 @ 07:51)      DIABETES HISTORY  - Age at diagnosis:   - Symptoms at time of diagnosis:   - Current Therapy:  - History of other regimens:   - History of hypoglycemia:   - History of DKA/HHS:   - Complications:   - Home FSG:        > Fasting: *** mg/dL.        > Before meals: *** mg/dL.        > Bedtime: *** mg/dL.  - Diet:          > Breakfast:         > Lunch:        > Dinner:        > Snacks:  - Physical activity:    - Outpatient follow-up:     PAST MEDICAL & SURGICAL HISTORY  As per history of present illness.     FAMILY HISTORY  - Diabetes:  - Thyroid:  - Autoimmune:  - Other:    SOCIAL HISTORY  - Work:  - Alcohol:  - Smoking:  - Recreational Drugs:    ALLERGIES  No Known Allergies    CURRENT MEDICATIONS  acetaminophen     Tablet .. 650 milliGRAM(s) Oral every 6 hours PRN  aluminum hydroxide/magnesium hydroxide/simethicone Suspension 30 milliLiter(s) Oral every 6 hours PRN  dextrose 5%. 1000 milliLiter(s) IV Continuous <Continuous>  dextrose 5%. 1000 milliLiter(s) IV Continuous <Continuous>  dextrose 50% Injectable 25 Gram(s) IV Push once  dextrose 50% Injectable 12.5 Gram(s) IV Push once  dextrose 50% Injectable 25 Gram(s) IV Push once  dextrose Oral Gel 15 Gram(s) Oral once PRN  diphenhydrAMINE 50 milliGRAM(s) Oral every 6 hours PRN  glucagon  Injectable 1 milliGRAM(s) IntraMuscular once  haloperidol     Tablet 5 milliGRAM(s) Oral every 6 hours PRN  insulin glargine Injectable (LANTUS) 18 Unit(s) SubCutaneous at bedtime  insulin lispro (ADMELOG) corrective regimen sliding scale   SubCutaneous Before meals and at bedtime  insulin lispro Injectable (ADMELOG) 6 Unit(s) SubCutaneous before breakfast  insulin lispro Injectable (ADMELOG) 10 Unit(s) SubCutaneous before dinner  insulin lispro Injectable (ADMELOG) 10 Unit(s) SubCutaneous before lunch  magnesium hydroxide Suspension 30 milliLiter(s) Oral daily PRN  melatonin. 5 milliGRAM(s) Oral at bedtime  ondansetron Injectable 4 milliGRAM(s) IntraMuscular every 6 hours PRN  QUEtiapine 200 milliGRAM(s) Oral at bedtime  QUEtiapine 125 milliGRAM(s) Oral daily    REVIEW OF SYSTEMS  Constitutional:  Negative fever, chills or loss of appetite.  Eyes:  Negative blurry vision or double vision.  Cardiovascular:  Negative for chest pain or palpitations.  Respiratory:  Negative for cough, wheezing, or shortness of breath.   Gastrointestinal:  Negative for nausea, vomiting, diarrhea, constipation, or abdominal pain.  Genitourinary:  Negative frequency, urgency or dysuria.  Neurologic:  No headache, confusion, dizziness, lightheadedness.    PHYSICAL EXAM  Vital Signs Last 24 Hrs  T(C): 37.2 (18 Dec 2023 16:30), Max: 37.2 (18 Dec 2023 16:30)  T(F): 98.9 (18 Dec 2023 16:30), Max: 98.9 (18 Dec 2023 16:30)  HR: 93 (18 Dec 2023 16:30) (93 - 93)  BP: 131/75 (18 Dec 2023 16:30) (131/75 - 131/75)  BP(mean): --  RR: --  SpO2: 96% (18 Dec 2023 16:30) (96% - 96%)    Parameters below as of 18 Dec 2023 16:30  Patient On (Oxygen Delivery Method): room air    Constitutional: Awake, alert, in no acute distress.   HEENT: Normocephalic, atraumatic, MARY, no proptosis or lid retraction.   Neck: supple, no acanthosis, no thyromegaly or palpable thyroid nodules.  Respiratory: Lungs clear to ausculation bilaterally.   Cardiovascular: regular rhythm, normal S1 and S2, no audible murmurs.   GI: soft, non-tender, non-distended, bowel sounds present, no masses appreciated.  Extremities: No lower extremity edema, peripheral pulses present.   Skin: no rashes.   Psychiatric: AAO x 3. Normal affect/mood.     LABS  CBC - WBC/HGB/HTC/PLT: 9.47/11.3/34.1/298 (12-12-23)  BMP: Na/K/Cl/Bicarb/BUN/Cr/Gluc: 139/4.3/103/25/20/0.65/173 (12-12-23)  Anion Gap: 11 (12-12-23)  eGFR: 107 (12-12-23)  Calcium: 9.4 (12-12-23)  Phosphorus: -- (12-12-23)  Magnesium: -- (12-12-23)  LFT - Alb/Tprot/Tbili/Dbili/AlkPhos/ALT/AST: 3.8/--/0.2/--/140/56/34 (12-12-23)    Thyroid Stimulating Hormone, Serum: 2.44 (11-22-23)  Total T4/Free T4: --/1.2 (11-22-23)      HISTORY OF PRESENT ILLNESS  This is a 53-y.o. HF patient, Kinyarwanda-speaking, with reported history of schizophrenia, diabetes is presenting due to possible psychiatric complaints/AMS. Patient was found wandering and is not cooperative, initially admitted for hyperglycemia and psych CL team at transferring facility reported the patient was endorsing suicidal ideation. Pt transferred for [facility] on 2PC due to concerns that patient was a danger to herself.   Patient transferred from [facility] on 11/30. At [facility], she was seen by endocrinology and was transferred on Lantus 18 units HS and admelog 6-10-10 with meals, which was continued at [facility] with glucose mostly at target.  Our endocrine service consulted at discharge for assistance with discharge recommendations, as she has not taken insulin in the past.    CAPILLARY BLOOD GLUCOSE & INSULIN RECEIVED  223 mg/dL (12-16 @ 11:42)  111 mg/dL (12-16 @ 16:35)  198 mg/dL (12-16 @ 22:40)  99 mg/dL (12-17 @ 08:05)  154 mg/dL (12-17 @ 11:51)  148 mg/dL (12-17 @ 17:02)  230 mg/dL (12-17 @ 21:41)  153 mg/dL (12-18 @ 07:41)  166 mg/dL (12-18 @ 12:05)  139 mg/dL (12-18 @ 17:13)  159 mg/dL (12-18 @ 21:31)  103 mg/dL (12-19 @ 07:51)      DIABETES HISTORY  - Age at diagnosis:   - Symptoms at time of diagnosis:   - Current Therapy:  - History of other regimens:   - History of hypoglycemia:   - History of DKA/HHS:   - Complications:   - Home FSG:        > Fasting: *** mg/dL.        > Before meals: *** mg/dL.        > Bedtime: *** mg/dL.  - Diet:          > Breakfast:         > Lunch:        > Dinner:        > Snacks:  - Physical activity:    - Outpatient follow-up:     PAST MEDICAL & SURGICAL HISTORY  As per history of present illness.     FAMILY HISTORY  - Diabetes:  - Thyroid:  - Autoimmune:  - Other:    SOCIAL HISTORY  - Work:  - Alcohol:  - Smoking:  - Recreational Drugs:    ALLERGIES  No Known Allergies    CURRENT MEDICATIONS  acetaminophen     Tablet .. 650 milliGRAM(s) Oral every 6 hours PRN  aluminum hydroxide/magnesium hydroxide/simethicone Suspension 30 milliLiter(s) Oral every 6 hours PRN  dextrose 5%. 1000 milliLiter(s) IV Continuous <Continuous>  dextrose 5%. 1000 milliLiter(s) IV Continuous <Continuous>  dextrose 50% Injectable 25 Gram(s) IV Push once  dextrose 50% Injectable 12.5 Gram(s) IV Push once  dextrose 50% Injectable 25 Gram(s) IV Push once  dextrose Oral Gel 15 Gram(s) Oral once PRN  diphenhydrAMINE 50 milliGRAM(s) Oral every 6 hours PRN  glucagon  Injectable 1 milliGRAM(s) IntraMuscular once  haloperidol     Tablet 5 milliGRAM(s) Oral every 6 hours PRN  insulin glargine Injectable (LANTUS) 18 Unit(s) SubCutaneous at bedtime  insulin lispro (ADMELOG) corrective regimen sliding scale   SubCutaneous Before meals and at bedtime  insulin lispro Injectable (ADMELOG) 6 Unit(s) SubCutaneous before breakfast  insulin lispro Injectable (ADMELOG) 10 Unit(s) SubCutaneous before dinner  insulin lispro Injectable (ADMELOG) 10 Unit(s) SubCutaneous before lunch  magnesium hydroxide Suspension 30 milliLiter(s) Oral daily PRN  melatonin. 5 milliGRAM(s) Oral at bedtime  ondansetron Injectable 4 milliGRAM(s) IntraMuscular every 6 hours PRN  QUEtiapine 200 milliGRAM(s) Oral at bedtime  QUEtiapine 125 milliGRAM(s) Oral daily    REVIEW OF SYSTEMS  Constitutional:  Negative fever, chills or loss of appetite.  Eyes:  Negative blurry vision or double vision.  Cardiovascular:  Negative for chest pain or palpitations.  Respiratory:  Negative for cough, wheezing, or shortness of breath.   Gastrointestinal:  Negative for nausea, vomiting, diarrhea, constipation, or abdominal pain.  Genitourinary:  Negative frequency, urgency or dysuria.  Neurologic:  No headache, confusion, dizziness, lightheadedness.    PHYSICAL EXAM  Vital Signs Last 24 Hrs  T(C): 37.2 (18 Dec 2023 16:30), Max: 37.2 (18 Dec 2023 16:30)  T(F): 98.9 (18 Dec 2023 16:30), Max: 98.9 (18 Dec 2023 16:30)  HR: 93 (18 Dec 2023 16:30) (93 - 93)  BP: 131/75 (18 Dec 2023 16:30) (131/75 - 131/75)  BP(mean): --  RR: --  SpO2: 96% (18 Dec 2023 16:30) (96% - 96%)    Parameters below as of 18 Dec 2023 16:30  Patient On (Oxygen Delivery Method): room air    Constitutional: Awake, alert, in no acute distress.   HEENT: Normocephalic, atraumatic, MARY, no proptosis or lid retraction.   Neck: supple, no acanthosis, no thyromegaly or palpable thyroid nodules.  Respiratory: Lungs clear to ausculation bilaterally.   Cardiovascular: regular rhythm, normal S1 and S2, no audible murmurs.   GI: soft, non-tender, non-distended, bowel sounds present, no masses appreciated.  Extremities: No lower extremity edema, peripheral pulses present.   Skin: no rashes.   Psychiatric: AAO x 3. Normal affect/mood.     LABS  CBC - WBC/HGB/HTC/PLT: 9.47/11.3/34.1/298 (12-12-23)  BMP: Na/K/Cl/Bicarb/BUN/Cr/Gluc: 139/4.3/103/25/20/0.65/173 (12-12-23)  Anion Gap: 11 (12-12-23)  eGFR: 107 (12-12-23)  Calcium: 9.4 (12-12-23)  Phosphorus: -- (12-12-23)  Magnesium: -- (12-12-23)  LFT - Alb/Tprot/Tbili/Dbili/AlkPhos/ALT/AST: 3.8/--/0.2/--/140/56/34 (12-12-23)    Thyroid Stimulating Hormone, Serum: 2.44 (11-22-23)  Total T4/Free T4: --/1.2 (11-22-23)      HISTORY OF PRESENT ILLNESS  This is a 53-y.o. HF patient, Maltese-speaking, with reported history of schizophrenia, diabetes is presenting due to possible psychiatric complaints/AMS. Patient was found wandering and is not cooperative, initially admitted for hyperglycemia and psych CL team at transferring facility reported the patient was endorsing suicidal ideation. Pt transferred for [facility] on 2PC due to concerns that patient was a danger to herself.   Patient transferred from [facility] on 11/30. At [facility], she was seen by endocrinology and was transferred on Lantus 18 units HS and admelog 6-10-10 with meals, which was continued at [facility] with glucose mostly at target.  Our endocrine service consulted at discharge for assistance with discharge recommendations, as she has not taken insulin in the past.    Pt approached in the hallway and initially declined conversation and  services. Floor SW assisted with conversation and interpretation. She is a poor historian and often doesn't answer questions asked. Unclear how long she has had diabetes. She reports being on oral medication in the past that she took 3 times/day, but doesn't recall name and hasn't taken in awhile. She does not monitor FSG and declines to do so. Asked if ever hospitalized for hyperglycemia/DKA but she did not answer the question directly. Her diet does seems appropriate with no extra sweets, juices, or sodas. [facility]. She has been eating well here with moderate carbs and minimal snacking inbetween meals overnight. She is RAJI pending and will continue her care at [facility].    CAPILLARY BLOOD GLUCOSE & INSULIN RECEIVED  223 mg/dL (12-16 @ 11:42) - Lispro 10+2  111 mg/dL (12-16 @ 16:35)  198 mg/dL (12-16 @ 22:40) - Lantus 18 + lispro 1  99 mg/dL (12-17 @ 08:05) - Lispro 6  154 mg/dL (12-17 @ 11:51) - Lispro 10+1  148 mg/dL (12-17 @ 17:02) - Lispro 10  230 mg/dL (12-17 @ 21:41) - Lantus 18 + lispro 2  153 mg/dL (12-18 @ 07:41) - Lispro 6+1  166 mg/dL (12-18 @ 12:05) - Lispro 10+1  139 mg/dL (12-18 @ 17:13) - Lispro 10. Ate salmon, beans, and ice cream.  159 mg/dL (12-18 @ 21:31) - Lantus 18 + lispro 1  103 mg/dL (12-19 @ 07:51) - held for value. Ate eggs and cereal.    ALLERGIES  No Known Allergies    CURRENT MEDICATIONS  acetaminophen     Tablet .. 650 milliGRAM(s) Oral every 6 hours PRN  aluminum hydroxide/magnesium hydroxide/simethicone Suspension 30 milliLiter(s) Oral every 6 hours PRN  dextrose 5%. 1000 milliLiter(s) IV Continuous <Continuous>  dextrose 5%. 1000 milliLiter(s) IV Continuous <Continuous>  dextrose 50% Injectable 25 Gram(s) IV Push once  dextrose 50% Injectable 12.5 Gram(s) IV Push once  dextrose 50% Injectable 25 Gram(s) IV Push once  dextrose Oral Gel 15 Gram(s) Oral once PRN  diphenhydrAMINE 50 milliGRAM(s) Oral every 6 hours PRN  glucagon  Injectable 1 milliGRAM(s) IntraMuscular once  haloperidol     Tablet 5 milliGRAM(s) Oral every 6 hours PRN  insulin glargine Injectable (LANTUS) 18 Unit(s) SubCutaneous at bedtime  insulin lispro (ADMELOG) corrective regimen sliding scale   SubCutaneous Before meals and at bedtime  insulin lispro Injectable (ADMELOG) 6 Unit(s) SubCutaneous before breakfast  insulin lispro Injectable (ADMELOG) 10 Unit(s) SubCutaneous before dinner  insulin lispro Injectable (ADMELOG) 10 Unit(s) SubCutaneous before lunch  magnesium hydroxide Suspension 30 milliLiter(s) Oral daily PRN  melatonin. 5 milliGRAM(s) Oral at bedtime  ondansetron Injectable 4 milliGRAM(s) IntraMuscular every 6 hours PRN  QUEtiapine 200 milliGRAM(s) Oral at bedtime  QUEtiapine 125 milliGRAM(s) Oral daily    REVIEW OF SYSTEMS  Constitutional:  Negative fever, chills or loss of appetite.  Eyes:  Negative blurry vision or double vision.  Cardiovascular:  Negative for chest pain or palpitations.  Respiratory:  Negative for cough, wheezing, or shortness of breath.   Gastrointestinal:  Negative for nausea, vomiting, diarrhea, constipation, or abdominal pain.  Genitourinary:  Negative frequency, urgency or dysuria.  Neurologic:  No headache, confusion, dizziness, lightheadedness.    PHYSICAL EXAM  Vital Signs Last 24 Hrs  T(C): 37.2 (18 Dec 2023 16:30), Max: 37.2 (18 Dec 2023 16:30)  T(F): 98.9 (18 Dec 2023 16:30), Max: 98.9 (18 Dec 2023 16:30)  HR: 93 (18 Dec 2023 16:30) (93 - 93)  BP: 131/75 (18 Dec 2023 16:30) (131/75 - 131/75)  BP(mean): --  RR: --  SpO2: 96% (18 Dec 2023 16:30) (96% - 96%)    Parameters below as of 18 Dec 2023 16:30  Patient On (Oxygen Delivery Method): room air    Constitutional: Awake, alert, in no acute distress.   HEENT: Normocephalic, atraumatic, MARY, no proptosis or lid retraction.   Neck: supple, no acanthosis, no thyromegaly or palpable thyroid nodules.  Respiratory: Lungs clear to ausculation bilaterally.   Cardiovascular: regular rhythm, normal S1 and S2, no audible murmurs.   GI: soft, non-tender, non-distended, bowel sounds present, no masses appreciated.  Extremities: No lower extremity edema, peripheral pulses present.   Skin: no rashes.   Psychiatric: AAO x 3. Normal affect/mood.     LABS  CBC - WBC/HGB/HTC/PLT: 9.47/11.3/34.1/298 (12-12-23)  BMP: Na/K/Cl/Bicarb/BUN/Cr/Gluc: 139/4.3/103/25/20/0.65/173 (12-12-23)  Anion Gap: 11 (12-12-23)  eGFR: 107 (12-12-23)  Calcium: 9.4 (12-12-23)  Phosphorus: -- (12-12-23)  Magnesium: -- (12-12-23)  LFT - Alb/Tprot/Tbili/Dbili/AlkPhos/ALT/AST: 3.8/--/0.2/--/140/56/34 (12-12-23)    Thyroid Stimulating Hormone, Serum: 2.44 (11-22-23)  Total T4/Free T4: --/1.2 (11-22-23)      HISTORY OF PRESENT ILLNESS  This is a 53-y.o. HF patient, Belarusian-speaking, with reported history of schizophrenia, diabetes is presenting due to possible psychiatric complaints/AMS. Patient was found wandering and is not cooperative, initially admitted for hyperglycemia and psych CL team at transferring facility reported the patient was endorsing suicidal ideation. Pt transferred for [facility] on 2PC due to concerns that patient was a danger to herself.   Patient transferred from [facility] on 11/30. At [facility], she was seen by endocrinology and was transferred on Lantus 18 units HS and admelog 6-10-10 with meals, which was continued at [facility] with glucose mostly at target.  Our endocrine service consulted at discharge for assistance with discharge recommendations, as she has not taken insulin in the past.    Pt approached in the hallway and initially declined conversation and  services. Floor SW assisted with conversation and interpretation. She is a poor historian and often doesn't answer questions asked. Unclear how long she has had diabetes. She reports being on oral medication in the past that she took 3 times/day, but doesn't recall name and hasn't taken in awhile. She does not monitor FSG and declines to do so. Asked if ever hospitalized for hyperglycemia/DKA but she did not answer the question directly. Her diet does seems appropriate with no extra sweets, juices, or sodas. She gets meals at the shelter. She has been eating well here with moderate carbs and minimal snacking inbetween meals overnight. She is RAJI pending and will continue her care at [facility] [place] operated by [facility].    CAPILLARY BLOOD GLUCOSE & INSULIN RECEIVED  223 mg/dL (12-16 @ 11:42) - Lispro 10+2  111 mg/dL (12-16 @ 16:35)  198 mg/dL (12-16 @ 22:40) - Lantus 18 + lispro 1  99 mg/dL (12-17 @ 08:05) - Lispro 6  154 mg/dL (12-17 @ 11:51) - Lispro 10+1  148 mg/dL (12-17 @ 17:02) - Lispro 10  230 mg/dL (12-17 @ 21:41) - Lantus 18 + lispro 2  153 mg/dL (12-18 @ 07:41) - Lispro 6+1  166 mg/dL (12-18 @ 12:05) - Lispro 10+1  139 mg/dL (12-18 @ 17:13) - Lispro 10. Ate salmon, beans, and ice cream.  159 mg/dL (12-18 @ 21:31) - Lantus 18 + lispro 1  103 mg/dL (12-19 @ 07:51) - held for value. Ate eggs and cereal.    ALLERGIES  No Known Allergies    CURRENT MEDICATIONS  acetaminophen     Tablet .. 650 milliGRAM(s) Oral every 6 hours PRN  aluminum hydroxide/magnesium hydroxide/simethicone Suspension 30 milliLiter(s) Oral every 6 hours PRN  dextrose 5%. 1000 milliLiter(s) IV Continuous <Continuous>  dextrose 5%. 1000 milliLiter(s) IV Continuous <Continuous>  dextrose 50% Injectable 25 Gram(s) IV Push once  dextrose 50% Injectable 12.5 Gram(s) IV Push once  dextrose 50% Injectable 25 Gram(s) IV Push once  dextrose Oral Gel 15 Gram(s) Oral once PRN  diphenhydrAMINE 50 milliGRAM(s) Oral every 6 hours PRN  glucagon  Injectable 1 milliGRAM(s) IntraMuscular once  haloperidol     Tablet 5 milliGRAM(s) Oral every 6 hours PRN  insulin glargine Injectable (LANTUS) 18 Unit(s) SubCutaneous at bedtime  insulin lispro (ADMELOG) corrective regimen sliding scale   SubCutaneous Before meals and at bedtime  insulin lispro Injectable (ADMELOG) 6 Unit(s) SubCutaneous before breakfast  insulin lispro Injectable (ADMELOG) 10 Unit(s) SubCutaneous before dinner  insulin lispro Injectable (ADMELOG) 10 Unit(s) SubCutaneous before lunch  magnesium hydroxide Suspension 30 milliLiter(s) Oral daily PRN  melatonin. 5 milliGRAM(s) Oral at bedtime  ondansetron Injectable 4 milliGRAM(s) IntraMuscular every 6 hours PRN  QUEtiapine 200 milliGRAM(s) Oral at bedtime  QUEtiapine 125 milliGRAM(s) Oral daily    REVIEW OF SYSTEMS  Constitutional:  Negative fever, chills or loss of appetite.  Eyes:  Negative blurry vision or double vision.  Cardiovascular:  Negative for chest pain or palpitations.  Respiratory:  Negative for cough, wheezing, or shortness of breath.   Gastrointestinal:  Negative for nausea, vomiting, diarrhea, constipation, or abdominal pain.  Genitourinary:  Negative frequency, urgency or dysuria.  Neurologic:  No headache, confusion, dizziness, lightheadedness.    PHYSICAL EXAM  Vital Signs Last 24 Hrs  T(C): 37.2 (18 Dec 2023 16:30), Max: 37.2 (18 Dec 2023 16:30)  T(F): 98.9 (18 Dec 2023 16:30), Max: 98.9 (18 Dec 2023 16:30)  HR: 93 (18 Dec 2023 16:30) (93 - 93)  BP: 131/75 (18 Dec 2023 16:30) (131/75 - 131/75)  BP(mean): --  RR: --  SpO2: 96% (18 Dec 2023 16:30) (96% - 96%)    Parameters below as of 18 Dec 2023 16:30  Patient On (Oxygen Delivery Method): room air    Constitutional: Awake, alert, in no acute distress.   HEENT: Normocephalic, atraumatic, MARY, no proptosis or lid retraction.   Neck: supple, no acanthosis, no thyromegaly or palpable thyroid nodules.  Respiratory: Lungs clear to ausculation bilaterally.   Cardiovascular: regular rhythm, normal S1 and S2, no audible murmurs.   GI: soft, non-tender, non-distended, bowel sounds present, no masses appreciated.  Extremities: No lower extremity edema, peripheral pulses present.   Skin: no rashes.   Psychiatric: AAO x 3. Normal affect/mood.     LABS  CBC - WBC/HGB/HTC/PLT: 9.47/11.3/34.1/298 (12-12-23)  BMP: Na/K/Cl/Bicarb/BUN/Cr/Gluc: 139/4.3/103/25/20/0.65/173 (12-12-23)  Anion Gap: 11 (12-12-23)  eGFR: 107 (12-12-23)  Calcium: 9.4 (12-12-23)  Phosphorus: -- (12-12-23)  Magnesium: -- (12-12-23)  LFT - Alb/Tprot/Tbili/Dbili/AlkPhos/ALT/AST: 3.8/--/0.2/--/140/56/34 (12-12-23)    Thyroid Stimulating Hormone, Serum: 2.44 (11-22-23)  Total T4/Free T4: --/1.2 (11-22-23)

## 2023-12-19 NOTE — CONSULT NOTE ADULT - PROBLEM SELECTOR RECOMMENDATION 9
Type 2 diabetes mellitus with hyperglycemia  - Please continue lantus *** units at bedtime.   - Continue lispro *** units before each meal.  - Continue lispro moderate dose sliding scale before meals and at bedtime.  - Patient's fingerstick glucose goal is 100-180 mg/dL.    - For discharge, patient can ***.    - Patient can follow up at discharge with [facility] by calling [phone] to make an appointment.      Case discussed with Dr. Araya. Primary team updated. Type 2 diabetes mellitus with hyperglycemia  - Please continue lantus *** units at bedtime.   - Continue lispro *** units before each meal.  - Continue lispro moderate dose sliding scale before meals and at bedtime.  - Patient's fingerstick glucose goal is 100-180 mg/dL.    - For discharge, patient can ***.    - Patient can follow up at discharge with [facility] by calling [phone] to make an appointment.      Case discussed with Dr. Araya. Primary team updated. Type 2 diabetes mellitus with hyperglycemia  - For discharge: Metformin ER 500mg 1 tablet with breakfast and dinner + glimepiride 2mg daily before breakfast.  - She declines FSG monitoring at home.  - Patient can follow up at discharge at [facility] pending.     Case discussed with Dr. Araya. Primary team updated.

## 2023-12-19 NOTE — CHART NOTE - NSCHARTNOTEFT_GEN_A_CORE
Med Consult Note    Went to evaluate patient on [employer], however pt stated she does not wish to speak to us and would not like to be evaluated today.    primary team consulted Endocrine, who recommend  Metformin ER 500mg 1 tablet with breakfast and dinner + glimepiride 2mg daily before breakfast. Pt does not wish to perform FSG monitoring at home.  Pt can continue to follow at [facility] [place], operated by [facility] for continued management of diabetes.     Dispo planning per primary team.    No medicine contraindications to discharge. Case discussed with Dr. Fernández. Med Consult Note    Went to evaluate patient on [employer], however pt stated she does not wish to speak to us and would not like to be evaluated today.    primary team consulted Endocrine, who recommend  Metformin ER 500mg 1 tablet with breakfast and dinner + glimepiride 2mg daily before breakfast. Pt does not wish to perform FSG monitoring at home.  Pt can continue to follow at [facility] for continued management of diabetes.     Dispo planning per primary team.    No medicine contraindications to discharge. Case discussed with Dr. Fernández. Med Consult Note    Went to evaluate patient on [employer], however pt stated she does not wish to speak to us and would not like to be evaluated today.    primary team consulted Endocrine, who recommend  Metformin ER 500mg 1 tablet with breakfast and dinner + glimepiride 2mg daily before breakfast. Pt does not wish to perform FSG monitoring at home.  Will not send pt home on insulin   Pt can continue to follow at [facility] for continued management of diabetes.     Dispo planning per primary team.    No medicine contraindications to discharge. Case discussed with Dr. Fernández.    Pt seen with Dr. Anna, agreed with above, and Discharge DM regimen per Endocrine team Med Consult Note    Went to evaluate patient on [employer], however pt stated she does not wish to speak to us and would not like to be evaluated today.    primary team consulted Endocrine, who recommend  Metformin ER 500mg 1 tablet with breakfast and dinner + glimepiride 2mg daily before breakfast. Pt does not wish to perform FSG monitoring at home.  Will not send pt home on insulin   Pt can continue to follow at [facility] for continued management of diabetes.     Dispo planning per primary team.    No medicine contraindications to discharge. Case discussed with Dr. Fernández.    Pt seen with Dr. Anna, agreed with above, and Discharge DM regimen per Endocrine team

## 2023-12-19 NOTE — CONSULT NOTE ADULT - ASSESSMENT
This is a 53-y.o. HF patient, Ukrainian-speaking, with reported history of schizophrenia, type 2 diabetes is presenting due to possible psychiatric complaints/AMS, now stable for discharge.    Endocrine consulted for discharge recommendations for uncontrolled type 2 diabetes.    A1C: 13.4 %  BUN: 20  Creatinine: 0.65  GFR: 107  Weight: 45.4  BMI: 19.5 This is a 53-y.o. HF patient, Malay-speaking, with reported history of schizophrenia, type 2 diabetes is presenting due to possible psychiatric complaints/AMS, now stable for discharge.    Endocrine consulted for discharge recommendations for uncontrolled type 2 diabetes.    A1C: 13.4 %  BUN: 20  Creatinine: 0.65  GFR: 107  Weight: 45.4  BMI: 19.5

## 2023-12-20 NOTE — BH SOCIAL WORK CONFIRMATION FOLLOW UP NOTE - NSCOMMENTS_PSY_ALL_CORE
Caring call 12/20/23: Not required however reached out to sister aT [phone]. Pt met with her today appears to be doing well. Writer also spoke to shelter CM and they confirmed that she arrive at the shelter yesterday.     Linkage 12/20/23: [facility] was due to meet with her today at the shelter.They were unable to reach her as she went out for the day before they met with her. They will attempt again.     [facility]  20-Dec-2023 10:30  [facility] (at [address])  [place] [address]  [phone]   Caring call 12/20/23: Not required however reached out to sister aT [phone]. Pt met with her today appears to be doing well. Writer also spoke to shelter CM and they confirmed that she arrive at the shelter yesterday.     Linkage 12/20/23: [facility] was due to meet with her today at the shelter.They were unable to reach her as she went out for the day before they met with her. They will attempt again.     [facility]  20-Dec-2023 10:30  [facility] (at [address])  [place] [address]  [phone]

## 2023-12-21 DIAGNOSIS — R45.851 SUICIDAL IDEATIONS: ICD-10-CM

## 2023-12-21 DIAGNOSIS — F20.9 SCHIZOPHRENIA, UNSPECIFIED: ICD-10-CM

## 2023-12-21 DIAGNOSIS — Z91.148 PATIENT'S OTHER NONCOMPLIANCE WITH MEDICATION REGIMEN FOR OTHER REASON: ICD-10-CM

## 2023-12-21 DIAGNOSIS — E11.65 TYPE 2 DIABETES MELLITUS WITH HYPERGLYCEMIA: ICD-10-CM

## 2024-01-03 NOTE — BH PATIENT PROFILE - HAS THE PATIENT EXPERIENCED ANY OF THE FOLLOWING WITHIN THE WEEK PRIOR TO ADMISSION?
Venipuncture obtained from right arm. Patient tolerated the procedure without complications or complaints.     
no

## 2024-02-18 NOTE — BH INPATIENT PSYCHIATRY PROGRESS NOTE - NSBHMSEGAIT_PSY_A_CORE
Normal gait / station
no

## 2024-04-23 NOTE — BH PATIENT PROFILE - NSELOPEMENTRISKCURRENT_PSY_ALL_CORE
You can access the FollowMyHealth Patient Portal offered by [facility] by registering at the following website: [email or web address]. By joining [facility] FollowMyHealth portal, you will also be able to view your health information using other applications (apps) compatible with our system. No

## 2025-01-19 NOTE — DIETITIAN INITIAL EVALUATION ADULT - PROBLEM SELECTOR PROBLEM 4
-d/c steroids, continue albuterol  -encouraged no smoking as patient with childhood hx of asthma.   -can be discharged on albuterol inhaler Schizophrenia